# Patient Record
Sex: MALE | Race: WHITE | Employment: OTHER | URBAN - METROPOLITAN AREA
[De-identification: names, ages, dates, MRNs, and addresses within clinical notes are randomized per-mention and may not be internally consistent; named-entity substitution may affect disease eponyms.]

---

## 2018-04-25 PROBLEM — F42.8: Status: ACTIVE | Noted: 2018-04-25

## 2018-04-25 PROBLEM — F32.A CHRONIC DEPRESSION: Status: ACTIVE | Noted: 2018-04-25

## 2018-10-23 PROBLEM — I10 ESSENTIAL HYPERTENSION: Status: ACTIVE | Noted: 2018-10-23

## 2018-11-06 ENCOUNTER — HOSPITAL ENCOUNTER (OUTPATIENT)
Age: 83
Discharge: HOME | End: 2018-11-08
Payer: MEDICARE

## 2018-11-06 ENCOUNTER — NURSE ONLY (OUTPATIENT)
Dept: FAMILY MEDICINE CLINIC | Age: 83
End: 2018-11-06

## 2018-11-06 DIAGNOSIS — K52.9 COLITIS: ICD-10-CM

## 2018-11-06 DIAGNOSIS — I10 ESSENTIAL HYPERTENSION: ICD-10-CM

## 2018-11-06 DIAGNOSIS — I25.10 CORONARY ARTERY DISEASE INVOLVING NATIVE HEART WITHOUT ANGINA PECTORIS, UNSPECIFIED VESSEL OR LESION TYPE: ICD-10-CM

## 2018-11-06 LAB
ANION GAP SERPL CALCULATED.3IONS-SCNC: 15 MMOL/L (ref 7–16)
BUN BLDV-MCNC: 15 MG/DL (ref 8–23)
CALCIUM SERPL-MCNC: 9.1 MG/DL (ref 8.6–10.2)
CHLORIDE BLD-SCNC: 106 MMOL/L (ref 98–107)
CHOLESTEROL, TOTAL: 135 MG/DL (ref 0–199)
CO2: 22 MMOL/L (ref 22–29)
CREAT SERPL-MCNC: 1.1 MG/DL (ref 0.7–1.2)
GFR AFRICAN AMERICAN: >60
GFR NON-AFRICAN AMERICAN: >60 ML/MIN/1.73
GLUCOSE BLD-MCNC: 91 MG/DL (ref 74–99)
HCT VFR BLD CALC: 39.9 % (ref 37–54)
HDLC SERPL-MCNC: 43 MG/DL
HEMOGLOBIN: 12.5 G/DL (ref 12.5–16.5)
LDL CHOLESTEROL CALCULATED: 80 MG/DL (ref 0–99)
MCH RBC QN AUTO: 27.8 PG (ref 26–35)
MCHC RBC AUTO-ENTMCNC: 31.3 % (ref 32–34.5)
MCV RBC AUTO: 88.9 FL (ref 80–99.9)
PDW BLD-RTO: 16.5 FL (ref 11.5–15)
PLATELET # BLD: 233 E9/L (ref 130–450)
PMV BLD AUTO: 11.3 FL (ref 7–12)
POTASSIUM SERPL-SCNC: 4.3 MMOL/L (ref 3.5–5)
RBC # BLD: 4.49 E12/L (ref 3.8–5.8)
SODIUM BLD-SCNC: 143 MMOL/L (ref 132–146)
TRIGL SERPL-MCNC: 60 MG/DL (ref 0–149)
VLDLC SERPL CALC-MCNC: 12 MG/DL
WBC # BLD: 8.9 E9/L (ref 4.5–11.5)

## 2018-11-06 PROCEDURE — 80048 BASIC METABOLIC PNL TOTAL CA: CPT

## 2018-11-06 PROCEDURE — 80061 LIPID PANEL: CPT

## 2018-11-06 PROCEDURE — 85027 COMPLETE CBC AUTOMATED: CPT

## 2019-05-16 ENCOUNTER — APPOINTMENT (OUTPATIENT)
Dept: CT IMAGING | Age: 84
End: 2019-05-16
Payer: MEDICARE

## 2019-05-16 ENCOUNTER — HOSPITAL ENCOUNTER (EMERGENCY)
Age: 84
Discharge: HOME | End: 2019-05-16
Attending: EMERGENCY MEDICINE
Payer: MEDICARE

## 2019-05-16 ENCOUNTER — APPOINTMENT (OUTPATIENT)
Dept: GENERAL RADIOLOGY | Age: 84
End: 2019-05-16
Payer: MEDICARE

## 2019-05-16 VITALS
HEART RATE: 84 BPM | BODY MASS INDEX: 28.88 KG/M2 | SYSTOLIC BLOOD PRESSURE: 112 MMHG | DIASTOLIC BLOOD PRESSURE: 76 MMHG | RESPIRATION RATE: 16 BRPM | TEMPERATURE: 99 F | OXYGEN SATURATION: 96 % | HEIGHT: 67 IN | WEIGHT: 184 LBS

## 2019-05-16 DIAGNOSIS — R07.81 RIB PAIN ON LEFT SIDE: ICD-10-CM

## 2019-05-16 DIAGNOSIS — R07.89 CHEST WALL PAIN: ICD-10-CM

## 2019-05-16 DIAGNOSIS — R10.32 LEFT LOWER QUADRANT PAIN: Primary | ICD-10-CM

## 2019-05-16 LAB
ALBUMIN SERPL-MCNC: 4.1 G/DL (ref 3.5–5.2)
ALP BLD-CCNC: 94 U/L (ref 40–129)
ALT SERPL-CCNC: 19 U/L (ref 0–40)
ANION GAP SERPL CALCULATED.3IONS-SCNC: 12 MMOL/L (ref 7–16)
APTT: 26.4 SEC (ref 24.5–35.1)
AST SERPL-CCNC: 22 U/L (ref 0–39)
BASOPHILS ABSOLUTE: 0.06 E9/L (ref 0–0.2)
BASOPHILS RELATIVE PERCENT: 0.6 % (ref 0–2)
BILIRUB SERPL-MCNC: 0.9 MG/DL (ref 0–1.2)
BILIRUBIN URINE: NEGATIVE
BLOOD, URINE: NEGATIVE
BUN BLDV-MCNC: 17 MG/DL (ref 8–23)
BURR CELLS: ABNORMAL
CALCIUM SERPL-MCNC: 9.5 MG/DL (ref 8.6–10.2)
CHLORIDE BLD-SCNC: 101 MMOL/L (ref 98–107)
CLARITY: CLEAR
CO2: 23 MMOL/L (ref 22–29)
COLOR: YELLOW
CREAT SERPL-MCNC: 1.2 MG/DL (ref 0.7–1.2)
EKG ATRIAL RATE: 81 BPM
EKG P AXIS: 45 DEGREES
EKG P-R INTERVAL: 198 MS
EKG Q-T INTERVAL: 376 MS
EKG QRS DURATION: 96 MS
EKG QTC CALCULATION (BAZETT): 436 MS
EKG R AXIS: -30 DEGREES
EKG T AXIS: 39 DEGREES
EKG VENTRICULAR RATE: 81 BPM
EOSINOPHILS ABSOLUTE: 0.24 E9/L (ref 0.05–0.5)
EOSINOPHILS RELATIVE PERCENT: 2.3 % (ref 0–6)
GFR AFRICAN AMERICAN: >60
GFR AFRICAN AMERICAN: >60
GFR NON-AFRICAN AMERICAN: 58 ML/MIN/1.73
GFR NON-AFRICAN AMERICAN: 58 ML/MIN/1.73
GLUCOSE BLD-MCNC: 90 MG/DL (ref 74–99)
GLUCOSE BLD-MCNC: 92 MG/DL (ref 74–99)
GLUCOSE URINE: NEGATIVE MG/DL
HCT VFR BLD CALC: 42.9 % (ref 37–54)
HEMOGLOBIN: 14.3 G/DL (ref 12.5–16.5)
IMMATURE GRANULOCYTES #: 0.03 E9/L
IMMATURE GRANULOCYTES %: 0.3 % (ref 0–5)
INR BLD: 1.1
KETONES, URINE: NEGATIVE MG/DL
LEUKOCYTE ESTERASE, URINE: NEGATIVE
LYMPHOCYTES ABSOLUTE: 1.85 E9/L (ref 1.5–4)
LYMPHOCYTES RELATIVE PERCENT: 17.7 % (ref 20–42)
MCH RBC QN AUTO: 30.2 PG (ref 26–35)
MCHC RBC AUTO-ENTMCNC: 33.3 % (ref 32–34.5)
MCV RBC AUTO: 90.5 FL (ref 80–99.9)
MONOCYTES ABSOLUTE: 1.54 E9/L (ref 0.1–0.95)
MONOCYTES RELATIVE PERCENT: 14.8 % (ref 2–12)
NEUTROPHILS ABSOLUTE: 6.71 E9/L (ref 1.8–7.3)
NEUTROPHILS RELATIVE PERCENT: 64.3 % (ref 43–80)
NITRITE, URINE: NEGATIVE
OVALOCYTES: ABNORMAL
PDW BLD-RTO: 13.8 FL (ref 11.5–15)
PERFORMED ON: ABNORMAL
PH UA: 5.5 (ref 5–9)
PLATELET # BLD: 221 E9/L (ref 130–450)
PMV BLD AUTO: 10.6 FL (ref 7–12)
POC CHLORIDE: 111 MMOL/L (ref 100–108)
POC CREATININE: 1.2 MG/DL (ref 0.7–1.2)
POC POTASSIUM: 3.7 MMOL/L (ref 3.5–5)
POC SODIUM: 139 MMOL/L (ref 132–146)
POIKILOCYTES: ABNORMAL
POTASSIUM SERPL-SCNC: 4 MMOL/L (ref 3.5–5)
PROTEIN UA: NEGATIVE MG/DL
PROTHROMBIN TIME: 13.1 SEC (ref 9.3–12.4)
RBC # BLD: 4.74 E12/L (ref 3.8–5.8)
SODIUM BLD-SCNC: 136 MMOL/L (ref 132–146)
SPECIFIC GRAVITY UA: <=1.005 (ref 1–1.03)
TOTAL PROTEIN: 7.6 G/DL (ref 6.4–8.3)
TROPONIN: <0.01 NG/ML (ref 0–0.03)
UROBILINOGEN, URINE: 0.2 E.U./DL
WBC # BLD: 10.9 E9/L (ref 4.5–11.5)

## 2019-05-16 PROCEDURE — 85025 COMPLETE CBC W/AUTO DIFF WBC: CPT

## 2019-05-16 PROCEDURE — 82947 ASSAY GLUCOSE BLOOD QUANT: CPT

## 2019-05-16 PROCEDURE — 84295 ASSAY OF SERUM SODIUM: CPT

## 2019-05-16 PROCEDURE — 36415 COLL VENOUS BLD VENIPUNCTURE: CPT

## 2019-05-16 PROCEDURE — 84484 ASSAY OF TROPONIN QUANT: CPT

## 2019-05-16 PROCEDURE — 99284 EMERGENCY DEPT VISIT MOD MDM: CPT

## 2019-05-16 PROCEDURE — 2580000003 HC RX 258: Performed by: RADIOLOGY

## 2019-05-16 PROCEDURE — 93010 ELECTROCARDIOGRAM REPORT: CPT | Performed by: INTERNAL MEDICINE

## 2019-05-16 PROCEDURE — 80053 COMPREHEN METABOLIC PANEL: CPT

## 2019-05-16 PROCEDURE — 93005 ELECTROCARDIOGRAM TRACING: CPT | Performed by: NURSE PRACTITIONER

## 2019-05-16 PROCEDURE — 81003 URINALYSIS AUTO W/O SCOPE: CPT

## 2019-05-16 PROCEDURE — 74177 CT ABD & PELVIS W/CONTRAST: CPT

## 2019-05-16 PROCEDURE — 71046 X-RAY EXAM CHEST 2 VIEWS: CPT

## 2019-05-16 PROCEDURE — 82435 ASSAY OF BLOOD CHLORIDE: CPT

## 2019-05-16 PROCEDURE — 85730 THROMBOPLASTIN TIME PARTIAL: CPT

## 2019-05-16 PROCEDURE — 82565 ASSAY OF CREATININE: CPT

## 2019-05-16 PROCEDURE — 85610 PROTHROMBIN TIME: CPT

## 2019-05-16 PROCEDURE — 84132 ASSAY OF SERUM POTASSIUM: CPT

## 2019-05-16 PROCEDURE — 6360000004 HC RX CONTRAST MEDICATION: Performed by: RADIOLOGY

## 2019-05-16 RX ORDER — IBUPROFEN 600 MG/1
600 TABLET ORAL 4 TIMES DAILY PRN
Qty: 30 TABLET | Refills: 0 | Status: SHIPPED | OUTPATIENT
Start: 2019-05-16 | End: 2019-06-18 | Stop reason: ALTCHOICE

## 2019-05-16 RX ORDER — SODIUM CHLORIDE 0.9 % (FLUSH) 0.9 %
10 SYRINGE (ML) INJECTION PRN
Status: DISCONTINUED | OUTPATIENT
Start: 2019-05-16 | End: 2019-05-16 | Stop reason: HOSPADM

## 2019-05-16 RX ADMIN — IOPAMIDOL 110 ML: 755 INJECTION, SOLUTION INTRAVENOUS at 16:33

## 2019-05-16 RX ADMIN — Medication 10 ML: at 16:33

## 2019-05-16 ASSESSMENT — PAIN DESCRIPTION - LOCATION: LOCATION: BACK

## 2019-05-16 ASSESSMENT — ENCOUNTER SYMPTOMS: ABDOMINAL PAIN: 0

## 2019-05-16 ASSESSMENT — PAIN SCALES - GENERAL: PAINLEVEL_OUTOF10: 9

## 2019-05-16 NOTE — ED PROVIDER NOTES
Patient is a 80year old male presenting to the ED with chief complaint of left rib pain over the last few days. Pt states he went to see his chiropractor today for the pain. Per pt, chiropractor pushed on his left flank region causing significant pain. Advised to come to the ED for further evaluation and management. Pt has no abdominal pain at this time. Pt denies any fall, injury, headache, chest pain, sob, nausea, emesis, diarrhea, urinary sx, or any further complaints at this time. The history is provided by the patient. No  was used. Chest Pain   Chest pain location: Left lower rib cage. Pain severity:  Moderate  Context: breathing and movement    Context: not trauma    Associated symptoms: no abdominal pain        Review of Systems   Cardiovascular: Negative for chest pain. Gastrointestinal: Negative for abdominal pain. Musculoskeletal:        Left rib pain   All other systems reviewed and are negative. Physical Exam   Constitutional: He is oriented to person, place, and time. He appears well-developed and well-nourished. He appears distressed (mild). HENT:   Head: Normocephalic and atraumatic. Eyes: Pupils are equal, round, and reactive to light. Conjunctivae and EOM are normal.   Neck: Normal range of motion. Neck supple. Cardiovascular: Normal rate and regular rhythm. Pulmonary/Chest: Effort normal and breath sounds normal.   Abdominal: Soft. There is no tenderness. Musculoskeletal: Normal range of motion. Moderate left lower rib cage tenderness   Neurological: He is alert and oriented to person, place, and time. Skin: Skin is warm and dry. Psychiatric: He has a normal mood and affect. Nursing note and vitals reviewed.       Procedures    MDM  Number of Diagnoses or Management Options  Chest wall pain: new and requires workup  Left lower quadrant pain: new and requires workup  Rib pain on left side: new and requires workup  Diagnosis management comments: Differential diagnoses include chest wall pain, re-pain, rib fracture, rib strain, aneurysm, pneumothorax, hemothorax, appendicitis, pancreatitis, diverticulitis. Amount and/or Complexity of Data Reviewed  Clinical lab tests: reviewed and ordered  Tests in the radiology section of CPT®: ordered and reviewed  Tests in the medicine section of CPT®: ordered and reviewed  Independent visualization of images, tracings, or specimens: yes (EKG was done at 2:26 PM.  Normal sinus rhythm. Rate of 81. Positive LVH. No acute ST-T elevation. No old EKG available for comparison. Deviation. Incomplete right bundle branch block. No leakage available for comparison)    Risk of Complications, Morbidity, and/or Mortality  Presenting problems: high  Diagnostic procedures: high  Management options: high  General comments: Patient was stable throughout the course of treatment. Labs were unremarkable. EKG did not show any acute ST-T changes. CAT scan of abdomen and pelvis was negative for abdominal aortic aneurysm. It was positive for atherosclerosis of aorta. There is discussed with patient's. Will discharge patient home on pain medicine. He was advised to follow with her PCP for evaluation. He was also advised to return to ED if he is worsening in anyway. Patient understood and agreed with our management.           --------------------------------------------- PAST HISTORY ---------------------------------------------  Past Medical History:  has a past medical history of Acute pancreatitis, Anxiety, BPH (benign prostatic hyperplasia), CAD (coronary artery disease), GERD (gastroesophageal reflux disease), Hyperlipidemia, Obsessive compulsive disorder, and Psoriasis. Past Surgical History:  has a past surgical history that includes Colonoscopy (03/14/2017); Upper gastrointestinal endoscopy (03/14/2017); Throat surgery; and Cataract removal (Bilateral, 08/24/2017).     Social History:  reports that he quit smoking about 49 years ago. His smoking use included cigarettes. He has never used smokeless tobacco. He reports that he drinks alcohol. He reports that he does not use drugs. Family History: family history includes Asthma in his mother; Coronary Art Dis in his mother; Parkinsonism in his father. The patients home medications have been reviewed. Allergies: Patient has no known allergies.     -------------------------------------------------- RESULTS -------------------------------------------------  Labs:  Results for orders placed or performed during the hospital encounter of 05/16/19   Troponin   Result Value Ref Range    Troponin <0.01 0.00 - 0.03 ng/mL   CBC Auto Differential   Result Value Ref Range    WBC 10.9 4.5 - 11.5 E9/L    RBC 4.74 3.80 - 5.80 E12/L    Hemoglobin 14.3 12.5 - 16.5 g/dL    Hematocrit 42.9 37.0 - 54.0 %    MCV 90.5 80.0 - 99.9 fL    MCH 30.2 26.0 - 35.0 pg    MCHC 33.3 32.0 - 34.5 %    RDW 13.8 11.5 - 15.0 fL    Platelets 226 137 - 397 E9/L    MPV 10.6 7.0 - 12.0 fL    Neutrophils % 64.3 43.0 - 80.0 %    Immature Granulocytes % 0.3 0.0 - 5.0 %    Lymphocytes % 17.7 (L) 20.0 - 42.0 %    Monocytes % 14.8 (H) 2.0 - 12.0 %    Eosinophils % 2.3 0.0 - 6.0 %    Basophils % 0.6 0.0 - 2.0 %    Neutrophils # 6.71 1.80 - 7.30 E9/L    Immature Granulocytes # 0.03 E9/L    Lymphocytes # 1.85 1.50 - 4.00 E9/L    Monocytes # 1.54 (H) 0.10 - 0.95 E9/L    Eosinophils # 0.24 0.05 - 0.50 E9/L    Basophils # 0.06 0.00 - 0.20 E9/L    Poikilocytes 1+     [place] Cells 1+     Ovalocytes 1+    Comprehensive Metabolic Panel   Result Value Ref Range    Sodium 136 132 - 146 mmol/L    Potassium 4.0 3.5 - 5.0 mmol/L    Chloride 101 98 - 107 mmol/L    CO2 23 22 - 29 mmol/L    Anion Gap 12 7 - 16 mmol/L    Glucose 90 74 - 99 mg/dL    BUN 17 8 - 23 mg/dL    CREATININE 1.2 0.7 - 1.2 mg/dL    GFR Non-African American 58 >=60 mL/min/1.73    GFR African American >60     Calcium 9.5 8.6 - 10.2 mg/dL    Total Protein 7.6 6.4 - 8.3 g/dL    Alb 4.1 3.5 - 5.2 g/dL    Total Bilirubin 0.9 0.0 - 1.2 mg/dL    Alkaline Phosphatase 94 40 - 129 U/L    ALT 19 0 - 40 U/L    AST 22 0 - 39 U/L   Protime-INR   Result Value Ref Range    Protime 13.1 (H) 9.3 - 12.4 sec    INR 1.1    APTT   Result Value Ref Range    aPTT 26.4 24.5 - 35.1 sec   POCT Venous   Result Value Ref Range    POC Sodium 139 132 - 146 mmol/L    POC Potassium 3.7 3.5 - 5.0 mmol/L    POC Chloride 111 (H) 100 - 108 mmol/L    POC Glucose 92 74 - 99 mg/dl    POC Creatinine 1.2 0.7 - 1.2 mg/dL    GFR Non-African American 58 >=60 mL/min/1.73    GFR  >60     Performed on SEE BELOW    EKG 12 Lead   Result Value Ref Range    Ventricular Rate 81 BPM    Atrial Rate 81 BPM    P-R Interval 198 ms    QRS Duration 96 ms    Q-T Interval 376 ms    QTc Calculation (Bazett) 436 ms    P Axis 45 degrees    R Axis -30 degrees    T Axis 39 degrees       Radiology:  CT ABDOMEN PELVIS W IV CONTRAST Additional Contrast? None   Final Result      NO ACUTE PATHOLOGY SEEN IN ABDOMEN OR PELVIS       Moderate-sized hiatal hernia      Grade 2 anterolisthesis of L5-S1 with osteoarthritic changes and disc   disease. Severe atherosclerotic changes of the abdominal aorta iliac vessels   with calcified plaque. XR CHEST STANDARD (2 VW)   Final Result   NO ACUTE CARDIOPULMONARY PROCESS              ------------------------- NURSING NOTES AND VITALS REVIEWED ---------------------------  Date / Time Roomed:  5/16/2019  2:38 PM  ED Bed Assignment:  40/40    The nursing notes within the ED encounter and vital signs as below have been reviewed.    /81   Pulse 91   Temp 99 °F (37.2 °C)   Resp 16   Ht 5' 7\" (1.702 m)   Wt 184 lb (83.5 kg)   SpO2 96%   BMI 28.82 kg/m²   Oxygen Saturation Interpretation: Normal      ------------------------------------------ PROGRESS NOTES ------------------------------------------  I have spoken with the patient and discussed todays results, in addition to providing specific details for the plan of care and counseling regarding the diagnosis and prognosis. Their questions are answered at this time and they are agreeable with the plan. I discussed at length with them reasons for immediate return here for re evaluation. They will followup with primary care by calling their office tomorrow. --------------------------------- ADDITIONAL PROVIDER NOTES ---------------------------------  At this time the patient is without objective evidence of an acute process requiring hospitalization or inpatient management. They have remained hemodynamically stable throughout their entire ED visit and are stable for discharge with outpatient follow-up. The plan has been discussed in detail and they are aware of the specific conditions for emergent return, as well as the importance of follow-up. New Prescriptions    IBUPROFEN (ADVIL;MOTRIN) 600 MG TABLET    Take 1 tablet by mouth 4 times daily as needed for Pain       Diagnosis:  1. Left lower quadrant pain    2. Chest wall pain    3. Rib pain on left side        Disposition:  Patient's disposition: Discharge to home  Patient's condition is stable. Labs      Radiology      EKG Interpretation. EKG was done at 2:26 PM.  Normal sinus rhythm. Rate of 81. Positive LVH. No acute ST-T elevation. No old EKG available for comparison. Deviation. Incomplete right bundle branch block. No leakage available for comparison    5/16/19, 3:21 PM.    This note is prepared by Bobby Charles, acting as Scribe for Miko Hilton DO. Miko Hilton DO:  The scribe's documentation has been prepared under my direction and personally reviewed by me in its entirety. I confirm that the note above accurately reflects all work, treatment, procedures, and medical decision making performed by me.         Miko Hilton DO  05/16/19 1611

## 2021-02-02 PROBLEM — K58.0 IRRITABLE BOWEL SYNDROME WITH DIARRHEA: Status: ACTIVE | Noted: 2021-02-02

## 2021-02-02 PROBLEM — F43.21 GRIEF REACTION: Status: ACTIVE | Noted: 2021-02-02

## 2021-02-02 PROBLEM — K21.9 HIATAL HERNIA WITH GERD: Status: ACTIVE | Noted: 2021-02-02

## 2021-02-02 PROBLEM — F43.20 GRIEF REACTION: Status: ACTIVE | Noted: 2021-02-02

## 2021-02-02 PROBLEM — K44.9 HIATAL HERNIA WITH GERD: Status: ACTIVE | Noted: 2021-02-02

## 2021-03-12 ENCOUNTER — APPOINTMENT (OUTPATIENT)
Dept: CT IMAGING | Age: 86
End: 2021-03-12
Payer: MEDICARE

## 2021-03-12 ENCOUNTER — APPOINTMENT (OUTPATIENT)
Dept: ULTRASOUND IMAGING | Age: 86
End: 2021-03-12
Payer: MEDICARE

## 2021-03-12 ENCOUNTER — HOSPITAL ENCOUNTER (EMERGENCY)
Age: 86
Discharge: HOME | End: 2021-03-12
Attending: EMERGENCY MEDICINE
Payer: MEDICARE

## 2021-03-12 ENCOUNTER — APPOINTMENT (OUTPATIENT)
Dept: GENERAL RADIOLOGY | Age: 86
End: 2021-03-12
Payer: MEDICARE

## 2021-03-12 VITALS
HEART RATE: 83 BPM | HEIGHT: 68 IN | BODY MASS INDEX: 21.67 KG/M2 | DIASTOLIC BLOOD PRESSURE: 82 MMHG | SYSTOLIC BLOOD PRESSURE: 131 MMHG | RESPIRATION RATE: 17 BRPM | TEMPERATURE: 97.5 F | WEIGHT: 143 LBS | OXYGEN SATURATION: 97 %

## 2021-03-12 DIAGNOSIS — J44.1 COPD EXACERBATION (HCC): ICD-10-CM

## 2021-03-12 DIAGNOSIS — R06.00 DYSPNEA, UNSPECIFIED TYPE: Primary | ICD-10-CM

## 2021-03-12 DIAGNOSIS — K44.9 HIATAL HERNIA: ICD-10-CM

## 2021-03-12 LAB
ALBUMIN SERPL-MCNC: 3.7 G/DL (ref 3.5–5.2)
ALP BLD-CCNC: 77 U/L (ref 40–129)
ALT SERPL-CCNC: 15 U/L (ref 0–40)
ANION GAP SERPL CALCULATED.3IONS-SCNC: 11 MMOL/L (ref 7–16)
AST SERPL-CCNC: 23 U/L (ref 0–39)
BILIRUB SERPL-MCNC: 0.7 MG/DL (ref 0–1.2)
BUN BLDV-MCNC: 13 MG/DL (ref 8–23)
CALCIUM SERPL-MCNC: 9.3 MG/DL (ref 8.6–10.2)
CHLORIDE BLD-SCNC: 103 MMOL/L (ref 98–107)
CO2: 25 MMOL/L (ref 22–29)
CREAT SERPL-MCNC: 1 MG/DL (ref 0.7–1.2)
GFR AFRICAN AMERICAN: >60
GFR NON-AFRICAN AMERICAN: >60 ML/MIN/1.73
GLUCOSE BLD-MCNC: 91 MG/DL (ref 74–99)
HCT VFR BLD CALC: 40.8 % (ref 37–54)
HEMOGLOBIN: 13.2 G/DL (ref 12.5–16.5)
MCH RBC QN AUTO: 29.6 PG (ref 26–35)
MCHC RBC AUTO-ENTMCNC: 32.4 % (ref 32–34.5)
MCV RBC AUTO: 91.5 FL (ref 80–99.9)
PDW BLD-RTO: 14.6 FL (ref 11.5–15)
PLATELET # BLD: 211 E9/L (ref 130–450)
PMV BLD AUTO: 10.4 FL (ref 7–12)
POTASSIUM SERPL-SCNC: 4.3 MMOL/L (ref 3.5–5)
PRO-BNP: 279 PG/ML (ref 0–450)
RBC # BLD: 4.46 E12/L (ref 3.8–5.8)
SARS-COV-2, NAAT: NOT DETECTED
SODIUM BLD-SCNC: 139 MMOL/L (ref 132–146)
TOTAL PROTEIN: 7.1 G/DL (ref 6.4–8.3)
TROPONIN: <0.01 NG/ML (ref 0–0.03)
WBC # BLD: 9.5 E9/L (ref 4.5–11.5)

## 2021-03-12 PROCEDURE — 99284 EMERGENCY DEPT VISIT MOD MDM: CPT

## 2021-03-12 PROCEDURE — 80053 COMPREHEN METABOLIC PANEL: CPT

## 2021-03-12 PROCEDURE — 87635 SARS-COV-2 COVID-19 AMP PRB: CPT

## 2021-03-12 PROCEDURE — 85027 COMPLETE CBC AUTOMATED: CPT

## 2021-03-12 PROCEDURE — 71275 CT ANGIOGRAPHY CHEST: CPT

## 2021-03-12 PROCEDURE — 6360000004 HC RX CONTRAST MEDICATION: Performed by: RADIOLOGY

## 2021-03-12 PROCEDURE — 84484 ASSAY OF TROPONIN QUANT: CPT

## 2021-03-12 PROCEDURE — 93971 EXTREMITY STUDY: CPT | Performed by: RADIOLOGY

## 2021-03-12 PROCEDURE — 93005 ELECTROCARDIOGRAM TRACING: CPT | Performed by: NURSE PRACTITIONER

## 2021-03-12 PROCEDURE — 83880 ASSAY OF NATRIURETIC PEPTIDE: CPT

## 2021-03-12 PROCEDURE — 93971 EXTREMITY STUDY: CPT

## 2021-03-12 PROCEDURE — 71045 X-RAY EXAM CHEST 1 VIEW: CPT

## 2021-03-12 RX ADMIN — IOPAMIDOL 75 ML: 755 INJECTION, SOLUTION INTRAVENOUS at 21:13

## 2021-03-12 ASSESSMENT — ENCOUNTER SYMPTOMS
EYE PAIN: 0
WHEEZING: 0
RHINORRHEA: 0
ABDOMINAL PAIN: 0
NAUSEA: 0
COUGH: 0
DIARRHEA: 0
SHORTNESS OF BREATH: 1
VOMITING: 0

## 2021-03-12 ASSESSMENT — PAIN DESCRIPTION - PAIN TYPE: TYPE: ACUTE PAIN

## 2021-03-12 ASSESSMENT — PAIN DESCRIPTION - LOCATION: LOCATION: FOOT

## 2021-03-12 NOTE — ED PROVIDER NOTES
Pt Name: Elizabet Ying  MRN: [id number]  [place] 1935  Date of evaluation: 3/12/2021      CHIEF COMPLAINT       Chief Complaint   Patient presents with    Shortness of Breath     ongoing for ten days. Per pt feels SOB when standing up to do dishes and with exertion.  Foot Swelling        Elizabet Ying is a 80 y.o. male presenting to the ED with chief complaint of shortness of breath, foot swelling. Patient swelling in his right foot is been ongoing for the past week. He also has some short of breath that is worse when he exerts himself. He is also more short of breath when he stands up and does minor work around the house like doing dishes. He denies a history of blood clots. He has not any blood thinning medications. He denies any chest pain on examination. His complaints are mild in severity. HPI       Patient has a medical history as listed below:   Past Medical History:   Diagnosis Date    Acute pancreatitis     6/06 AND 11/09    Anxiety     BPH (benign prostatic hyperplasia)     CAD (coronary artery disease)     GERD (gastroesophageal reflux disease)     Hyperlipidemia     Obsessive compulsive disorder     Psoriasis      Patient Active Problem List    Diagnosis Date Noted    Irritable bowel syndrome with diarrhea 02/02/2021    Hiatal hernia with GERD 02/02/2021    Grief reaction 02/02/2021    Essential hypertension 10/23/2018    Chronic depression 04/25/2018    Obsessive neurosis 04/25/2018    Psoriasis     BPH (benign prostatic hyperplasia)     CAD (coronary artery disease)     Hyperlipidemia        Review of Systems   Constitutional: Negative for chills and fever. HENT: Negative for congestion and rhinorrhea. Eyes: Negative for pain and visual disturbance. Respiratory: Positive for shortness of breath. Negative for cough and wheezing. Cardiovascular: Positive for leg swelling. Negative for chest pain and palpitations.    Gastrointestinal: Negative for abdominal pain, diarrhea, nausea and vomiting. Genitourinary: Negative for dysuria, frequency and hematuria. Musculoskeletal: Negative for arthralgias and neck pain. Neurological: Negative for numbness and headaches. Physical Exam  Vitals signs and nursing note reviewed. Constitutional:       General: He is not in acute distress. Appearance: He is well-developed. HENT:      Head: Normocephalic and atraumatic. Nose: Nose normal.      Mouth/Throat:      Pharynx: Oropharynx is clear. Eyes:      Conjunctiva/sclera: Conjunctivae normal.      Pupils: Pupils are equal, round, and reactive to light. Neck:      Musculoskeletal: Normal range of motion. No neck rigidity. Cardiovascular:      Rate and Rhythm: Normal rate and regular rhythm. Heart sounds: Normal heart sounds. Pulmonary:      Effort: Pulmonary effort is normal. No respiratory distress. Breath sounds: Examination of the right-lower field reveals rales. Examination of the left-lower field reveals rales. Rales present. No wheezing or rhonchi. Chest:      Chest wall: No tenderness. Abdominal:      General: Abdomen is flat. There is no distension. Palpations: Abdomen is soft. There is no mass. Tenderness: There is no abdominal tenderness. Musculoskeletal:      Right lower leg: Edema (+1 pitting edema right lower extremity. No pain with palpation of calf or dorsiflexion of right foot) present. Left lower leg: No edema. Skin:     General: Skin is warm and dry. Findings: No rash. Neurological:      Mental Status: He is alert. Mental status is at baseline. Procedures     MDM     ED Course as of Mar 14 2203   Fri Mar 12, 2021   1910 Patient was reevaluated at bedside. He was updated of his results. [WL]   2051 Patient signed out to me. 49-year-old male here for shortness of breath and chest pain. Awaiting Covid test as well as CTA. Will reevaluate patient at bedside.     [JV]   2105 Covid negative. COVID-19, Rapid:    SARS-CoV-2, NAAT Not Detected [JV]      ED Course User Index  [JV] Jean Marie Palacios MD  [WL] Estrellita Garcia DO        Patient presents to the ED for evaluation. This patient was seen and evaluated with the attending. Work-up was performed with concerns for but not limited to ACS, pneumonia, PE and Viral illness  With swelling is unilateral limb and short of breath we are concerned for PE so CTA is planned to be performed in the lungs. His chest x-ray showed concern of effusion versus pneumonia. Patient continues to be non-toxic on re-evaluation. Findings were discussed with the patient and reasons to immediately return to the ED were articulated to them. They will follow-up with their PCP. EKG read by me. Sinus rhythm with first-degree AV block and occasional PVCs. Incomplete right bundle branch block. No STEMI.    --------------------------------------------- PAST HISTORY ---------------------------------------------  Past Medical History:  has a past medical history of Acute pancreatitis, Anxiety, BPH (benign prostatic hyperplasia), CAD (coronary artery disease), GERD (gastroesophageal reflux disease), Hyperlipidemia, Obsessive compulsive disorder, and Psoriasis. Past Surgical History:  has a past surgical history that includes Colonoscopy (03/14/2017); Upper gastrointestinal endoscopy (03/14/2017); Throat surgery; and Cataract removal (Bilateral, 08/24/2017). Social History:  reports that he quit smoking about 51 years ago. His smoking use included cigarettes. He has never used smokeless tobacco. He reports current alcohol use. He reports that he does not use drugs. Family History: family history includes Asthma in his mother; Coronary Art Dis in his mother; Parkinsonism in his father. The patients home medications have been reviewed. Allergies: Patient has no known allergies.     -------------------------------------------------- RESULTS -------------------------------------------------  Labs:  Results for orders placed or performed during the hospital encounter of 03/12/21   COVID-19, Rapid    Specimen: Nasopharyngeal Swab   Result Value Ref Range    SARS-CoV-2, NAAT Not Detected Not Detected   CBC   Result Value Ref Range    WBC 9.5 4.5 - 11.5 E9/L    RBC 4.46 3.80 - 5.80 E12/L    Hemoglobin 13.2 12.5 - 16.5 g/dL    Hematocrit 40.8 37.0 - 54.0 %    MCV 91.5 80.0 - 99.9 fL    MCH 29.6 26.0 - 35.0 pg    MCHC 32.4 32.0 - 34.5 %    RDW 14.6 11.5 - 15.0 fL    Platelets 065 374 - 774 E9/L    MPV 10.4 7.0 - 12.0 fL   Comprehensive Metabolic Panel   Result Value Ref Range    Sodium 139 132 - 146 mmol/L    Potassium 4.3 3.5 - 5.0 mmol/L    Chloride 103 98 - 107 mmol/L    CO2 25 22 - 29 mmol/L    Anion Gap 11 7 - 16 mmol/L    Glucose 91 74 - 99 mg/dL    BUN 13 8 - 23 mg/dL    CREATININE 1.0 0.7 - 1.2 mg/dL    GFR Non-African American >60 >=60 mL/min/1.73    GFR African American >60     Calcium 9.3 8.6 - 10.2 mg/dL    Total Protein 7.1 6.4 - 8.3 g/dL    Albumin 3.7 3.5 - 5.2 g/dL    Total Bilirubin 0.7 0.0 - 1.2 mg/dL    Alkaline Phosphatase 77 40 - 129 U/L    ALT 15 0 - 40 U/L    AST 23 0 - 39 U/L   Brain Natriuretic Peptide   Result Value Ref Range    Pro- 0 - 450 pg/mL   Troponin   Result Value Ref Range    Troponin <0.01 0.00 - 0.03 ng/mL   EKG 12 Lead   Result Value Ref Range    Ventricular Rate 70 BPM    Atrial Rate 70 BPM    P-R Interval 220 ms    QRS Duration 100 ms    Q-T Interval 394 ms    QTc Calculation (Bazett) 425 ms    P Axis 60 degrees    R Axis 8 degrees    T Axis 46 degrees       Radiology:  CTA PULMONARY W CONTRAST   Final Result   No evidence of pulmonary embolism. COPD and chronic parenchymal changes in both lungs. Large hiatal hernia.          US DUP LOWER EXTREMITY RIGHT CLAUDIA   Final Result   Within the visualized vessels there is no evidence for deep venous   thrombosis               XR CHEST PORTABLE   Final Result Increased interstitial opacities bilaterally suggestive of pneumonia or   interstitial pulmonary edema. ------------------------- NURSING NOTES AND VITALS REVIEWED ---------------------------  Date / Time Roomed:  3/12/2021  2:42 PM  ED Bed Assignment:  37/37    The nursing notes within the ED encounter and vital signs as below have been reviewed. /82   Pulse 83   Temp 97.5 °F (36.4 °C)   Resp 17   Ht 5' 8\" (1.727 m)   Wt 143 lb (64.9 kg)   SpO2 97%   BMI 21.74 kg/m²           --------------------------------- ADDITIONAL PROVIDER NOTES ---------------------------------  At this time the patient is without objective evidence of an acute process requiring hospitalization or inpatient management. They have remained hemodynamically stable throughout their entire ED visit and are stable for discharge with outpatient follow-up. The plan has been discussed in detail and they are aware of the specific conditions for emergent return, as well as the importance of follow-up. Discharge Medication List as of 3/12/2021 10:44 PM          Diagnosis:  1. Dyspnea, unspecified type    2. COPD exacerbation ([facility] [address]    3. Hiatal hernia        Disposition:  Patient's disposition: Discharge  Patient's condition is stable. NOTE:  This report was transcribed using voice recognition software. Efforts were made to ensure accuracy; however, inadvertent computerized transcription errors may be present.          Ismael Glaser DO  Resident  03/14/21 0238

## 2021-03-13 LAB
EKG ATRIAL RATE: 70 BPM
EKG P AXIS: 60 DEGREES
EKG P-R INTERVAL: 220 MS
EKG Q-T INTERVAL: 394 MS
EKG QRS DURATION: 100 MS
EKG QTC CALCULATION (BAZETT): 425 MS
EKG R AXIS: 8 DEGREES
EKG T AXIS: 46 DEGREES
EKG VENTRICULAR RATE: 70 BPM

## 2021-03-13 PROCEDURE — 93010 ELECTROCARDIOGRAM REPORT: CPT | Performed by: INTERNAL MEDICINE

## 2021-03-13 NOTE — ED NOTES
SpO2 96% with ambulation on room air. No issues with ambulation. Dr notified.      Maisha Slaughter RN  03/12/21 1614

## 2021-11-23 ENCOUNTER — OFFICE VISIT (OUTPATIENT)
Dept: FAMILY MEDICINE CLINIC | Age: 86
End: 2021-11-23
Payer: MEDICARE

## 2021-11-23 VITALS
OXYGEN SATURATION: 98 % | RESPIRATION RATE: 18 BRPM | SYSTOLIC BLOOD PRESSURE: 126 MMHG | TEMPERATURE: 98.7 F | HEART RATE: 85 BPM | HEIGHT: 68 IN | BODY MASS INDEX: 27.71 KG/M2 | WEIGHT: 182.8 LBS | DIASTOLIC BLOOD PRESSURE: 72 MMHG

## 2021-11-23 DIAGNOSIS — Z20.822 EXPOSURE TO COVID-19 VIRUS: Primary | ICD-10-CM

## 2021-11-23 LAB
Lab: NORMAL
PERFORMING INSTRUMENT: NORMAL
QC PASS/FAIL: NORMAL
SARS-COV-2, POC: NORMAL

## 2021-11-23 PROCEDURE — G8482 FLU IMMUNIZE ORDER/ADMIN: HCPCS | Performed by: INTERNAL MEDICINE

## 2021-11-23 PROCEDURE — 99213 OFFICE O/P EST LOW 20 MIN: CPT | Performed by: INTERNAL MEDICINE

## 2021-11-23 PROCEDURE — G8427 DOCREV CUR MEDS BY ELIG CLIN: HCPCS | Performed by: INTERNAL MEDICINE

## 2021-11-23 PROCEDURE — 1036F TOBACCO NON-USER: CPT | Performed by: INTERNAL MEDICINE

## 2021-11-23 PROCEDURE — G8417 CALC BMI ABV UP PARAM F/U: HCPCS | Performed by: INTERNAL MEDICINE

## 2021-11-23 PROCEDURE — 1123F ACP DISCUSS/DSCN MKR DOCD: CPT | Performed by: INTERNAL MEDICINE

## 2021-11-23 PROCEDURE — 87426 SARSCOV CORONAVIRUS AG IA: CPT | Performed by: INTERNAL MEDICINE

## 2021-11-23 PROCEDURE — 4040F PNEUMOC VAC/ADMIN/RCVD: CPT | Performed by: INTERNAL MEDICINE

## 2021-11-23 NOTE — PROGRESS NOTES
Chief Complaint:   Concern For COVID-19 (Covid exposure)      History of Present Illness   Source of history provided by:  patient. Froy Coely is a 80 y.o. old male with a past medical history of:   Past Medical History:   Diagnosis Date    Acute pancreatitis     6/06 AND 11/09    Anxiety     BPH (benign prostatic hyperplasia)     CAD (coronary artery disease)     GERD (gastroesophageal reflux disease)     Hyperlipidemia     Obsessive compulsive disorder     Psoriasis     Presents to the walk in clinic for evaluation after COVID exposure. Denies any fever, chills, wheezing, CP, SOB, or GI symptoms. Denies any hx of asthma, COPD, or tobacco use. Denies any history of international travel in the past 14 days. Reports exposure to 2 individuals with known COVID-19 infection last weekend. ROS    Unless otherwise stated in this report or unable to obtain because of the patient's clinical or mental status as evidenced by the medical record, this patients's positive and negative responses for Review of Systems, constitutional, psych, eyes, ENT, cardiovascular, respiratory, gastrointestinal, neurological, genitourinary, musculoskeletal, integument systems and systems related to the presenting problem are either stated in the preceding or were not pertinent or were negative for the symptoms and/or complaints related to the medical problem. Past Surgical History:  has a past surgical history that includes Colonoscopy (03/14/2017); Upper gastrointestinal endoscopy (03/14/2017); Throat surgery; and Cataract removal (Bilateral, 08/24/2017). Social History:  reports that he quit smoking about 52 years ago. His smoking use included cigarettes. He has never used smokeless tobacco. He reports current alcohol use. He reports that he does not use drugs. Family History: family history includes Asthma in his mother; Coronary Art Dis in his mother; Parkinsonism in his father.    Allergies: Patient has no known allergies. Physical Exam         VS:  /72   Pulse 85   Temp 98.7 °F (37.1 °C) (Infrared)   Resp 18   Ht 5' 8\" (1.727 m)   Wt 182 lb 12.8 oz (82.9 kg)   SpO2 98%   BMI 27.79 kg/m²    Oxygen Saturation Interpretation: Normal.    Constitutional:  Alert, development consistent with age. Ears:  External Ears: Bilateral pinna normal. TMs without erythema or perforation bilaterally. Canals normal bilaterally without swelling or exudate  Nose:  no congestion of the nasal mucosa. There is no injection to middle turbinates bilaterally. Throat: no posterior pharyngeal erythema with mild post nasal drip present. No exudate or tonsillar hypertrophy noted. Neck:  Supple. There is no anterior cervical adenopathy. Lungs: CTAB without wheezes, rales, or rhonchi  Heart:  Regular rate and rhythm, normal heart sounds, without pathological murmurs, ectopy, gallops, or rubs. Skin:  Normal turgor. Warm, dry, without visible rash. Neurological:  Alert and oriented. Motor functions intact. Responds to verbal commands. Lab / Imaging Results   (All laboratory and radiology results have been personally reviewed by myself)  Labs:  No results found for this visit on 11/23/21. Assessment / Plan     Impression(s):  Maggie Gross was seen today for concern for covid-19. Diagnoses and all orders for this visit:    Exposure to COVID-19 virus  -     POCT COVID-19, Antigen      Disposition:  Disposition: Discharge to home. COVID test was negative. Patient is asymptomatic. PCP or urgent care in 5-7 days if symptoms develop. ED sooner if symptoms worsen or change. Red flag symptoms discussed. Pt is in agreement with this care plan. All questions answered. Di Rothman DO    This visit was provided as a focused evaluation during the COVID -19 pandemic/national emergency. A comprehensive review of all previous patient history and testing was not conducted.   Pertinent findings were elicited during the visit.

## 2022-03-25 PROBLEM — F32.A CHRONIC DEPRESSION: Status: RESOLVED | Noted: 2018-04-25 | Resolved: 2022-03-25

## 2022-03-25 PROBLEM — F43.21 GRIEF REACTION: Status: RESOLVED | Noted: 2021-02-02 | Resolved: 2022-03-25

## 2022-03-25 PROBLEM — F43.20 GRIEF REACTION: Status: RESOLVED | Noted: 2021-02-02 | Resolved: 2022-03-25

## 2022-04-02 ENCOUNTER — APPOINTMENT (OUTPATIENT)
Dept: CT IMAGING | Age: 87
DRG: 300 | End: 2022-04-02
Payer: MEDICARE

## 2022-04-02 ENCOUNTER — APPOINTMENT (OUTPATIENT)
Dept: GENERAL RADIOLOGY | Age: 87
DRG: 300 | End: 2022-04-02
Payer: MEDICARE

## 2022-04-02 ENCOUNTER — HOSPITAL ENCOUNTER (INPATIENT)
Age: 87
LOS: 6 days | Discharge: HOME HEALTH | DRG: 300 | End: 2022-04-08
Attending: STUDENT IN AN ORGANIZED HEALTH CARE EDUCATION/TRAINING PROGRAM | Admitting: INTERNAL MEDICINE
Payer: MEDICARE

## 2022-04-02 DIAGNOSIS — I71.012 DESCENDING THORACIC AORTIC DISSECTION: ICD-10-CM

## 2022-04-02 DIAGNOSIS — M25.511 RIGHT SHOULDER PAIN, UNSPECIFIED CHRONICITY: ICD-10-CM

## 2022-04-02 DIAGNOSIS — I71.03 DISSECTION OF THORACOABDOMINAL AORTA (HCC): Primary | ICD-10-CM

## 2022-04-02 DIAGNOSIS — K44.9 HIATAL HERNIA: ICD-10-CM

## 2022-04-02 PROBLEM — I71.00 AORTIC DISSECTION (HCC): Status: ACTIVE | Noted: 2022-04-02

## 2022-04-02 LAB
ABO/RH: NORMAL
ALBUMIN SERPL-MCNC: 3.9 G/DL (ref 3.5–5.2)
ALP BLD-CCNC: 86 U/L (ref 40–129)
ALT SERPL-CCNC: 15 U/L (ref 0–40)
ANION GAP SERPL CALCULATED.3IONS-SCNC: 13 MMOL/L (ref 7–16)
ANISOCYTOSIS: ABNORMAL
ANTIBODY SCREEN: NORMAL
APTT: 27.1 SEC (ref 24.5–35.1)
AST SERPL-CCNC: 26 U/L (ref 0–39)
ATYPICAL LYMPHOCYTE RELATIVE PERCENT: 13.2 % (ref 0–4)
BASOPHILS ABSOLUTE: 0.23 E9/L (ref 0–0.2)
BASOPHILS RELATIVE PERCENT: 1.7 % (ref 0–2)
BILIRUB SERPL-MCNC: 0.9 MG/DL (ref 0–1.2)
BUN BLDV-MCNC: 10 MG/DL (ref 6–23)
BURR CELLS: ABNORMAL
CALCIUM SERPL-MCNC: 9 MG/DL (ref 8.6–10.2)
CHLORIDE BLD-SCNC: 103 MMOL/L (ref 98–107)
CO2: 22 MMOL/L (ref 22–29)
CREAT SERPL-MCNC: 0.9 MG/DL (ref 0.7–1.2)
EOSINOPHILS ABSOLUTE: 0.35 E9/L (ref 0.05–0.5)
EOSINOPHILS RELATIVE PERCENT: 2.6 % (ref 0–6)
GFR AFRICAN AMERICAN: >60
GFR NON-AFRICAN AMERICAN: >60 ML/MIN/1.73
GLUCOSE BLD-MCNC: 98 MG/DL (ref 74–99)
HCT VFR BLD CALC: 41.1 % (ref 37–54)
HEMOGLOBIN: 13.5 G/DL (ref 12.5–16.5)
INR BLD: 1.1
LACTIC ACID: 2 MMOL/L (ref 0.5–2.2)
LYMPHOCYTES ABSOLUTE: 4.19 E9/L (ref 1.5–4)
LYMPHOCYTES RELATIVE PERCENT: 17.5 % (ref 20–42)
MCH RBC QN AUTO: 30.3 PG (ref 26–35)
MCHC RBC AUTO-ENTMCNC: 32.8 % (ref 32–34.5)
MCV RBC AUTO: 92.4 FL (ref 80–99.9)
METAMYELOCYTES RELATIVE PERCENT: 7 % (ref 0–1)
MONOCYTES ABSOLUTE: 0.68 E9/L (ref 0.1–0.95)
MONOCYTES RELATIVE PERCENT: 5.3 % (ref 2–12)
MYELOCYTE PERCENT: 1.8 % (ref 0–0)
NEUTROPHILS ABSOLUTE: 8.1 E9/L (ref 1.8–7.3)
NEUTROPHILS RELATIVE PERCENT: 50.9 % (ref 43–80)
OVALOCYTES: ABNORMAL
PDW BLD-RTO: 14.8 FL (ref 11.5–15)
PLATELET # BLD: 161 E9/L (ref 130–450)
PMV BLD AUTO: 10.7 FL (ref 7–12)
POIKILOCYTES: ABNORMAL
POLYCHROMASIA: ABNORMAL
POTASSIUM SERPL-SCNC: 3.2 MMOL/L (ref 3.5–5)
PROTHROMBIN TIME: 12.3 SEC (ref 9.3–12.4)
RBC # BLD: 4.45 E12/L (ref 3.8–5.8)
SARS-COV-2, NAAT: NOT DETECTED
SODIUM BLD-SCNC: 138 MMOL/L (ref 132–146)
TOTAL PROTEIN: 7.7 G/DL (ref 6.4–8.3)
TROPONIN, HIGH SENSITIVITY: 21 NG/L (ref 0–11)
TROPONIN, HIGH SENSITIVITY: 24 NG/L (ref 0–11)
TROPONIN, HIGH SENSITIVITY: 29 NG/L (ref 0–11)
WBC # BLD: 13.5 E9/L (ref 4.5–11.5)

## 2022-04-02 PROCEDURE — 6360000002 HC RX W HCPCS: Performed by: STUDENT IN AN ORGANIZED HEALTH CARE EDUCATION/TRAINING PROGRAM

## 2022-04-02 PROCEDURE — 86901 BLOOD TYPING SEROLOGIC RH(D): CPT

## 2022-04-02 PROCEDURE — 85610 PROTHROMBIN TIME: CPT

## 2022-04-02 PROCEDURE — 80053 COMPREHEN METABOLIC PANEL: CPT

## 2022-04-02 PROCEDURE — 85730 THROMBOPLASTIN TIME PARTIAL: CPT

## 2022-04-02 PROCEDURE — 2500000003 HC RX 250 WO HCPCS: Performed by: STUDENT IN AN ORGANIZED HEALTH CARE EDUCATION/TRAINING PROGRAM

## 2022-04-02 PROCEDURE — 93005 ELECTROCARDIOGRAM TRACING: CPT | Performed by: NURSE PRACTITIONER

## 2022-04-02 PROCEDURE — 6370000000 HC RX 637 (ALT 250 FOR IP): Performed by: INTERNAL MEDICINE

## 2022-04-02 PROCEDURE — 6360000002 HC RX W HCPCS

## 2022-04-02 PROCEDURE — 2000000000 HC ICU R&B

## 2022-04-02 PROCEDURE — 86900 BLOOD TYPING SEROLOGIC ABO: CPT

## 2022-04-02 PROCEDURE — 71275 CT ANGIOGRAPHY CHEST: CPT

## 2022-04-02 PROCEDURE — 74174 CTA ABD&PLVS W/CONTRAST: CPT

## 2022-04-02 PROCEDURE — 85025 COMPLETE CBC W/AUTO DIFF WBC: CPT

## 2022-04-02 PROCEDURE — 96374 THER/PROPH/DIAG INJ IV PUSH: CPT

## 2022-04-02 PROCEDURE — 71045 X-RAY EXAM CHEST 1 VIEW: CPT

## 2022-04-02 PROCEDURE — 2580000003 HC RX 258: Performed by: STUDENT IN AN ORGANIZED HEALTH CARE EDUCATION/TRAINING PROGRAM

## 2022-04-02 PROCEDURE — 86850 RBC ANTIBODY SCREEN: CPT

## 2022-04-02 PROCEDURE — 83605 ASSAY OF LACTIC ACID: CPT

## 2022-04-02 PROCEDURE — 99284 EMERGENCY DEPT VISIT MOD MDM: CPT

## 2022-04-02 PROCEDURE — 96372 THER/PROPH/DIAG INJ SC/IM: CPT

## 2022-04-02 PROCEDURE — 96375 TX/PRO/DX INJ NEW DRUG ADDON: CPT

## 2022-04-02 PROCEDURE — 87635 SARS-COV-2 COVID-19 AMP PRB: CPT

## 2022-04-02 PROCEDURE — 2580000003 HC RX 258: Performed by: INTERNAL MEDICINE

## 2022-04-02 PROCEDURE — 84484 ASSAY OF TROPONIN QUANT: CPT

## 2022-04-02 PROCEDURE — 99223 1ST HOSP IP/OBS HIGH 75: CPT | Performed by: SURGERY

## 2022-04-02 PROCEDURE — 6360000004 HC RX CONTRAST MEDICATION: Performed by: RADIOLOGY

## 2022-04-02 PROCEDURE — 96366 THER/PROPH/DIAG IV INF ADDON: CPT

## 2022-04-02 PROCEDURE — 6370000000 HC RX 637 (ALT 250 FOR IP): Performed by: STUDENT IN AN ORGANIZED HEALTH CARE EDUCATION/TRAINING PROGRAM

## 2022-04-02 PROCEDURE — 36415 COLL VENOUS BLD VENIPUNCTURE: CPT

## 2022-04-02 PROCEDURE — 96368 THER/DIAG CONCURRENT INF: CPT

## 2022-04-02 PROCEDURE — 96365 THER/PROPH/DIAG IV INF INIT: CPT

## 2022-04-02 RX ORDER — MORPHINE SULFATE 2 MG/ML
4 INJECTION, SOLUTION INTRAMUSCULAR; INTRAVENOUS ONCE
Status: COMPLETED | OUTPATIENT
Start: 2022-04-02 | End: 2022-04-02

## 2022-04-02 RX ORDER — SODIUM CHLORIDE 0.9 % (FLUSH) 0.9 %
5-40 SYRINGE (ML) INJECTION PRN
Status: DISCONTINUED | OUTPATIENT
Start: 2022-04-02 | End: 2022-04-09 | Stop reason: HOSPADM

## 2022-04-02 RX ORDER — PANTOPRAZOLE SODIUM 40 MG/1
40 TABLET, DELAYED RELEASE ORAL DAILY
Status: DISCONTINUED | OUTPATIENT
Start: 2022-04-02 | End: 2022-04-09 | Stop reason: HOSPADM

## 2022-04-02 RX ORDER — ESMOLOL HYDROCHLORIDE 10 MG/ML
25-300 INJECTION, SOLUTION INTRAVENOUS CONTINUOUS
Status: DISCONTINUED | OUTPATIENT
Start: 2022-04-02 | End: 2022-04-06

## 2022-04-02 RX ORDER — SODIUM CHLORIDE 0.9 % (FLUSH) 0.9 %
5-40 SYRINGE (ML) INJECTION EVERY 12 HOURS SCHEDULED
Status: DISCONTINUED | OUTPATIENT
Start: 2022-04-02 | End: 2022-04-09 | Stop reason: HOSPADM

## 2022-04-02 RX ORDER — ACETAMINOPHEN 325 MG/1
650 TABLET ORAL EVERY 4 HOURS PRN
Status: DISCONTINUED | OUTPATIENT
Start: 2022-04-02 | End: 2022-04-07

## 2022-04-02 RX ORDER — ROSUVASTATIN CALCIUM 10 MG/1
10 TABLET, COATED ORAL NIGHTLY
Status: DISCONTINUED | OUTPATIENT
Start: 2022-04-02 | End: 2022-04-09 | Stop reason: HOSPADM

## 2022-04-02 RX ORDER — ONDANSETRON 2 MG/ML
4 INJECTION INTRAMUSCULAR; INTRAVENOUS EVERY 6 HOURS PRN
Status: DISCONTINUED | OUTPATIENT
Start: 2022-04-02 | End: 2022-04-09 | Stop reason: HOSPADM

## 2022-04-02 RX ORDER — HEPARIN SODIUM 10000 [USP'U]/ML
5000 INJECTION, SOLUTION INTRAVENOUS; SUBCUTANEOUS EVERY 8 HOURS SCHEDULED
Status: DISCONTINUED | OUTPATIENT
Start: 2022-04-02 | End: 2022-04-09 | Stop reason: HOSPADM

## 2022-04-02 RX ORDER — FERROUS SULFATE 325(65) MG
325 TABLET ORAL
Status: DISCONTINUED | OUTPATIENT
Start: 2022-04-03 | End: 2022-04-09 | Stop reason: HOSPADM

## 2022-04-02 RX ORDER — PAROXETINE HYDROCHLORIDE 20 MG/1
20 TABLET, FILM COATED ORAL DAILY
Status: DISCONTINUED | OUTPATIENT
Start: 2022-04-03 | End: 2022-04-09 | Stop reason: HOSPADM

## 2022-04-02 RX ORDER — ACETAMINOPHEN 325 MG/1
650 TABLET ORAL EVERY 6 HOURS PRN
Status: DISCONTINUED | OUTPATIENT
Start: 2022-04-02 | End: 2022-04-09 | Stop reason: HOSPADM

## 2022-04-02 RX ORDER — FENTANYL CITRATE 50 UG/ML
50 INJECTION, SOLUTION INTRAMUSCULAR; INTRAVENOUS ONCE
Status: COMPLETED | OUTPATIENT
Start: 2022-04-02 | End: 2022-04-02

## 2022-04-02 RX ORDER — ONDANSETRON 2 MG/ML
4 INJECTION INTRAMUSCULAR; INTRAVENOUS EVERY 6 HOURS PRN
Status: DISCONTINUED | OUTPATIENT
Start: 2022-04-02 | End: 2022-04-07

## 2022-04-02 RX ORDER — SODIUM CHLORIDE 9 MG/ML
INJECTION, SOLUTION INTRAVENOUS PRN
Status: DISCONTINUED | OUTPATIENT
Start: 2022-04-02 | End: 2022-04-09 | Stop reason: HOSPADM

## 2022-04-02 RX ORDER — MIRTAZAPINE 15 MG/1
7.5 TABLET, FILM COATED ORAL NIGHTLY
Status: DISCONTINUED | OUTPATIENT
Start: 2022-04-02 | End: 2022-04-09 | Stop reason: HOSPADM

## 2022-04-02 RX ORDER — POLYETHYLENE GLYCOL 3350 17 G/17G
17 POWDER, FOR SOLUTION ORAL DAILY PRN
Status: DISCONTINUED | OUTPATIENT
Start: 2022-04-02 | End: 2022-04-09 | Stop reason: HOSPADM

## 2022-04-02 RX ORDER — ACETAMINOPHEN 650 MG/1
650 SUPPOSITORY RECTAL EVERY 6 HOURS PRN
Status: DISCONTINUED | OUTPATIENT
Start: 2022-04-02 | End: 2022-04-09 | Stop reason: HOSPADM

## 2022-04-02 RX ORDER — FENTANYL CITRATE 50 UG/ML
INJECTION, SOLUTION INTRAMUSCULAR; INTRAVENOUS
Status: COMPLETED
Start: 2022-04-02 | End: 2022-04-02

## 2022-04-02 RX ORDER — HYDRALAZINE HYDROCHLORIDE 20 MG/ML
10 INJECTION INTRAMUSCULAR; INTRAVENOUS EVERY 10 MIN PRN
Status: DISCONTINUED | OUTPATIENT
Start: 2022-04-02 | End: 2022-04-06

## 2022-04-02 RX ORDER — POTASSIUM CHLORIDE 20 MEQ/1
40 TABLET, EXTENDED RELEASE ORAL ONCE
Status: COMPLETED | OUTPATIENT
Start: 2022-04-02 | End: 2022-04-02

## 2022-04-02 RX ORDER — MORPHINE SULFATE 2 MG/ML
1 INJECTION, SOLUTION INTRAMUSCULAR; INTRAVENOUS EVERY 4 HOURS PRN
Status: DISCONTINUED | OUTPATIENT
Start: 2022-04-02 | End: 2022-04-07

## 2022-04-02 RX ORDER — NITROGLYCERIN 20 MG/100ML
5-200 INJECTION INTRAVENOUS CONTINUOUS
Status: DISCONTINUED | OUTPATIENT
Start: 2022-04-02 | End: 2022-04-02

## 2022-04-02 RX ORDER — TRAMADOL HYDROCHLORIDE 50 MG/1
25 TABLET ORAL EVERY 6 HOURS PRN
Status: DISCONTINUED | OUTPATIENT
Start: 2022-04-02 | End: 2022-04-07

## 2022-04-02 RX ORDER — ONDANSETRON 4 MG/1
4 TABLET, ORALLY DISINTEGRATING ORAL EVERY 8 HOURS PRN
Status: DISCONTINUED | OUTPATIENT
Start: 2022-04-02 | End: 2022-04-09 | Stop reason: HOSPADM

## 2022-04-02 RX ORDER — ESMOLOL HYDROCHLORIDE 10 MG/ML
500 INJECTION INTRAVENOUS ONCE
Status: COMPLETED | OUTPATIENT
Start: 2022-04-02 | End: 2022-04-02

## 2022-04-02 RX ORDER — LABETALOL HYDROCHLORIDE 5 MG/ML
10 INJECTION, SOLUTION INTRAVENOUS EVERY 10 MIN PRN
Status: DISCONTINUED | OUTPATIENT
Start: 2022-04-02 | End: 2022-04-06

## 2022-04-02 RX ADMIN — ESMOLOL HYDROCHLORIDE IN SODIUM CHLORIDE 150 MCG/KG/MIN: 10 INJECTION INTRAVENOUS at 22:02

## 2022-04-02 RX ADMIN — SODIUM CHLORIDE, PRESERVATIVE FREE 10 ML: 5 INJECTION INTRAVENOUS at 20:46

## 2022-04-02 RX ADMIN — MIRTAZAPINE 7.5 MG: 15 TABLET, FILM COATED ORAL at 20:47

## 2022-04-02 RX ADMIN — SODIUM NITROPRUSSIDE 0.12 MCG/KG/MIN: 25 INJECTION, SOLUTION, CONCENTRATE INTRAVENOUS at 18:10

## 2022-04-02 RX ADMIN — ROSUVASTATIN CALCIUM 10 MG: 10 TABLET, FILM COATED ORAL at 20:51

## 2022-04-02 RX ADMIN — ESMOLOL HYDROCHLORIDE 37 MG: 10 INJECTION, SOLUTION INTRAVENOUS at 16:12

## 2022-04-02 RX ADMIN — FENTANYL CITRATE 50 MCG: 50 INJECTION, SOLUTION INTRAMUSCULAR; INTRAVENOUS at 16:05

## 2022-04-02 RX ADMIN — MORPHINE SULFATE 4 MG: 2 INJECTION, SOLUTION INTRAMUSCULAR; INTRAVENOUS at 17:03

## 2022-04-02 RX ADMIN — ESMOLOL HYDROCHLORIDE IN SODIUM CHLORIDE 300 MCG/KG/MIN: 10 INJECTION INTRAVENOUS at 18:37

## 2022-04-02 RX ADMIN — TRAMADOL HYDROCHLORIDE 25 MG: 50 TABLET, COATED ORAL at 20:51

## 2022-04-02 RX ADMIN — FENTANYL CITRATE 50 MCG: 0.05 INJECTION, SOLUTION INTRAMUSCULAR; INTRAVENOUS at 16:05

## 2022-04-02 RX ADMIN — ESMOLOL HYDROCHLORIDE IN SODIUM CHLORIDE 50 MCG/KG/MIN: 10 INJECTION INTRAVENOUS at 16:15

## 2022-04-02 RX ADMIN — PANTOPRAZOLE SODIUM 40 MG: 40 TABLET, DELAYED RELEASE ORAL at 18:48

## 2022-04-02 RX ADMIN — POTASSIUM CHLORIDE 40 MEQ: 20 TABLET, EXTENDED RELEASE ORAL at 17:05

## 2022-04-02 RX ADMIN — IOPAMIDOL 90 ML: 755 INJECTION, SOLUTION INTRAVENOUS at 15:56

## 2022-04-02 RX ADMIN — HEPARIN SODIUM 5000 UNITS: 10000 INJECTION, SOLUTION INTRAVENOUS; SUBCUTANEOUS at 20:51

## 2022-04-02 RX ADMIN — SODIUM NITROPRUSSIDE 0.25 MCG/KG/MIN: 25 INJECTION, SOLUTION, CONCENTRATE INTRAVENOUS at 17:11

## 2022-04-02 ASSESSMENT — ENCOUNTER SYMPTOMS
CONSTIPATION: 0
DIARRHEA: 0
RHINORRHEA: 0
ABDOMINAL DISTENTION: 0
EYE DISCHARGE: 0
NAUSEA: 0
BACK PAIN: 1
ANAL BLEEDING: 0
SHORTNESS OF BREATH: 0
COUGH: 0
SINUS PRESSURE: 0
CHEST TIGHTNESS: 0
VOMITING: 0
SINUS PAIN: 0
ABDOMINAL PAIN: 0

## 2022-04-02 ASSESSMENT — PAIN SCALES - GENERAL
PAINLEVEL_OUTOF10: 10
PAINLEVEL_OUTOF10: 10
PAINLEVEL_OUTOF10: 0
PAINLEVEL_OUTOF10: 10
PAINLEVEL_OUTOF10: 0
PAINLEVEL_OUTOF10: 10

## 2022-04-02 ASSESSMENT — PAIN DESCRIPTION - PROGRESSION
CLINICAL_PROGRESSION: GRADUALLY WORSENING
CLINICAL_PROGRESSION: GRADUALLY IMPROVING

## 2022-04-02 ASSESSMENT — PAIN DESCRIPTION - LOCATION
LOCATION: BACK

## 2022-04-02 ASSESSMENT — PAIN DESCRIPTION - FREQUENCY
FREQUENCY: CONTINUOUS

## 2022-04-02 ASSESSMENT — PAIN DESCRIPTION - PAIN TYPE
TYPE: ACUTE PAIN

## 2022-04-02 ASSESSMENT — PAIN DESCRIPTION - DESCRIPTORS
DESCRIPTORS: ACHING;THROBBING;SHARP
DESCRIPTORS: DISCOMFORT;DULL
DESCRIPTORS: ACHING;THROBBING

## 2022-04-02 ASSESSMENT — PAIN DESCRIPTION - ORIENTATION
ORIENTATION: MID

## 2022-04-02 NOTE — PROGRESS NOTES
Patient admitted to room 3820 from ED. Nurse to nurse received from ED RN. Esmolol and Nipride infusing. Assessment and vitals in progress.  Christina Varghese, RN

## 2022-04-02 NOTE — ED PROVIDER NOTES
ATTENDING PROVIDER ATTESTATION:     Michael Terrazas presented to the emergency department for evaluation of Back Pain (Patient states he has at dinner when he experienced sudden onset backpain and radiation to the chest that feels like pressure. States it is the worst pain of his life)   and was initially evaluated by the Medical Resident. See Original ED Note for H&P and ED course above. I have reviewed and discussed the case, including pertinent history (medical, surgical, family and social) and exam findings with the Medical Resident assigned to Michael Terrazas. I have personally performed and/or participated in the history, exam, medical decision making, and procedures and agree with all pertinent clinical information and any additional changes or corrections are noted below in my assessment and plan. I have discussed this patient in detail with the resident, and provided the instruction and education,     I have reviewed my findings and recommendations with the assigned Medical Resident, Michael Terrazas and members of family present at the time of disposition. I have performed a history and physical examination of this patient and directly supervised the resident caring for this patient    HPI  This is an 66-year-old male with past medical history of coronary artery disease and recent EGD who presents to the emergency department today with a chief complaint of sudden onset back pain. The patient states that 30 minutes prior to arrival he was sitting and had sudden onset left back pain which was a tearing sensation. Now radiated to his chest and it is a pressure. He continues to have pain in his back as well. Denies any recent trauma. Nothing seems to worsen or improve the pain. He has not tried any medication. Is not had pain like this in the past.  Denies any associated shortness of breath, fevers or cough. No nausea or vomiting. Having bowel movements. No dysuria or hematuria.   No numbness, tingling or weakness. Patient does have history of hypertension but did not take his medication today. He is not on any blood thinners. Of note, he did have an EGD yesterday with GI, Dr. Diogenes Rivero. ROS  A complete review of systems was performed and pertinent positives and negatives are stated within HPI, all other systems reviewed and are negative.    --------------------------------------------- PAST HISTORY ---------------------------------------------  Past Medical History:  has a past medical history of Acute pancreatitis, Anxiety, BPH (benign prostatic hyperplasia), CAD (coronary artery disease), GERD (gastroesophageal reflux disease), Hyperlipidemia, Obsessive compulsive disorder, and Psoriasis. Past Surgical History:  has a past surgical history that includes Colonoscopy (03/14/2017); Upper gastrointestinal endoscopy (03/14/2017); Throat surgery; and Cataract removal (Bilateral, 08/24/2017). Social History:  reports that he quit smoking about 52 years ago. His smoking use included cigarettes. He has never used smokeless tobacco. He reports current alcohol use. He reports that he does not use drugs. Family History: family history includes Asthma in his mother; Coronary Art Dis in his mother; Parkinsonism in his father. Unless otherwise noted, family history is non contributory    The patients home medications have been reviewed. Allergies: Patient has no known allergies. Physical Exam  General: The patient is conversational and lying comfortably in bed. Head: Normocephalic and atraumatic. Eyes: Sclera non-icteric and no conjunctival injection  ENT: Nasal and oral membranes moist  Neck: Trachea midline. No JVD  Respiratory: Lungs clear to auscultation bilaterally. Patient speaking in full sentences. Cardiovascular: Regular rate. No pedal edema. No palpable crepitus of the chest wall. 2+ radial pulses, 1+ symmetric DP pulses  Gastrointestinal:  Abdomen is soft and nondistended. Bowel sounds present. There is no tenderness. There is no guarding or rebound. Musculoskeletal: No deformity. No tenderness of the midline spine. No step-offs or deformities. Mild paraspinal muscle tenderness in the left thoracic region  Skin: Skin warm and dry. No rashes. Neurologic: No gross motor deficits. No aphasia. Symmetric movement of the extremities 5 out of 5 strength upper and lower. Sensation intact. Symmetric facies. Pupils equal and reactive to light. Alert and conversational  Psychiatric: Normal affect. MDM  This is a 80 y.o. male presenting to the ED for back pain and chest pain. On initial presentation, the patient's vital signs are interpreted as significantly hypertensive, afebrile and minimally hypoxic. Based on history and physical exam, we have a broad differential.  With the patient sudden onset pain we are concerned for aortic dissection. We will considered ACS, arrhythmia, pneumonia, PE, or perforation as the patient recent had EGD performed. Abdominal exam is nonsurgical at this time. No history of trauma. Distally neurovascularly intact. Chest x-ray, EKG, laboratory studies and CTA obtained. Patient given fentanyl for pain control. A 12-lead EKG was performed and interpreted by myself. The rate is 93 with normal sinus rhythm and normal axis. Patient does have PVCs noted. First-degree AV block. The MN interval is 256, QRS interval is 90, and QTC interval is 460. Patient has Q waves with T wave inversion in V1. This is very similar to prior. There is an incomplete right bundle branch block. No acute ST elevation or depression otherwise. Good R wave progression. This is sinus rhythm with first-degree AV block, PAC, and incomplete right bundle branch block. Laboratory studies show hypokalemia of 3.2. Troponin is 29. LFTs normal.  Patient does have a leukocytosis but no anemia. Chest x-ray shows no failure or pneumonia. Fibrotic changes.   Large hiatal hernia. CTA shows [place] B aortic syndrome due to focal area of intramural rupture in the mid distal segment of the descending thoracic aorta with large intramural dissecting hematoma without intimal flap. Extending just distal to the subclavian. Intramural hematoma forms and aneurysm. This is interpreted by radiology. Patient was immediately started on esmolol drip. As his blood pressures continue to be elevated he will be started on nitroprusside. Vascular surgery consulted. They will continue to follow. Potassium will be supplemented. Patient continues to have pain is given morphine. Repeat troponin 21. Covid negative. Resident physician spoke with the admitting team.  Patient will require CVICU. Patient is updated. Upon my evaluation, this patient had a high probability of imminent or life-threatening deterioration due to aortic hematoma with hypertension, which required my direct attention, intervention, and personal management. I have personally provided 38 Minutes of critical care time excluding time spent on separately billable procedures. Time includes review of laboratory data, radiology results, discussion with consultants, and monitoring for potential decompensation. Interventions were performed as documented above. I directly supervised any procedures performed by the resident and was present for the procedure including all critical portions of the procedure    The cardiac monitor revealed sinus rhythm with a heart rate in the 80s as interpreted by me. The cardiac monitor was ordered secondary to the patient's chest pain and to monitor the patient for dysrhythmia. CPT [id number]    I, Dr. Rebecca Mojica, am the primary provider of record    Impression  1. 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[facility] [address]    2.  Hiatal hernia              Rebecca Mojica MD  04/03/22 1027

## 2022-04-02 NOTE — CONSULTS
Vascular Surgery Consultation Note    Reason for Consult:  Type B dissection     HPI :    This is a 80 y.o. male who presented to the ED with acute onset back pain that started last night while he was at dinner. It was a constant, tearing pain. Patient also had EGD yesterday with Dr. Josr Ohara for known hiatal hernia. On arrival to ED, patient was hypertensive. 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 Vascular surgery is consulted for management of aortic dissection. ROS : Negative if blank [], Positive if [x]  General Vascular   [] Fevers [] Claudication (Blocks)   [] Chills [] Rest Pain   [] Weight Loss [] Tissue Loss   [x] Chest Pain [] Clotting Disorder    [] SOB at rest [] Leg Swelling   [] SOB with exertion [] DVT/PE      [] Nausea    [] Vomiting [] Stroke/TIA   [] Abdominal Pain [] Focal weakness   [] Melena [] Slurred Speech   [] Hematochezia [] Vision Changes   [] Hematuria    [] Dysuria [] Hx of Central Catheters   [] Wears Glasses/Contacts  [] Dialysis and If so date initiated   [] Blindness     [x] Right Hand Dominant   [] Difficulty swallowing        Past Medical History:   Diagnosis Date    Acute pancreatitis     6/06 AND 11/09    Anxiety     BPH (benign prostatic hyperplasia)     CAD (coronary artery disease)     GERD (gastroesophageal reflux disease)     Hyperlipidemia     Obsessive compulsive disorder     Psoriasis         Past Surgical History:   Procedure Laterality Date    CATARACT REMOVAL Bilateral 08/24/2017    COLONOSCOPY  03/14/2017    THROAT SURGERY      UPPER GASTROINTESTINAL ENDOSCOPY  03/14/2017     Current Medications:    esmolol 300 mcg/kg/min (04/02/22 1647)    nitroprusside (NIPRIDE) 50 mg in D5W infusion 0.25 mcg/kg/min (04/02/22 1711)      acetaminophen, traMADol, morphine, ondansetron        Allergies:  Patient has no known allergies.     Social History     Socioeconomic History    Marital status:      Spouse name: Not on file    Number of children: Not on file    Years of education: Not on file    Highest education level: Not on file   Occupational History    Not on file   Tobacco Use    Smoking status: Former Smoker     Types: Cigarettes     Quit date: 10/24/1969     Years since quittin.4    Smokeless tobacco: Never Used   Substance and Sexual Activity    Alcohol use: Yes     Comment: OCCASIONAL BEER    Drug use: No    Sexual activity: Not on file   Other Topics Concern    Not on file   Social History Narrative    Not on file     Social Determinants of Health     Financial Resource Strain: Low Risk     Difficulty of Paying Living Expenses: Not hard at all   Food Insecurity: No Food Insecurity    Worried About [address] in the Last Year: Never true    [address] in the Last Year: Never true   Transportation Needs:     Lack of Transportation (Medical): Not on file    Lack of Transportation (Non-Medical):  Not on file   Physical Activity:     Days of Exercise per Week: Not on file    Minutes of Exercise per Session: Not on file   Stress:     Feeling of Stress : Not on file   Social Connections:     Frequency of Communication with Friends and Family: Not on file    Frequency of Social Gatherings with Friends and Family: Not on file    Attends [employer] Services: Not on file    Active Member of [address] [place] [address] or Organizations: Not on file    Attends Club or Organization Meetings: Not on file    Marital Status: Not on file   Intimate Partner Violence:     Fear of Current or Ex-Partner: Not on file    Emotionally Abused: Not on file    Physically Abused: Not on file    Sexually Abused: Not on file   Housing Stability:     Unable to Pay for Housing in the Last Year: Not on file    Number of [place] in the Last Year: Not on file    Unstable Housing in the Last Year: Not on file        Family History   Problem Relation Age of Onset    Asthma Mother     Coronary Art Dis Mother     Parkinsonism Father        PHYSICAL EXAM:    BP (!) 162/105   Pulse 83   Resp 27   Wt 163 lb (73.9 kg)   SpO2 97%   BMI 24.78 kg/m²   CONSTITUTIONAL:  awake, alert, cooperative, no apparent distress, and appears stated age  Normal  EYES:  lids and lashes normal, sclera clear and conjunctiva normal  ENT:  normocepalic, without obvious abnormality, external ears without lesions  NECK:  supple, symmetrical, trachea midline,no jugular venous distension, no carotid bruits  HEMATOLOGIC/LYMPHATICS:  no cervical lymphadenopathy  LUNGS:  no increased work of breathing, good air exchange  CARDIOVASCULAR:  regular rate and rhythm   ABDOMEN:  soft, non-distended, non-tender, Aorta is not palpable  SKIN:  no bruising or bleeding  EXTREMITIES:   R UE Swelling absent Incisions absent       5/5 Strength  L UE Swelling absent Incisions absent       5/5 Strength  R LE Edema absent  Incisions absent    Varicose veins absent    Wounds absent, normalcaprefill   5/5 Strength, neuropathy is absent  L LE Edema absent  Incisions absent    Varicose veins absent    Wounds absent, normalcaprefill   5/5 Strength, neuropathy is absent  R brachial 2+ L brachial 2+   R radial 2+ L radial 2+   R femoral 2+ L femoral 2+   R popliteal 2+ L popliteal 2+   R posterior tibial 2+ L posterior tibial 2+   R dorsalis pedis 2+ L dorsalis pedis 2+     LABS:    Lab Results   Component Value Date    WBC 13.5 (H) 04/02/2022    HGB 13.5 04/02/2022    HCT 41.1 04/02/2022     04/02/2022    PROTIME 12.3 04/02/2022    INR 1.1 04/02/2022    K 3.2 (L) 04/02/2022    BUN 10 04/02/2022    CREATININE 0.9 04/02/2022       RADIOLOGY:  Imaging reviewed. Assesment/Plan  - admit to ICU  - maintain SBP < 120, continue esmolol and nitroprusside gtt  - prn pain control  - diet as tolerated     Discussed with Dr. Alistair Ba.      Jelena Cedeno MD    Pt seen and examined    Feeling better  bp better controlled sbp 120s    CTA reviewed  Dissection likely starts distal to left subclavian and extends down to level of diaphragm  Maximal diameter of descending thoracic aorta is 3.5 cm - proximally     BP (!) 113/59   Pulse 64   Temp 97.8 °F (36.6 °C) (Oral)   Resp 17   Ht 5' 8\" (1.727 m)   Wt 160 lb (72.6 kg)   SpO2 94%   BMI 24.33 kg/m²   CONSTITUTIONAL:   Awake, alert, cooperative  PSYCHIATRIC :  Oriented to time, place and person     Appropriate insight to disease process  EYES: Lids and lashes normal  ENT:  External ears and nose without lesions   Hearing deficits present  NECK: Supple, symmetrical, trachea midline   Thyroid goiter not appreciated  LUNGS:  No increased work of breathing   Good resp excursion  CARDIOVASCULAR:  regular rate and rhythm   ABDOMEN:  soft, non-distended, non-tender   Aorta is not palpable  Lymphatics : Cervical lymphadenopathy notnoted     Femoral lymphadenopathy notnoted  SKIN:   Normal skin color   Texture and turgor normal, no induration  EXTREMITIES:   R UE 5/5 strength   No cyanosis noted in nail beds  L UE 5/5 strength   No cyanosis noted in nail beds  R LE Edema absent  L LE Edema absent  R femoral 2+ L femoral 2+   R dorsalis pedis 2+ L dorsalis pedis 2+   R posterior tibial 2+ L posterior tibial 2+     A/P Acute descending thoracic aortic dissection  3.5 cm aneurysmal dilation  · Discussed with patient and family at the bedside, radha pictures to explain descending thoracic aortic dissection  · We did discus that likely etiology is HTN and not his recent scope  · We also discussed planned imaging fu in ~ 4-6 weeks, assuming stable, than 6 month and if stable at that point yearly to monitor for aneursymal degeneration  · sbp < 120  · Currently on drips, will wean as able and po metoprolol has been started  · Overall feeling better than yesterday  · Continue icu care  · Appreciate critical care input  · No plan for vascular surgical intervention at this time    Johnnie Lazo MD

## 2022-04-02 NOTE — CONSULTS
Surgical Intensive Care Unit  Consult Note    Date of admission:  4/2/2022     Reason for ICU transfer:  Type B aortic dissection    Subjective:  Pt is a 80year old male with PMHx CAD who had sudden back pain today on the left side which he describes as a tearing sensation. He says the pain radiates to his chest. He denies any recent trauma. He denies any abdominal pain, nausea, vomiting, GERD, SOB, fevers, or chills. He denies any weakness or paresthesias. He underwent a CT scan which showed an aortic dissection from the left subclavian artery to the distal aspect of the thoracic aorta. Hospital Course:  4/2: Admitted to CVICU with type B aortic dissection. Pain control PRN. Maintain SBP<120 and HR<75. Physical Exam:  /78   Pulse 71   Temp 97.6 °F (36.4 °C) (Oral)   Resp 17   Ht 5' 8\" (1.727 m)   Wt 163 lb (73.9 kg)   SpO2 99%   BMI 24.78 kg/m²     CONSTITUTIONAL:  Awake, alert  LUNGS:  On 2L NC  CARDIOVASCULAR:  RR and normotensive  ABDOMEN:  Soft, nontender, nondistended  MUSCULOSKELETAL:  There is no redness, warmth, or swelling of the joints. Full range of motion noted. Motor strength is 5 out of 5 all extremities bilaterally. Tone is normal.    ASSESSMENT / PLAN:  · Neuro: Acute pain secondary to type B aortic dissection, multimodal pain control PRN  · CV: Type B aortic dissection, maintain SBP<120 and HR<75, continue esmolol and nitroprusside gtt.  Hx HTN, CAD, and HLD, hold home meds  · Pulm: Acute hypoxic respiratory failure on 2L NC, wean as able  · GI: No acute issues, okay for diet as tolerated  · Renal: No acute issues  · ID: No acute issues  · Endo: No acute issues  · MSK: No acute issues, PT/OT as able    Bowel regime: None  Pain control/Sedation: Tylenol, Tramadol, Morphine   DVT prophylaxis: SCDs, Heparin   GI: Protonix  Glucose protocol:  None  Mouth/Eye care: As needed   [place]: None  CVC sites: None  Ancillary consults: IM, Vascular  Family Update: As available     Code status:   DNR-CCA    Electronically signed by Marc Brunner MD on 4/2/22 at 7:32 PM EDT

## 2022-04-02 NOTE — PLAN OF CARE
Problem: Pain:  Goal: Pain level will decrease  Description: Pain level will decrease  Outcome: Met This Shift  Goal: Control of acute pain  Description: Control of acute pain  Outcome: Met This Shift     Problem: Falls - Risk of:  Goal: Will remain free from falls  Description: Will remain free from falls  Outcome: Met This Shift     Problem: Cardiac:  Goal: Ability to maintain an adequate cardiac output will improve  Description: Ability to maintain an adequate cardiac output will improve  Outcome: Met This Shift

## 2022-04-02 NOTE — LETTER
[address] Medicaid  CERTIFICATION OF NECESSITY  FOR TRANSPORTATION   BY WHEELCHAIR VAN     Individual Information   1. Name: Virgie Dee 2. [place] Medicaid Billing Number:   3. Address: [address] [place] [address]      Transportation Provider Information   4. Provider Name: Flaquito Munguia   5. [place] Medicaid Provider Number:  National Provider Identifier (NPI):     Certification  7. Criteria:  By signing this document, the practitioner certifies that two statements are true:  A. This individual must be accompanied by a mobility-related assistive device from the point of pick-up to the point of drop-off. B. Transport of this individual by standard passenger vehicle or common carrier is precluded or contraindicated. 8. Period Beginning Date: 4/8/22   9. Length  [x] Not more than 10 day(s)  [] One Year     Additional Information Relevant to Certification   10. Comments or Explanations, If Necessary or Appropriate     Weakness, high risk for falls     Certifying Practitioner Information   11. Name of Practitioner: Dr Shital Ramachandran   12. [place] Medicaid Provider Number, If Applicable:  [address] Provider Identifier (NPI):      Signature Information   14. Date of Signature: mic prado 15. Name of Person Signing: Lori Montana   79. Signature and Professional Designation: Electronically signed by Lori Montana RN on 4/8/2022 at 3:50 PM     [facility] [id number]  Rev. 7/2015      [facility] Encounter Date/Time: 4/2/2022 [address] Account: [de-identified]    MRN: [id number]    Patient: Shmuel Gage    Contact Serial #: [id number]      ENCOUNTER          Patient Class: I Private Enc?   No Unit  [facility] Tian Saini Service: MED   Encounter DX: Hiatal hernia [K44.9]   ADM Provider: Papa Solorio MD   Procedure:     ATT Provider: Manuel Pablo MD   REF Provider:        Admission DX: Hiatal hernia, Aortic dissection ([facility] [address] 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[facility] and DX codes: K44.9, I71.00, I71.03      PATIENT                 Name: Emily Spangler : 1935 (80 yrs)   Address: [facility] [address] Sex: Male   [place] [address]         Marital Status:    Employer: RETIRED         Scientology: Pentecostal   Primary Care Provider: Zander Blount DO         Primary Phone: [phone]   EMERGENCY CONTACT   Contact Name Legal Guardian? Relationship to Patient Home Phone Work Phone   1. octavio hector  2. Flores Smith No    Niece/Nephew  Niece/Nephew [phone]                 GUARANTOR            Guarantor: Shoshana Dee     : 1935   Address: [address] Sex: Male     [facility] [address]     Relation to Patient: Self       Home Phone: [phone]   Guarantor ID: [id number]       Work Phone:     Guarantor Employer: [employer]         Status: RETIRED      COVERAGE        PRIMARY INSURANCE   Payor: [facility] MEDICARE Plan: COURTNEY CASTILLO*   Payor Address: [facility] [id number] [place] [address]       Group Number: [id number] Type: INDEMNITY   Subscriber Name: Amaya Malathi Subscriber : 1935   Subscriber ID: [id number] [place]. Rel. to Sub: Self   SECONDARY INSURANCE   Payor:   Plan:     Payor Address:  ,           Group Number:   Insurance Type:     Subscriber Name:   Subscriber :     Subscriber ID:   Pat.  Rel. to Sub:             CSN: [id number]

## 2022-04-02 NOTE — ED PROVIDER NOTES
Radiology Procedure Waiver   Name: Messi Askew  : 1935  MRN: [id number]    Date:  22    Time: 3:30 PM EDT    Benefits of immediately proceeding with Radiology exam(s) without pre-testing outweigh the risks or are not indicated as specified below and therefore the following is/are being waived:    [] Pregnancy test   [] Patients LMP on-time and regular.   [] Patient had Tubal Ligation or has other Contraception Device. [] Patient  is Menopausal or Premenarcheal.    [] Patient had Full or Partial Hysterectomy. [] Protocol for Iodine allergy    [] MRI Questionnaire     [x] BUN/Creatinine   [] Patient age w/no hx of renal dysfunction. [] Patient on Dialysis. [] Recent Normal Labs. Electronically signed by Aura Luna MD on 22 at 3:30 PM EDT               Aura Luna MD  Resident  22 1530        HPI     Patient is a 80 y.o. male presents with a chief complaint of chest pain  This has been occurring for a few hours. Patient states that it gets better with nothing. Patient states that it gets worse with nothing. Patient states that it is moderate in severity. Patient states it was gradual in onset. Patient presents with chest pain radiating to his back. Patient stated that he had endoscopy yesterday. [facility] radiating into his back. Patient denies any abdominal pain. Patient states he has never had pain like this before. Patient denies any changes in urinary or bowel habits. Patient states the pain is extreme and he is worried he is going to die. Denies any fevers, chills, nausea, vomiting abdominal pain. Patient does not take any blood thinners  Review of Systems   Constitutional: Negative for activity change, appetite change, fatigue and fever. HENT: Negative for congestion, rhinorrhea, sinus pressure and sinus pain. Eyes: Negative for discharge.    Respiratory: Negative for cough, chest tightness and shortness of breath. Cardiovascular: Positive for chest pain. Negative for palpitations. Gastrointestinal: Negative for abdominal distention, abdominal pain, anal bleeding, constipation, diarrhea, nausea and vomiting. Endocrine: Negative for polydipsia and polyuria. Genitourinary: Negative for decreased urine volume, difficulty urinating, enuresis, flank pain, frequency and urgency. Musculoskeletal: Positive for back pain. Negative for arthralgias and neck stiffness. Skin: Negative for rash and wound. Neurological: Negative for dizziness, weakness, light-headedness and headaches. Psychiatric/Behavioral: Negative for agitation, behavioral problems and confusion. Physical Exam  Vitals and nursing note reviewed. Constitutional:       General: He is in acute distress. Appearance: He is well-developed. He is ill-appearing. He is not toxic-appearing. HENT:      Head: Normocephalic and atraumatic. Eyes:      Conjunctiva/sclera: Conjunctivae normal.   Cardiovascular:      Rate and Rhythm: Normal rate and regular rhythm. Heart sounds: Normal heart sounds. No murmur heard. Pulmonary:      Effort: Pulmonary effort is normal. No respiratory distress. Breath sounds: Normal breath sounds. No wheezing or rales. Abdominal:      General: Bowel sounds are normal.      Palpations: Abdomen is soft. Tenderness: There is no abdominal tenderness. There is no guarding or rebound. Comments: Patient has no abdominal pain to palpation   Musculoskeletal:         General: No tenderness or deformity. Cervical back: Normal range of motion and neck supple. Skin:     General: Skin is warm and dry. Neurological:      Mental Status: He is alert and oriented to person, place, and time. Cranial Nerves: No cranial nerve deficit.       Coordination: Coordination normal.          Procedures   Patient immediately had FAST exam done that showed no obvious AAA or signs of free fluid  [facility]     ED Course as of 04/02/22 1550   Sat Apr 02, 2022   1545 EKG read interpreted by me. Rate 93 bpm.  Normal axis. Normal sinus rhythm. First-degree AV block. Incomplete right bundle branch block. Nonspecific T wave abnormality. Significant baseline artifact. Compared to prior EKG with no significant changes [JM]      ED Course User Index  [JM] Angeline Florian MD      Patient is a 80 y.o. male presenting with severe chest pain radiating to the back. Initial concern was for possible dissection. Patient had a chest x-ray that indicated large hiatal hernia. Patient had a CTA of the chest and abdomen with labs waived. Patient went to CAT scan and had all labs drawn. Patient's CAT scan was concerning for a aortic dissection. Vascular surgery was immediately consulted. Patient had a type B dissection and was started on esmolol drip. Patient had blood pressure below 469 systolic with a goal heart rate of below 60. Patient required nitroprusside for adequate blood pressure and heart rate control. Patient other labs were benign. Patient was given fentanyl and morphine for pain control. Patient was then admitted to the CVICU. Of note patient did have a significant hiatal hernia noted. Patient was seen and evaluated by myself and my attending Gregg Seo MD. Assessment and Plan discussed with attending provider, please see attestation for final plan of care. This note was done using dictation software and there may be some grammatical errors associated with this. Angeline Florian MD       ED Course as of 04/02/22 2351   Sat Apr 02, 2022   1545 EKG read interpreted by me. Rate 93 bpm.  Normal axis. Normal sinus rhythm. First-degree AV block. Incomplete right bundle branch block. Nonspecific T wave abnormality. Significant baseline artifact.   Compared to prior EKG with no significant changes [JM]      ED Course User Index  [JM] Angeline Florian MD --------------------------------------------- PAST HISTORY ---------------------------------------------  Past Medical History:  has a past medical history of Acute pancreatitis, Anxiety, BPH (benign prostatic hyperplasia), CAD (coronary artery disease), GERD (gastroesophageal reflux disease), Hyperlipidemia, Obsessive compulsive disorder, and Psoriasis. Past Surgical History:  has a past surgical history that includes Colonoscopy (03/14/2017); Upper gastrointestinal endoscopy (03/14/2017); Throat surgery; and Cataract removal (Bilateral, 08/24/2017). Social History:  reports that he quit smoking about 52 years ago. His smoking use included cigarettes. He has never used smokeless tobacco. He reports current alcohol use. He reports that he does not use drugs. Family History: family history includes Asthma in his mother; Coronary Art Dis in his mother; Parkinsonism in his father. The patients home medications have been reviewed. Allergies: Patient has no known allergies.     -------------------------------------------------- RESULTS -------------------------------------------------    Lab  Results for orders placed or performed during the hospital encounter of 04/02/22   COVID-19, Rapid    Specimen: Nasopharyngeal Swab   Result Value Ref Range    SARS-CoV-2, NAAT Not Detected Not Detected   Troponin   Result Value Ref Range    Troponin, High Sensitivity 29 (H) 0 - 11 ng/L   CBC with Auto Differential   Result Value Ref Range    WBC 13.5 (H) 4.5 - 11.5 E9/L    RBC 4.45 3.80 - 5.80 E12/L    Hemoglobin 13.5 12.5 - 16.5 g/dL    Hematocrit 41.1 37.0 - 54.0 %    MCV 92.4 80.0 - 99.9 fL    MCH 30.3 26.0 - 35.0 pg    MCHC 32.8 32.0 - 34.5 %    RDW 14.8 11.5 - 15.0 fL    Platelets 567 931 - 246 E9/L    MPV 10.7 7.0 - 12.0 fL    Neutrophils % 50.9 43.0 - 80.0 %    Lymphocytes % 17.5 (L) 20.0 - 42.0 %    Monocytes % 5.3 2.0 - 12.0 %    Eosinophils % 2.6 0.0 - 6.0 %    Basophils % 1.7 0.0 - 2.0 % Neutrophils Absolute 8.10 (H) 1.80 - 7.30 E9/L    Lymphocytes Absolute 4.19 (H) 1.50 - 4.00 E9/L    Monocytes Absolute 0.68 0.10 - 0.95 E9/L    Eosinophils Absolute 0.35 0.05 - 0.50 E9/L    Basophils Absolute 0.23 (H) 0.00 - 0.20 E9/L    Atypical Lymphocytes Relative 13.2 (H) 0.0 - 4.0 %    Metamyelocytes Relative 7.0 (H) 0.0 - 1.0 %    Myelocyte Percent 1.8 0 - 0 %    Anisocytosis 1+     Polychromasia 1+     Poikilocytes 1+     [place] Cells 1+     Ovalocytes 1+    Comprehensive Metabolic Panel   Result Value Ref Range    Sodium 138 132 - 146 mmol/L    Potassium 3.2 (L) 3.5 - 5.0 mmol/L    Chloride 103 98 - 107 mmol/L    CO2 22 22 - 29 mmol/L    Anion Gap 13 7 - 16 mmol/L    Glucose 98 74 - 99 mg/dL    BUN 10 6 - 23 mg/dL    CREATININE 0.9 0.7 - 1.2 mg/dL    GFR Non-African American >60 >=60 mL/min/1.73    GFR African American >60     Calcium 9.0 8.6 - 10.2 mg/dL    Total Protein 7.7 6.4 - 8.3 g/dL    Albumin 3.9 3.5 - 5.2 g/dL    Total Bilirubin 0.9 0.0 - 1.2 mg/dL    Alkaline Phosphatase 86 40 - 129 U/L    ALT 15 0 - 40 U/L    AST 26 0 - 39 U/L   Protime-INR   Result Value Ref Range    Protime 12.3 9.3 - 12.4 sec    INR 1.1    APTT   Result Value Ref Range    aPTT 27.1 24.5 - 35.1 sec   Lactic Acid   Result Value Ref Range    Lactic Acid 2.0 0.5 - 2.2 mmol/L   Troponin   Result Value Ref Range    Troponin, High Sensitivity 21 (H) 0 - 11 ng/L   Troponin   Result Value Ref Range    Troponin, High Sensitivity 24 (H) 0 - 11 ng/L   TYPE AND SCREEN   Result Value Ref Range    ABO/Rh A NEG     Antibody Screen NEG        Radiology  CTA CHEST W CONTRAST   Final Result   1.   Presence of a [place] B acute aortic syndrome due to a focal area of   intimal rupture in the mid distal segment of the descending thoracic aorta   forming a large intramural dissecting hematoma without an intimal flap   formation, extending from just distal to the left subclavian artery to the   distal thoracic aorta just proximal to the most upper abdominal aorta, as   above described. 2.  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 3.  Preliminary report was given to the ER physician in [facility], in   charge of the patient, at the beginning of the interpretation. .         CTA ABDOMEN PELVIS W CONTRAST   Final Result   1. No continuation of the intramural hematoma/intramural dissection of the   descending thoracic aorta into the abdominal aorta. See report of the CTA   chest performed the same location. 2.  Atherosclerotic changes throughout the abdominal aorta and the mesenteric   vessels and renal arteries and iliac arteries as above commented the. The      3. 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 Ectasia tortuous of the common iliac   arteries. No aortic occlusive process. 4.  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 5.  Moderate-to-severe anatomic stenosis in the origin of the left renal   artery by calcified and noncalcified plaques. 6.  Large size prostate gland impressing the base of the bladder. 7.  Left inguinal hernia with herniation of the proximal sigmoid colon   without signs for obstruction or incarceration. 8.  No indication for acute intraperitoneal retroperitoneal process in the   abdomen or in the pelvis. XR CHEST PORTABLE   Final Result   1. There are no findings of failure or pneumonia   2. Mild interstitial fibrotic changes seen within the lung bases   3. Large hiatal hernia               ------------------------- NURSING NOTES AND VITALS REVIEWED ---------------------------  Date / Time Roomed:  4/2/2022  3:13 PM  ED Bed Assignment:  [id number]/[id number]-A    The nursing notes within the ED encounter and vital signs as below have been reviewed.    Patient Vitals for the past 24 hrs:   BP Temp Temp src Pulse Resp SpO2 Height Weight 04/02/22 2200 109/63 -- -- 77 16 96 % -- --   04/02/22 2100 129/69 -- -- 72 16 -- -- --   04/02/22 2000 112/66 97.7 °F (36.5 °C) Oral 75 14 99 % -- --   04/02/22 1908 -- -- -- -- -- -- 5' 8\" (1.727 m) 163 lb (73.9 kg)   04/02/22 1900 129/78 -- -- 71 17 99 % -- --   04/02/22 1845 130/86 -- -- 74 19 100 % -- --   04/02/22 1830 (!) 134/90 97.6 °F (36.4 °C) Oral 147 19 91 % -- --   04/02/22 1806 132/87 -- -- 74 17 98 % -- --   04/02/22 1801 120/82 -- -- 76 17 98 % -- --   04/02/22 1758 -- -- -- -- -- 97 % -- --   04/02/22 1757 120/80 -- -- 76 16 (!) 87 % -- --   04/02/22 1742 111/73 -- -- 78 16 -- -- --   04/02/22 1736 114/78 -- -- 81 18 98 % -- --   04/02/22 1733 113/74 -- -- 77 16 97 % -- --   04/02/22 1727 91/64 -- -- 80 20 95 % -- --   04/02/22 1722 125/80 -- -- 79 17 100 % -- --   04/02/22 1707 (!) 162/105 -- -- 83 27 97 % -- --   04/02/22 1702 (!) 182/109 -- -- 80 19 98 % -- --   04/02/22 1657 (!) 178/109 -- -- 79 18 99 % -- --   04/02/22 1647 (!) 190/112 -- -- 81 19 97 % -- --   04/02/22 1642 (!) 172/115 -- -- 81 19 98 % -- --   04/02/22 1637 (!) 169/107 -- -- 82 18 98 % -- --   04/02/22 1632 (!) 188/107 -- -- 80 17 99 % -- --   04/02/22 1627 (!) 174/107 -- -- 83 18 98 % -- --   04/02/22 1621 (!) 183/107 -- -- 82 17 98 % -- --   04/02/22 1616 (!) 178/102 -- -- 86 18 95 % -- --   04/02/22 1614 (!) 187/116 -- -- 91 18 97 % -- --   04/02/22 1611 (!) 185/105 -- -- 92 20 96 % -- --   04/02/22 1609 (!) 175/119 -- -- 92 21 97 % -- --   04/02/22 1604 (!) 182/98 -- -- 90 18 98 % -- 163 lb (73.9 kg)   04/02/22 1557 (!) 207/110 -- -- 112 24 96 % -- --   04/02/22 1512 -- -- -- 66 -- 94 % -- --       Oxygen Saturation Interpretation: Normal      ------------------------------------------ PROGRESS NOTES ------------------------------------------  Re-evaluation(s):    Patients symptoms are improving  Repeat physical examination is improved      I have spoken with the patient and discussed todays results, in addition to providing specific details for the plan of care and counseling regarding the diagnosis and prognosis. Their questions are answered at this time and they are agreeable with the plan.      --------------------------------- ADDITIONAL PROVIDER NOTES ---------------------------------  Consultations:  Spoke with Dr. Silva Thomas,  They will provide consultation. This patient's ED course included: a personal history and physicial examination, re-evaluation prior to disposition, multiple bedside re-evaluations, IV medications, cardiac monitoring, continuous pulse oximetry and complex medical decision making and emergency management    This patient has remained hemodynamically stable and improved during their ED course. Please note that the withdrawal or failure to initiate urgent interventions for this patient would likely result in a life threatening deterioration or permanent disability. Accordingly this patient received 36 minutes of critical care time, excluding separately billable procedures. Clinical Impression  1. Hiatal hernia    2.  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HCC)          Disposition  Patient's disposition: Admit to CCU/ICU  Patient's condition is critical.         Aubrey Ospina MD  Resident  04/02/22 5828

## 2022-04-02 NOTE — H&P
Hospitalist History & Physical      PCP: Kylie Lindsey DO    Date of Service: Pt seen/examined on 4/2/2022 and is Admitted to Inpatient with expected LOS greater than two midnights due to medical therapy. Chief Complaint:  had concerns including Back Pain (Patient states he has at dinner when he experienced sudden onset backpain and radiation to the chest that feels like pressure. States it is the worst pain of his life). History Of Present Illness:    Mr. Lowell Pinzon, a 80y.o. year old male  who  has a past medical history of Acute pancreatitis, Anxiety, BPH (benign prostatic hyperplasia), CAD (coronary artery disease), GERD (gastroesophageal reflux disease), Hyperlipidemia, Obsessive compulsive disorder, and Psoriasis. who presents to the hospital with complaints of back pain that radiated to his left ribs. The patient states that he was in a restaurant and when he leaned over to cut his sand which then the pain started; he described the pain as squeezing. The patient recently had an EGD yesterday with Dr. Bucky Marshall. The patient was able to walk to the car and was driven to the ED. 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 The patient was noted to have elevated BP: 207/110 was started on esmolol and nitroprusside in the ED. Pain mgmt, the patient was started on morphine and given potassium supplementation. ROS: No fever or chills. No abd pain. No constipation or diarrhea. No urinary difficulties.        Past Medical History:   Diagnosis Date    Acute pancreatitis     6/06 AND 11/09    Anxiety     BPH (benign prostatic hyperplasia)     CAD (coronary artery disease)     GERD (gastroesophageal reflux disease)     Hyperlipidemia     Obsessive compulsive disorder     Psoriasis        Past Surgical History:   Procedure Laterality Date    CATARACT REMOVAL Bilateral 08/24/2017    COLONOSCOPY  03/14/2017    THROAT SURGERY      UPPER GASTROINTESTINAL ENDOSCOPY  03/14/2017       Prior to Admission medications    Medication Sig Start Date End Date Taking? Authorizing Provider   mirtazapine (REMERON) 7.5 MG tablet Take 1 tablet by mouth nightly 3/29/22   Jesus Kenney DO   ACIDOPHILUS LACTOBACILLUS PO Take by mouth in the morning and at bedtime    Historical Provider, MD   Bismuth Subsalicylate (PEPTO-BISMOL PO) Take by mouth as needed    Historical Provider, MD   lisinopril (PRINIVIL;ZESTRIL) 30 MG tablet Take 1 tablet by mouth daily 3/25/22   Amadeo Chiu DO   colesevelam (WELCHOL) 625 MG tablet Take 1 tablet by mouth 2 times daily (with meals) 3/25/22   Jesus Kenney DO   clobetasol (TEMOVATE) 0.05 % ointment Apply topically as needed Apply topically 2 times daily. Historical Provider, MD   pantoprazole (PROTONIX) 40 MG tablet TAKE 1 TABLET BY MOUTH  DAILY 1/3/22   Jesus Morrow,    rosuvastatin (CRESTOR) 10 MG tablet TAKE 1 TABLET BY MOUTH AT  NIGHT 1/3/22   Jesus Kenney DO   PARoxetine (PAXIL) 20 MG tablet TAKE 1 TABLET BY MOUTH ONCE DAILY 8/23/21   Amadeo Chiu DO   etanercept (ENBREL) 50 MG/ML injection Inject 25 mg into the skin once a week   Patient not taking: Reported on 3/25/2022    Historical Provider, MD   miconazole (REMEDY PHYTOPLEX ANTIFUNGAL) 2 % powder Apply topically as needed Apply topically 2 times daily.      Historical Provider, MD   Multiple Vitamins-Minerals (VITEYES AREDS FORMULA/LUTEIN PO) Take by mouth    Historical Provider, MD   triamcinolone (KENALOG) 0.1 % cream Apply topically as needed  7/28/19 Historical Provider, MD   fluocinonide (LIDEX) 0.05 % external solution Apply topically as needed Apply topically 2 times daily. Historical Provider, MD   FERROUS SULFATE PO Take 1 tablet by mouth daily     Historical Provider, MD   sodium chloride (OCEAN, BABY AYR) 0.65 % nasal spray 1 spray by Nasal route as needed for Congestion    Historical Provider, MD   hydrocortisone 2.5 % cream Apply 2.5 Units topically as needed  10/20/17   Mario Peterson MD   ketoconazole (NIZORAL) 2 % cream Apply 2 Tubes topically as needed  10/20/17   Mario Provider, MD   ketoconazole (NIZORAL) 2 % shampoo Apply 2 Tubes topically Twice a Week  10/20/17   Historical Provider, MD         Allergies:  Patient has no known allergies. Social History:    TOBACCO:   reports that he quit smoking about 52 years ago. His smoking use included cigarettes. He has never used smokeless tobacco.  ETOH:   reports current alcohol use. Family History:    Reviewed in detail and negative for DM, CAD, Cancer, CVA. Positive as follows\"      Problem Relation Age of Onset    Asthma Mother     Coronary Art Dis Mother     Parkinsonism Father        REVIEW OF SYSTEMS:   Pertinent positives as noted in the HPI. All other systems reviewed and negative. PHYSICAL EXAM:  BP (!) 169/107   Pulse 82   Resp 18   Wt 163 lb (73.9 kg)   SpO2 98%   BMI 24.78 kg/m²   General appearance: No apparent distress, appears stated age and cooperative. HEENT: Normal cephalic, atraumatic without obvious deformity. Pupils equal, round, and reactive to light. Extra ocular muscles intact. Conjunctivae/corneas clear. Neck: Supple, with full range of motion. No jugular venous distention. Trachea midline. Respiratory:  CTA (b/l)   Cardiovascular:  S1, S2 are present   Abdomen:  Soft +BS NT/ND +hernia   Musculoskeletal: No clubbing, cyanosis, edema of bilateral lower extremities. Brisk capillary refill. Skin: Normal skin color.   No rashes or lesions. Neurologic:  Neurovascularly intact without any focal sensory/motor deficits. Cranial nerves: II-XII intact, grossly non-focal.  Psychiatric: Alert and oriented, thought content appropriate, normal insight    Reviewed EKG and CXR personally      CBC:   Recent Labs     04/02/22  1527   WBC 13.5*   RBC 4.45   HGB 13.5   HCT 41.1   MCV 92.4   RDW 14.8        BMP:   Recent Labs     04/02/22  1527      K 3.2*      CO2 22   BUN 10   CREATININE 0.9     LFT:  Recent Labs     04/02/22  1527   PROT 7.7   ALKPHOS 86   ALT 15   AST 26   BILITOT 0.9     CE:  No results for input(s): Nya Loomis in the last 72 hours. PT/INR:   Recent Labs     04/02/22  1527   INR 1.1   APTT 27.1     BNP: No results for input(s): BNP in the last 72 hours. ESR:   Lab Results   Component Value Date    SEDRATE 11 06/26/2019     CRP:   Lab Results   Component Value Date    CRP 0.6 06/26/2019     D Dimer: No results found for: DDIMER   Folate and B12: No results found for: [id number], No results found for: FOLATE  Lactic Acid:   Lab Results   Component Value Date    LACTA 2.0 04/02/2022     Thyroid Studies: No results found for: TSH, Taisha Solian    Oupatient labs:  Lab Results   Component Value Date    CHOL 143 01/18/2021    TRIG 89 01/18/2021    HDL 51 01/18/2021    LDLCALC 74 01/18/2021    PSA 1.88 10/21/2015    INR 1.1 04/02/2022       Urinalysis:    Lab Results   Component Value Date    NITRU Negative 05/16/2019    BLOODU Negative 05/16/2019    SPECGRAV <=1.005 05/16/2019    GLUCOSEU Negative 05/16/2019       Imaging:  XR CHEST PORTABLE    Result Date: 4/2/2022  EXAMINATION: ONE XRAY VIEW OF THE CHEST 4/2/2022 3:37 pm COMPARISON: 03/12/2021 HISTORY: ORDERING SYSTEM PROVIDED HISTORY: cp TECHNOLOGIST PROVIDED HISTORY: Reason for exam:->cp What reading provider will be dictating this exam?->CRC FINDINGS: The cardiac silhouette is within normals. There is a large hiatal hernia.  There is no right or left lung infiltrate. Mild interstitial fibrotic changes are noted which are confirmed on the prior CT scan of 03/12/2021. There is no pleural effusion. 1. There are no findings of failure or pneumonia 2. Mild interstitial fibrotic changes seen within the lung bases 3. Large hiatal hernia       ASSESSMENT:    Principal Problem: Aortic dissection (HCC)  Resolved Problems:    * No resolved hospital problems. *      PLAN:  1.) Aortic dissection with hematoma: Patient transferred to the SICU. Vascular/ICU on consult. C/w anti-hypertensive mgmt. Analgesics PRN. Telemetry. 2.) Back/CP secondary to #1; C/w trending troponins. 6/3/21: EF: 50%   3.) HTN: C/w esmolol and nitroprusside started in the ED. Patient's home dose of lisinopril was not ordered. 4.) GERD: Protonix. 5.) Hypokalemia: Supplemented. 6.)  Psoriasis: Patient follows with dermatology OP.   7.) Anemia: Monitor patient's Hb/HCT. Will check iron studies. Patient has been taking daily iron OP.     8.) HLD: C/w crestor. 9.) Depression: C/w remeron and paxil. 10.) DVT proph:Scds ordered, As per vascular/ICU. Diet: No diet orders on file  Code Status: Prior    The patient decided to be DNR CCA; nephew was present. Surrogate decision maker confirmed with patient:   Extended Emergency Contact Information  Primary Emergency Contact: octavio hector Phone: [phone]  Relation: Niece/Nephew   needed? No    DVT Prophylaxis: []Lovenox []Heparin []PCD [] [address] []Encouraged ambulation  Disposition: []Med/Surg [] Intermediate [] ICU/CCU  Admit status: [] Observation [] Inpatient     +++++++++++++++++++++++++++++++++++++++++++++++++  Brissa Martin MD  +++++++++++++++++++++++++++++++++++++++++++++++++  NOTE: This report was transcribed using voice recognition software. Every effort was made to ensure accuracy; however, inadvertent computerized transcription errors may be present.

## 2022-04-03 PROBLEM — I71.012 DESCENDING THORACIC AORTIC DISSECTION (HCC): Status: ACTIVE | Noted: 2022-04-02

## 2022-04-03 LAB
ALBUMIN SERPL-MCNC: 2.8 G/DL (ref 3.5–5.2)
ALP BLD-CCNC: 55 U/L (ref 40–129)
ALT SERPL-CCNC: 9 U/L (ref 0–40)
ANION GAP SERPL CALCULATED.3IONS-SCNC: 8 MMOL/L (ref 7–16)
AST SERPL-CCNC: 21 U/L (ref 0–39)
BASOPHILS ABSOLUTE: 0.03 E9/L (ref 0–0.2)
BASOPHILS RELATIVE PERCENT: 0.4 % (ref 0–2)
BILIRUB SERPL-MCNC: 0.8 MG/DL (ref 0–1.2)
BUN BLDV-MCNC: 10 MG/DL (ref 6–23)
CALCIUM SERPL-MCNC: 7.5 MG/DL (ref 8.6–10.2)
CHLORIDE BLD-SCNC: 111 MMOL/L (ref 98–107)
CHOLESTEROL, TOTAL: 116 MG/DL (ref 0–199)
CO2: 22 MMOL/L (ref 22–29)
CREAT SERPL-MCNC: 0.9 MG/DL (ref 0.7–1.2)
EOSINOPHILS ABSOLUTE: 0.05 E9/L (ref 0.05–0.5)
EOSINOPHILS RELATIVE PERCENT: 0.6 % (ref 0–6)
FERRITIN: 169 NG/ML
GFR AFRICAN AMERICAN: >60
GFR NON-AFRICAN AMERICAN: >60 ML/MIN/1.73
GLUCOSE BLD-MCNC: 98 MG/DL (ref 74–99)
HCT VFR BLD CALC: 32.6 % (ref 37–54)
HDLC SERPL-MCNC: 40 MG/DL
HEMOGLOBIN: 10.6 G/DL (ref 12.5–16.5)
IMMATURE GRANULOCYTES #: 0.02 E9/L
IMMATURE GRANULOCYTES %: 0.2 % (ref 0–5)
INR BLD: 1.3
IRON SATURATION: 18 % (ref 20–55)
IRON: 39 MCG/DL (ref 59–158)
LDL CHOLESTEROL CALCULATED: 62 MG/DL (ref 0–99)
LYMPHOCYTES ABSOLUTE: 2.24 E9/L (ref 1.5–4)
LYMPHOCYTES RELATIVE PERCENT: 27.3 % (ref 20–42)
MAGNESIUM: 1.9 MG/DL (ref 1.6–2.6)
MCH RBC QN AUTO: 30.9 PG (ref 26–35)
MCHC RBC AUTO-ENTMCNC: 32.5 % (ref 32–34.5)
MCV RBC AUTO: 95 FL (ref 80–99.9)
MONOCYTES ABSOLUTE: 1.33 E9/L (ref 0.1–0.95)
MONOCYTES RELATIVE PERCENT: 16.2 % (ref 2–12)
NEUTROPHILS ABSOLUTE: 4.54 E9/L (ref 1.8–7.3)
NEUTROPHILS RELATIVE PERCENT: 55.3 % (ref 43–80)
PDW BLD-RTO: 15.1 FL (ref 11.5–15)
PHOSPHORUS: 3.6 MG/DL (ref 2.5–4.5)
PLATELET # BLD: 122 E9/L (ref 130–450)
PMV BLD AUTO: 11.3 FL (ref 7–12)
POTASSIUM SERPL-SCNC: 4.2 MMOL/L (ref 3.5–5)
PROTHROMBIN TIME: 14.4 SEC (ref 9.3–12.4)
RBC # BLD: 3.43 E12/L (ref 3.8–5.8)
SODIUM BLD-SCNC: 141 MMOL/L (ref 132–146)
TOTAL IRON BINDING CAPACITY: 215 MCG/DL (ref 250–450)
TOTAL PROTEIN: 5.3 G/DL (ref 6.4–8.3)
TRIGL SERPL-MCNC: 69 MG/DL (ref 0–149)
TROPONIN, HIGH SENSITIVITY: 26 NG/L (ref 0–11)
TROPONIN, HIGH SENSITIVITY: 29 NG/L (ref 0–11)
TROPONIN, HIGH SENSITIVITY: 30 NG/L (ref 0–11)
TROPONIN, HIGH SENSITIVITY: 31 NG/L (ref 0–11)
VITAMIN B-12: 548 PG/ML (ref 211–946)
VLDLC SERPL CALC-MCNC: 14 MG/DL
WBC # BLD: 8.2 E9/L (ref 4.5–11.5)

## 2022-04-03 PROCEDURE — 6370000000 HC RX 637 (ALT 250 FOR IP): Performed by: INTERNAL MEDICINE

## 2022-04-03 PROCEDURE — 36415 COLL VENOUS BLD VENIPUNCTURE: CPT

## 2022-04-03 PROCEDURE — 85610 PROTHROMBIN TIME: CPT

## 2022-04-03 PROCEDURE — 96366 THER/PROPH/DIAG IV INF ADDON: CPT

## 2022-04-03 PROCEDURE — 85025 COMPLETE CBC W/AUTO DIFF WBC: CPT

## 2022-04-03 PROCEDURE — 84484 ASSAY OF TROPONIN QUANT: CPT

## 2022-04-03 PROCEDURE — 83550 IRON BINDING TEST: CPT

## 2022-04-03 PROCEDURE — 6360000002 HC RX W HCPCS: Performed by: STUDENT IN AN ORGANIZED HEALTH CARE EDUCATION/TRAINING PROGRAM

## 2022-04-03 PROCEDURE — 2580000003 HC RX 258: Performed by: INTERNAL MEDICINE

## 2022-04-03 PROCEDURE — 2000000000 HC ICU R&B

## 2022-04-03 PROCEDURE — 82607 VITAMIN B-12: CPT

## 2022-04-03 PROCEDURE — 6370000000 HC RX 637 (ALT 250 FOR IP): Performed by: STUDENT IN AN ORGANIZED HEALTH CARE EDUCATION/TRAINING PROGRAM

## 2022-04-03 PROCEDURE — 96368 THER/DIAG CONCURRENT INF: CPT

## 2022-04-03 PROCEDURE — 2500000003 HC RX 250 WO HCPCS: Performed by: STUDENT IN AN ORGANIZED HEALTH CARE EDUCATION/TRAINING PROGRAM

## 2022-04-03 PROCEDURE — 82728 ASSAY OF FERRITIN: CPT

## 2022-04-03 PROCEDURE — 96372 THER/PROPH/DIAG INJ SC/IM: CPT

## 2022-04-03 PROCEDURE — 83540 ASSAY OF IRON: CPT

## 2022-04-03 PROCEDURE — 36620 INSERTION CATHETER ARTERY: CPT

## 2022-04-03 PROCEDURE — 84100 ASSAY OF PHOSPHORUS: CPT

## 2022-04-03 PROCEDURE — 83735 ASSAY OF MAGNESIUM: CPT

## 2022-04-03 PROCEDURE — 2580000003 HC RX 258: Performed by: STUDENT IN AN ORGANIZED HEALTH CARE EDUCATION/TRAINING PROGRAM

## 2022-04-03 PROCEDURE — 99291 CRITICAL CARE FIRST HOUR: CPT | Performed by: SURGERY

## 2022-04-03 PROCEDURE — 80061 LIPID PANEL: CPT

## 2022-04-03 PROCEDURE — 80053 COMPREHEN METABOLIC PANEL: CPT

## 2022-04-03 PROCEDURE — 2700000000 HC OXYGEN THERAPY PER DAY

## 2022-04-03 RX ADMIN — METOPROLOL TARTRATE 25 MG: 25 TABLET, FILM COATED ORAL at 11:17

## 2022-04-03 RX ADMIN — ESMOLOL HYDROCHLORIDE IN SODIUM CHLORIDE 150 MCG/KG/MIN: 10 INJECTION INTRAVENOUS at 01:28

## 2022-04-03 RX ADMIN — ESMOLOL HYDROCHLORIDE IN SODIUM CHLORIDE 150 MCG/KG/MIN: 10 INJECTION INTRAVENOUS at 09:06

## 2022-04-03 RX ADMIN — PANTOPRAZOLE SODIUM 40 MG: 40 TABLET, DELAYED RELEASE ORAL at 06:32

## 2022-04-03 RX ADMIN — ESMOLOL HYDROCHLORIDE IN SODIUM CHLORIDE 150 MCG/KG/MIN: 10 INJECTION INTRAVENOUS at 22:27

## 2022-04-03 RX ADMIN — ROSUVASTATIN CALCIUM 10 MG: 10 TABLET, FILM COATED ORAL at 21:24

## 2022-04-03 RX ADMIN — SODIUM CHLORIDE, PRESERVATIVE FREE 10 ML: 5 INJECTION INTRAVENOUS at 21:25

## 2022-04-03 RX ADMIN — MIRTAZAPINE 7.5 MG: 15 TABLET, FILM COATED ORAL at 21:24

## 2022-04-03 RX ADMIN — HEPARIN SODIUM 5000 UNITS: 10000 INJECTION, SOLUTION INTRAVENOUS; SUBCUTANEOUS at 06:32

## 2022-04-03 RX ADMIN — HEPARIN SODIUM 5000 UNITS: 10000 INJECTION, SOLUTION INTRAVENOUS; SUBCUTANEOUS at 21:30

## 2022-04-03 RX ADMIN — ESMOLOL HYDROCHLORIDE IN SODIUM CHLORIDE 50 MCG/KG/MIN: 10 INJECTION INTRAVENOUS at 17:55

## 2022-04-03 RX ADMIN — SODIUM CHLORIDE, PRESERVATIVE FREE 10 ML: 5 INJECTION INTRAVENOUS at 08:37

## 2022-04-03 RX ADMIN — SODIUM NITROPRUSSIDE 0.5 MCG/KG/MIN: 25 INJECTION, SOLUTION, CONCENTRATE INTRAVENOUS at 16:35

## 2022-04-03 RX ADMIN — HEPARIN SODIUM 5000 UNITS: 10000 INJECTION, SOLUTION INTRAVENOUS; SUBCUTANEOUS at 13:47

## 2022-04-03 RX ADMIN — FERROUS SULFATE TAB 325 MG (65 MG ELEMENTAL FE) 325 MG: 325 (65 FE) TAB at 08:38

## 2022-04-03 RX ADMIN — PAROXETINE 20 MG: 20 TABLET, FILM COATED ORAL at 08:38

## 2022-04-03 RX ADMIN — METOPROLOL TARTRATE 25 MG: 25 TABLET, FILM COATED ORAL at 19:11

## 2022-04-03 RX ADMIN — ESMOLOL HYDROCHLORIDE IN SODIUM CHLORIDE 150 MCG/KG/MIN: 10 INJECTION INTRAVENOUS at 05:18

## 2022-04-03 ASSESSMENT — PAIN SCALES - GENERAL
PAINLEVEL_OUTOF10: 0

## 2022-04-03 NOTE — PLAN OF CARE
Problem: Pain:  Goal: Pain level will decrease  Description: Pain level will decrease  4/2/2022 2103 by [facility]  Outcome: Met This Shift     Problem: Pain:  Goal: Control of acute pain  Description: Control of acute pain  4/2/2022 2103 by [facility]  Outcome: Met This Shift     Problem: Falls - Risk of:  Goal: Will remain free from falls  Description: Will remain free from falls  4/2/2022 2103 by [facility]  Outcome: Met This Shift     Problem: Falls - Risk of:  Goal: Absence of physical injury  Description: Absence of physical injury  Outcome: Met This Shift

## 2022-04-03 NOTE — PLAN OF CARE
Problem: Pain:  Goal: Pain level will decrease  Description: Pain level will decrease  4/3/2022 0823 by Rona Tracy RN  Outcome: Met This Shift  4/2/2022 2103 by Jerson Mir  Outcome: Met This Shift  Goal: Control of acute pain  Description: Control of acute pain  4/3/2022 0823 by Rona Tracy RN  Outcome: Met This Shift  4/2/2022 2103 by Jerson Mir  Outcome: Met This Shift  Goal: Control of chronic pain  Description: Control of chronic pain  Outcome: Met This Shift

## 2022-04-03 NOTE — PROGRESS NOTES
Vascular Surgery Progress Note    Pt is being seen in f/u today regarding Type B Aortic Dissection    Subjective  Pt s/e. He states that he has no pain this morning. Denies any nausea, vomiting, headaches, or changes in vision.     Current Medications:    esmolol 150 mcg/kg/min (04/03/22 0518)    nitroprusside (NIPRIDE) 50 mg in D5W infusion 0.5 mcg/kg/min (04/02/22 1903)    sodium chloride        acetaminophen, traMADol, morphine, ondansetron, sodium chloride flush, sodium chloride, ondansetron **OR** ondansetron, polyethylene glycol, acetaminophen **OR** acetaminophen, sodium chloride, labetalol, hydrALAZINE    sodium chloride flush  5-40 mL IntraVENous 2 times per day    ferrous sulfate  325 mg Oral Daily with breakfast    mirtazapine  7.5 mg Oral Nightly    PARoxetine  20 mg Oral Daily    pantoprazole  40 mg Oral Daily    rosuvastatin  10 mg Oral Nightly    heparin (porcine)  5,000 Units SubCUTAneous 3 times per day        PHYSICAL EXAM:    BP 98/60   Pulse 63   Temp 98 °F (36.7 °C) (Oral)   Resp 18   Ht 5' 8\" (1.727 m)   Wt 160 lb (72.6 kg)   SpO2 98%   BMI 24.33 kg/m²     Intake/Output Summary (Last 24 hours) at 4/3/2022 0601  Last data filed at 4/3/2022 0500  Gross per 24 hour   Intake 120 ml   Output 750 ml   Net -630 ml          Gen: awake, alert and oriented x3, no apparent distress  CVS: RR and normotensive  Resp: On 2L NC  Abd: Soft, non-tender, non-distended  RUE: +2 pulses throughout  LUE: +2 pulses throughout    LABS:    Lab Results   Component Value Date    WBC 13.5 (H) 04/02/2022    HGB 13.5 04/02/2022    HCT 41.1 04/02/2022     04/02/2022    PROTIME 12.3 04/02/2022    INR 1.1 04/02/2022    APTT 27.1 04/02/2022    K 3.2 (L) 04/02/2022    BUN 10 04/02/2022    CREATININE 0.9 04/02/2022       A/P  80 y.o. male with Type B Aortic Dissection    - Okay for diet from vascular POV  - Pain control PRN  - Maintain SBP<120 and HR<75  - Wean O2 as able  - Continue esmolol and nitroprusside gtt, wean as able  - Critical care management appreciated    Eleanor Saleh MD

## 2022-04-03 NOTE — PROGRESS NOTES
CC: Type B aortic dissection    ASSESSMENT:  Type B thoracic aortic dissection  Massive hiatal hernia  Atherosclerotic cardiovascular disease  Acute blood loss anemia  Incidental left inguinal hernia  History of recent Shigella infection    PLAN:  Keep systolic blood pressure less than 120 and heart rate less than 75  Start metoprolol  Wean nitroprusside and esmolol  Start diet  Monitor H&H  SCDs, hold chemoprophylaxis  DNR-CCA  Continue ICU level care      SIGNIFICANT 24 HOUR EVENTS/NEW COMPLAINTS:  4/3-admitted from ED yesterday with back pain. CT imaging demonstrated contained type B thoracic aortic dissection. Admitted to CVICU and started on intravenous antihypertensives      PHYSICAL EXAM:  General -pleasant calm 80-year-old white man  Neuro -awake. Alert. Attentive. Head/Face/Neck -no visible mass  CV -normal sinus rhythm with occasional PVCs. 2+ bilateral radial and femoral pulses  Pulm -nonlabored. 1 L nasal cannula    The patient is at significant risk for life-threatening hemodynamic deterioration and death and requires ongoing critical care management.       Critical care time exclusive of teaching and procedures = 36 minutes

## 2022-04-03 NOTE — PROGRESS NOTES
Hospitalist Progress Note      SYNOPSIS: Patient admitted on 2022 for       SUBJECTIVE:    Patient seen and examined. He states that he is not happy that he has to stay in a bed. His back feels sore. NO Chest pain, SOB or abd pain. He does feel nauseous. NO vomiting. Spoke with nursing, attempting to transition patient to PO meds. Temp (24hrs), Av.9 °F (36.6 °C), Min:97.6 °F (36.4 °C), Max:98.2 °F (36.8 °C)    DIET: ADULT DIET; Regular; Low Sodium (2 gm)  CODE: DNR-CCA    Intake/Output Summary (Last 24 hours) at 4/3/2022 1805  Last data filed at 4/3/2022 1400  Gross per 24 hour   Intake 2123.89 ml   Output 1150 ml   Net 973.89 ml       OBJECTIVE:    /69   Pulse 66   Temp 98.2 °F (36.8 °C) (Oral)   Resp 19   Ht 5' 8\" (1.727 m)   Wt 160 lb (72.6 kg)   SpO2 95%   BMI 24.33 kg/m²     General appearance: No apparent distress, appears stated age and cooperative. HEENT:  Conjunctivae/corneas clear. Neck: Supple. No jugular venous distention. Respiratory: Clear to auscultation bilaterally, normal respiratory effort  Cardiovascular: Regular rate rhythm, normal S1-S2  Abdomen: Soft, nontender, nondistended  Musculoskeletal: No clubbing, cyanosis, no bilateral lower extremity edema. Brisk capillary refill. Skin:  No rashes  on visible skin  Neurologic: awake, alert and following commands     ASSESSMENT:  80y.o. year old male  who  has a past medical history of Acute pancreatitis, Anxiety, BPH (benign prostatic hyperplasia), CAD (coronary artery disease), GERD (gastroesophageal reflux disease), Hyperlipidemia, Obsessive compulsive disorder, and Psoriasis. who presents to the hospital with complaints of back pain that radiated to his left ribs. CTA of the chest which showed: presence of a Yosvany B acute aortic syndrome with hematoma; elevated HTN of 207/110. PLAN:    1.) Aortic dissection with hematoma: Patient transferred to the SICU.  Vascular/ICU on consult- appreciate recommendations. C/w anti-hypertensive mgmt- attempting transfer to PO metoprolol. Analgesics PRN. Telemetry. 2.) Back/CP secondary to #1; C/w trending troponins. 6/3/21: EF: 50%   3.) HTN: C/w esmolol and nitroprusside started in the ED with transition to metoprolol. Patient's home dose of lisinopril was not ordered. 4.) GERD: Protonix. 5.) Hypokalemia: resolved. 6.)  Psoriasis: Patient follows with dermatology OP.   7.) Anemia: Monitor patient's Hb/HCT. Patient's HB had dropped- all anticoagulants have been D/c. 18% Iron Sat. Patient has been taking daily iron OP which has been ordered from admission. 8.) HLD: C/w crestor. 9.) Depression: C/w remeron and paxil. 10.) DVT proph:Scds ordered.      DISPOSITION: TBD    Medications:  REVIEWED DAILY      Infusion Medications    esmolol 50 mcg/kg/min (04/03/22 1755)    nitroprusside (NIPRIDE) 50 mg in D5W infusion 0.5 mcg/kg/min (04/03/22 1635)    sodium chloride       Scheduled Medications    metoprolol tartrate  25 mg Oral BID    sodium chloride flush  5-40 mL IntraVENous 2 times per day    ferrous sulfate  325 mg Oral Daily with breakfast    mirtazapine  7.5 mg Oral Nightly    PARoxetine  20 mg Oral Daily    pantoprazole  40 mg Oral Daily    rosuvastatin  10 mg Oral Nightly    heparin (porcine)  5,000 Units SubCUTAneous 3 times per day     PRN Meds: acetaminophen, traMADol, morphine, ondansetron, sodium chloride flush, sodium chloride, ondansetron **OR** ondansetron, polyethylene glycol, acetaminophen **OR** acetaminophen, sodium chloride, labetalol, hydrALAZINE        Labs:     Recent Labs     04/02/22  1527 04/03/22  0400   WBC 13.5* 8.2   HGB 13.5 10.6*   HCT 41.1 32.6*    122*       Recent Labs     04/02/22  1527 04/03/22  0400    141   K 3.2* 4.2    111*   CO2 22 22   BUN 10 10   CREATININE 0.9 0.9   CALCIUM 9.0 7.5*   PHOS  --  3.6       Recent Labs     04/02/22  1527 04/03/22  0400   PROT 7.7 5.3*   ALKPHOS 86 55   ALT 15 9   AST 26 21   BILITOT 0.9 0.8       Recent Labs     04/02/22  1527 04/03/22  0400   INR 1.1 1.3       No results for input(s): CKTOTAL, TROPONINI in the last 72 hours. Chronic labs:    Lab Results   Component Value Date    CHOL 116 04/03/2022    TRIG 69 04/03/2022    HDL 40 04/03/2022    LDLCALC 62 04/03/2022    PSA 1.88 10/21/2015    INR 1.3 04/03/2022       Radiology: REVIEWED DAILY    +++++++++++++++++++++++++++++++++++++++++++++++++  Larry Lazo MD  [facility] - [address] [place] [address]  +++++++++++++++++++++++++++++++++++++++++++++++++  NOTE: This report was transcribed using voice recognition software. Every effort was made to ensure accuracy; however, inadvertent computerized transcription errors may be present.

## 2022-04-04 ENCOUNTER — APPOINTMENT (OUTPATIENT)
Dept: GENERAL RADIOLOGY | Age: 87
DRG: 300 | End: 2022-04-04
Payer: MEDICARE

## 2022-04-04 LAB
ALBUMIN SERPL-MCNC: 2.9 G/DL (ref 3.5–5.2)
ALP BLD-CCNC: 60 U/L (ref 40–129)
ALT SERPL-CCNC: 8 U/L (ref 0–40)
ANION GAP SERPL CALCULATED.3IONS-SCNC: 5 MMOL/L (ref 7–16)
AST SERPL-CCNC: 14 U/L (ref 0–39)
BASOPHILS ABSOLUTE: 0.04 E9/L (ref 0–0.2)
BASOPHILS RELATIVE PERCENT: 0.4 % (ref 0–2)
BILIRUB SERPL-MCNC: 1.2 MG/DL (ref 0–1.2)
BUN BLDV-MCNC: 10 MG/DL (ref 6–23)
CALCIUM IONIZED: 1.22 MMOL/L (ref 1.15–1.33)
CALCIUM SERPL-MCNC: 7.9 MG/DL (ref 8.6–10.2)
CHLORIDE BLD-SCNC: 105 MMOL/L (ref 98–107)
CO2: 25 MMOL/L (ref 22–29)
CREAT SERPL-MCNC: 1 MG/DL (ref 0.7–1.2)
EKG ATRIAL RATE: 93 BPM
EKG P AXIS: 63 DEGREES
EKG P-R INTERVAL: 256 MS
EKG Q-T INTERVAL: 370 MS
EKG QRS DURATION: 96 MS
EKG QTC CALCULATION (BAZETT): 460 MS
EKG R AXIS: -14 DEGREES
EKG T AXIS: 39 DEGREES
EKG VENTRICULAR RATE: 93 BPM
EOSINOPHILS ABSOLUTE: 0.31 E9/L (ref 0.05–0.5)
EOSINOPHILS RELATIVE PERCENT: 3.4 % (ref 0–6)
GFR AFRICAN AMERICAN: >60
GFR NON-AFRICAN AMERICAN: >60 ML/MIN/1.73
GLUCOSE BLD-MCNC: 114 MG/DL (ref 74–99)
HCT VFR BLD CALC: 31.1 % (ref 37–54)
HEMOGLOBIN: 10.2 G/DL (ref 12.5–16.5)
IMMATURE GRANULOCYTES #: 0.02 E9/L
IMMATURE GRANULOCYTES %: 0.2 % (ref 0–5)
LYMPHOCYTES ABSOLUTE: 2.05 E9/L (ref 1.5–4)
LYMPHOCYTES RELATIVE PERCENT: 22.5 % (ref 20–42)
MAGNESIUM: 1.9 MG/DL (ref 1.6–2.6)
MCH RBC QN AUTO: 31 PG (ref 26–35)
MCHC RBC AUTO-ENTMCNC: 32.8 % (ref 32–34.5)
MCV RBC AUTO: 94.5 FL (ref 80–99.9)
MONOCYTES ABSOLUTE: 1.44 E9/L (ref 0.1–0.95)
MONOCYTES RELATIVE PERCENT: 15.8 % (ref 2–12)
NEUTROPHILS ABSOLUTE: 5.27 E9/L (ref 1.8–7.3)
NEUTROPHILS RELATIVE PERCENT: 57.7 % (ref 43–80)
PDW BLD-RTO: 15 FL (ref 11.5–15)
PHOSPHORUS: 2.2 MG/DL (ref 2.5–4.5)
PLATELET # BLD: 118 E9/L (ref 130–450)
PMV BLD AUTO: 11.3 FL (ref 7–12)
POTASSIUM SERPL-SCNC: 3.7 MMOL/L (ref 3.5–5)
RBC # BLD: 3.29 E12/L (ref 3.8–5.8)
SODIUM BLD-SCNC: 135 MMOL/L (ref 132–146)
TOTAL PROTEIN: 5.5 G/DL (ref 6.4–8.3)
TROPONIN, HIGH SENSITIVITY: 23 NG/L (ref 0–11)
TROPONIN, HIGH SENSITIVITY: 24 NG/L (ref 0–11)
TROPONIN, HIGH SENSITIVITY: 29 NG/L (ref 0–11)
WBC # BLD: 9.1 E9/L (ref 4.5–11.5)

## 2022-04-04 PROCEDURE — 96372 THER/PROPH/DIAG INJ SC/IM: CPT

## 2022-04-04 PROCEDURE — 82330 ASSAY OF CALCIUM: CPT

## 2022-04-04 PROCEDURE — 6370000000 HC RX 637 (ALT 250 FOR IP): Performed by: INTERNAL MEDICINE

## 2022-04-04 PROCEDURE — 6360000002 HC RX W HCPCS: Performed by: INTERNAL MEDICINE

## 2022-04-04 PROCEDURE — 2000000000 HC ICU R&B

## 2022-04-04 PROCEDURE — 85025 COMPLETE CBC W/AUTO DIFF WBC: CPT

## 2022-04-04 PROCEDURE — 37799 UNLISTED PX VASCULAR SURGERY: CPT

## 2022-04-04 PROCEDURE — 2700000000 HC OXYGEN THERAPY PER DAY

## 2022-04-04 PROCEDURE — 2500000003 HC RX 250 WO HCPCS: Performed by: STUDENT IN AN ORGANIZED HEALTH CARE EDUCATION/TRAINING PROGRAM

## 2022-04-04 PROCEDURE — 84100 ASSAY OF PHOSPHORUS: CPT

## 2022-04-04 PROCEDURE — 6360000002 HC RX W HCPCS: Performed by: STUDENT IN AN ORGANIZED HEALTH CARE EDUCATION/TRAINING PROGRAM

## 2022-04-04 PROCEDURE — 71045 X-RAY EXAM CHEST 1 VIEW: CPT

## 2022-04-04 PROCEDURE — 96366 THER/PROPH/DIAG IV INF ADDON: CPT

## 2022-04-04 PROCEDURE — 36415 COLL VENOUS BLD VENIPUNCTURE: CPT

## 2022-04-04 PROCEDURE — 2580000003 HC RX 258: Performed by: INTERNAL MEDICINE

## 2022-04-04 PROCEDURE — 96375 TX/PRO/DX INJ NEW DRUG ADDON: CPT

## 2022-04-04 PROCEDURE — 2580000003 HC RX 258: Performed by: STUDENT IN AN ORGANIZED HEALTH CARE EDUCATION/TRAINING PROGRAM

## 2022-04-04 PROCEDURE — 83735 ASSAY OF MAGNESIUM: CPT

## 2022-04-04 PROCEDURE — 6370000000 HC RX 637 (ALT 250 FOR IP): Performed by: STUDENT IN AN ORGANIZED HEALTH CARE EDUCATION/TRAINING PROGRAM

## 2022-04-04 PROCEDURE — 99291 CRITICAL CARE FIRST HOUR: CPT | Performed by: SURGERY

## 2022-04-04 PROCEDURE — 96368 THER/DIAG CONCURRENT INF: CPT

## 2022-04-04 PROCEDURE — 99232 SBSQ HOSP IP/OBS MODERATE 35: CPT

## 2022-04-04 PROCEDURE — 80053 COMPREHEN METABOLIC PANEL: CPT

## 2022-04-04 PROCEDURE — 84484 ASSAY OF TROPONIN QUANT: CPT

## 2022-04-04 RX ORDER — DOCUSATE SODIUM 100 MG/1
100 CAPSULE, LIQUID FILLED ORAL DAILY
Status: DISCONTINUED | OUTPATIENT
Start: 2022-04-04 | End: 2022-04-04

## 2022-04-04 RX ORDER — METOPROLOL TARTRATE 50 MG/1
100 TABLET, FILM COATED ORAL 2 TIMES DAILY
Status: DISCONTINUED | OUTPATIENT
Start: 2022-04-04 | End: 2022-04-05

## 2022-04-04 RX ORDER — DOCUSATE SODIUM 100 MG/1
100 CAPSULE, LIQUID FILLED ORAL 2 TIMES DAILY
Status: DISCONTINUED | OUTPATIENT
Start: 2022-04-04 | End: 2022-04-09 | Stop reason: HOSPADM

## 2022-04-04 RX ADMIN — POTASSIUM PHOSPHATE, MONOBASIC AND POTASSIUM PHOSPHATE, DIBASIC 15 MMOL: 224; 236 INJECTION, SOLUTION, CONCENTRATE INTRAVENOUS at 07:55

## 2022-04-04 RX ADMIN — PAROXETINE 20 MG: 20 TABLET, FILM COATED ORAL at 08:00

## 2022-04-04 RX ADMIN — HEPARIN SODIUM 5000 UNITS: 10000 INJECTION, SOLUTION INTRAVENOUS; SUBCUTANEOUS at 13:16

## 2022-04-04 RX ADMIN — SODIUM NITROPRUSSIDE 1 MCG/KG/MIN: 25 INJECTION, SOLUTION, CONCENTRATE INTRAVENOUS at 23:05

## 2022-04-04 RX ADMIN — DOCUSATE SODIUM 100 MG: 100 CAPSULE, LIQUID FILLED ORAL at 22:03

## 2022-04-04 RX ADMIN — MIRTAZAPINE 7.5 MG: 15 TABLET, FILM COATED ORAL at 22:00

## 2022-04-04 RX ADMIN — ESMOLOL HYDROCHLORIDE IN SODIUM CHLORIDE 150 MCG/KG/MIN: 10 INJECTION INTRAVENOUS at 06:18

## 2022-04-04 RX ADMIN — ROSUVASTATIN CALCIUM 10 MG: 10 TABLET, FILM COATED ORAL at 22:00

## 2022-04-04 RX ADMIN — ESMOLOL HYDROCHLORIDE IN SODIUM CHLORIDE 150 MCG/KG/MIN: 10 INJECTION INTRAVENOUS at 14:13

## 2022-04-04 RX ADMIN — METOPROLOL TARTRATE 100 MG: 50 TABLET, FILM COATED ORAL at 22:00

## 2022-04-04 RX ADMIN — ESMOLOL HYDROCHLORIDE IN SODIUM CHLORIDE 150 MCG/KG/MIN: 10 INJECTION INTRAVENOUS at 18:09

## 2022-04-04 RX ADMIN — ONDANSETRON 4 MG: 2 INJECTION INTRAMUSCULAR; INTRAVENOUS at 06:29

## 2022-04-04 RX ADMIN — HEPARIN SODIUM 5000 UNITS: 10000 INJECTION, SOLUTION INTRAVENOUS; SUBCUTANEOUS at 22:03

## 2022-04-04 RX ADMIN — SODIUM CHLORIDE, PRESERVATIVE FREE 10 ML: 5 INJECTION INTRAVENOUS at 22:01

## 2022-04-04 RX ADMIN — METOPROLOL TARTRATE 75 MG: 50 TABLET, FILM COATED ORAL at 13:26

## 2022-04-04 RX ADMIN — HEPARIN SODIUM 5000 UNITS: 10000 INJECTION, SOLUTION INTRAVENOUS; SUBCUTANEOUS at 06:04

## 2022-04-04 RX ADMIN — PANTOPRAZOLE SODIUM 40 MG: 40 TABLET, DELAYED RELEASE ORAL at 06:04

## 2022-04-04 RX ADMIN — SODIUM NITROPRUSSIDE 2 MCG/KG/MIN: 25 INJECTION, SOLUTION, CONCENTRATE INTRAVENOUS at 04:01

## 2022-04-04 RX ADMIN — TRAMADOL HYDROCHLORIDE 25 MG: 50 TABLET, COATED ORAL at 08:26

## 2022-04-04 RX ADMIN — ESMOLOL HYDROCHLORIDE IN SODIUM CHLORIDE 150 MCG/KG/MIN: 10 INJECTION INTRAVENOUS at 02:21

## 2022-04-04 RX ADMIN — ESMOLOL HYDROCHLORIDE IN SODIUM CHLORIDE 150 MCG/KG/MIN: 10 INJECTION INTRAVENOUS at 22:00

## 2022-04-04 RX ADMIN — ESMOLOL HYDROCHLORIDE IN SODIUM CHLORIDE 150 MCG/KG/MIN: 10 INJECTION INTRAVENOUS at 10:14

## 2022-04-04 RX ADMIN — SODIUM NITROPRUSSIDE 1.25 MCG/KG/MIN: 25 INJECTION, SOLUTION, CONCENTRATE INTRAVENOUS at 11:20

## 2022-04-04 RX ADMIN — FERROUS SULFATE TAB 325 MG (65 MG ELEMENTAL FE) 325 MG: 325 (65 FE) TAB at 07:59

## 2022-04-04 RX ADMIN — SODIUM CHLORIDE, PRESERVATIVE FREE 10 ML: 5 INJECTION INTRAVENOUS at 08:00

## 2022-04-04 RX ADMIN — METOPROLOL TARTRATE 25 MG: 25 TABLET, FILM COATED ORAL at 08:00

## 2022-04-04 ASSESSMENT — PAIN DESCRIPTION - DESCRIPTORS: DESCRIPTORS: ACHING;DISCOMFORT

## 2022-04-04 ASSESSMENT — PAIN DESCRIPTION - FREQUENCY: FREQUENCY: INTERMITTENT

## 2022-04-04 ASSESSMENT — PAIN SCALES - GENERAL
PAINLEVEL_OUTOF10: 0
PAINLEVEL_OUTOF10: 8
PAINLEVEL_OUTOF10: 0
PAINLEVEL_OUTOF10: 0

## 2022-04-04 ASSESSMENT — PAIN DESCRIPTION - PAIN TYPE: TYPE: ACUTE PAIN

## 2022-04-04 ASSESSMENT — PAIN DESCRIPTION - ORIENTATION: ORIENTATION: MID;LEFT

## 2022-04-04 ASSESSMENT — PAIN DESCRIPTION - LOCATION: LOCATION: BACK

## 2022-04-04 NOTE — PROGRESS NOTES
Vascular Surgery Progress Note    Pt is being seen in f/u today regarding Type B aortic dissection    Subjective  Pt s/e. Denies pain  Denies CP, SOB  Esmolol and nipride gtts continue  Feels good.   Wants to get up and move around    Current Medications:    esmolol 150 mcg/kg/min (04/04/22 1014)    nitroprusside (NIPRIDE) 50 mg in D5W infusion 1 mcg/kg/min (04/04/22 1143)    sodium chloride        acetaminophen, traMADol, morphine, ondansetron, sodium chloride flush, sodium chloride, ondansetron **OR** ondansetron, polyethylene glycol, acetaminophen **OR** acetaminophen, sodium chloride, labetalol, hydrALAZINE    potassium phosphate IVPB  15 mmol IntraVENous Once    metoprolol tartrate  25 mg Oral BID    sodium chloride flush  5-40 mL IntraVENous 2 times per day    ferrous sulfate  325 mg Oral Daily with breakfast    mirtazapine  7.5 mg Oral Nightly    PARoxetine  20 mg Oral Daily    pantoprazole  40 mg Oral Daily    rosuvastatin  10 mg Oral Nightly    heparin (porcine)  5,000 Units SubCUTAneous 3 times per day      PHYSICAL EXAM:    /80   Pulse 73   Temp 98.4 °F (36.9 °C) (Oral)   Resp 19   Ht 5' 8\" (1.727 m)   Wt 169 lb 8 oz (76.9 kg)   SpO2 95%   BMI 25.77 kg/m²     Intake/Output Summary (Last 24 hours) at 4/4/2022 1152  Last data filed at 4/4/2022 1143  Gross per 24 hour   Intake 2219.51 ml   Output 1250 ml   Net 969.51 ml        Gen: awake, alert, oriented x3, in no apparent distress  CVS: S1, S2  Resp: No increased work of breathing  Abd: soft, non tender, distended  R LE: Femoral, DP/PT +2   L LE: Femoral, DP/PT +2    LABS:    Lab Results   Component Value Date    WBC 9.1 04/04/2022    HGB 10.2 (L) 04/04/2022    HCT 31.1 (L) 04/04/2022     (L) 04/04/2022    PROTIME 14.4 (H) 04/03/2022    INR 1.3 04/03/2022    APTT 27.1 04/02/2022    K 3.7 04/04/2022    BUN 10 04/04/2022    CREATININE 1.0 04/04/2022     A/P Type B descending thoracic aortic dissection  · Continue drips to maintain SBP<120  · Wean as able and transition to PO  · Pain control - denies pain  · DNR-CCA  · Neurovascular exams  · Appreciate critical care management  · No vascular surgery interventions planned at this time    ORVILLE Barroso - CNP

## 2022-04-04 NOTE — PROGRESS NOTES
Vascular Surgery Progress Note    Pt is being seen in f/u today regarding Type B Aortic Dissection    Subjective  No acute events overnight. Remains on antihypertensive infusions reaching goal. Some PVCs overnight per nursing. No N/V. No changes in neurovascular examination overnight.     Current Medications:    esmolol 150 mcg/kg/min (04/04/22 0618)    nitroprusside (NIPRIDE) 50 mg in D5W infusion 2 mcg/kg/min (04/04/22 0401)    sodium chloride        acetaminophen, traMADol, morphine, ondansetron, sodium chloride flush, sodium chloride, ondansetron **OR** ondansetron, polyethylene glycol, acetaminophen **OR** acetaminophen, sodium chloride, labetalol, hydrALAZINE    metoprolol tartrate  25 mg Oral BID    sodium chloride flush  5-40 mL IntraVENous 2 times per day    ferrous sulfate  325 mg Oral Daily with breakfast    mirtazapine  7.5 mg Oral Nightly    PARoxetine  20 mg Oral Daily    pantoprazole  40 mg Oral Daily    rosuvastatin  10 mg Oral Nightly    heparin (porcine)  5,000 Units SubCUTAneous 3 times per day        PHYSICAL EXAM:    BP (!) 106/56   Pulse 70   Temp 98.4 °F (36.9 °C) (Oral)   Resp 20   Ht 5' 8\" (1.727 m)   Wt 169 lb 8 oz (76.9 kg)   SpO2 96%   BMI 25.77 kg/m²     Intake/Output Summary (Last 24 hours) at 4/4/2022 0640  Last data filed at 4/4/2022 0300  Gross per 24 hour   Intake 1046.58 ml   Output 1000 ml   Net 46.58 ml          Gen: no apparent distress, responds to questions  CVS: rate in the 49-25C, bp with systolic <418 on arterial line  Resp: symmetric chest rise, no audible wheezing  Abd: Soft, non-tender, non-distended  RUE: +2 femoral, DP, PT pulses, motor 5/5 throughout and sensation present to light touch  LUE+2 femoral, DP, PT pulses;  motor 5/5 throughout and sensation present to light touch    LABS:    Lab Results   Component Value Date    WBC 9.1 04/04/2022    HGB 10.2 (L) 04/04/2022    HCT 31.1 (L) 04/04/2022     (L) 04/04/2022    PROTIME 14.4 (H) 04/03/2022 INR 1.3 04/03/2022    APTT 27.1 04/02/2022    K 3.7 04/04/2022    BUN 10 04/04/2022    CREATININE 1.0 04/04/2022       A/P  80 y.o. male with Type B Aortic Dissection.  Doing well with medical management.     - Okay for diet from vascular POV  - Pain control PRN  - Maintain SBP<120 and HR<75  - transition from IV antihypertensives to PO    Clara Porras MD

## 2022-04-04 NOTE — PROGRESS NOTES
CC: Type B aortic dissection    SIGNIFICANT 24 HOUR EVENTS/NEW COMPLAINTS:  4/3-admitted from ED yesterday with back pain. CT imaging demonstrated contained type B thoracic aortic dissection. Admitted to CVICU and started on intravenous antihypertensives  4/4 remains on esmolol drip at 150 mics per kilo per minute and nitroprusside at 1.5 mics per kilo per minute    PHYSICAL EXAM:  General -pleasant calm 80-year-old white man  Neuro -awake and alert x3  Head/Face/Neck -no visible mass  CV -normal sinus rhythm with occasional PVCs. 2+ bilateral radial and femoral pulses  Pulm -nonlabored. Abdomen soft nontender nondistended        ASSESSMENT:  Type B thoracic aortic dissection  Massive hiatal hernia  Atherosclerotic cardiovascular disease  Acute blood loss anemia  Incidental left inguinal hernia  History of recent Shigella infection    PLAN:  Keep systolic blood pressure less than 120 and heart rate less than 60  Patient needs maximal impulse reduction therapy  Currently on esmolol drip at 150 mics per kilo per minute and nitroprusside 1.5 mics per kilo per minute  We will wean esmolol drip. We will start Lopressor 100 mg twice daily, 75 mg first dose now  General diet  Renal-creatinine stable at 1.0. Continue to monitor urine output and renal function    Monitor H&H  SCDs, heparin 3 times daily  DNR-CCA  Continue ICU level care        The patient is at significant risk for life-threatening hemodynamic deterioration and death from his aortic dissection and requires ongoing critical care management. Because of his aortic dissection, he needs frequent hemodynamic monitoring    Critical care time exclusive of teaching and procedures = 35minutes              Sisi Sharif MD, FACS  4/4/2022  1:27 PM    NOTE: This report was transcribed using voice recognition software. Every effort was made to ensure accuracy; however, inadvertent computerized transcription errors may be present.

## 2022-04-04 NOTE — PROGRESS NOTES
Surgical Intensive Care Unit   Daily Progress Note     Date of admission: 4/2/2022    Reason for ICU: Type B aortic dissection    Pertinent Hospital Course Events:   4/3: Presented to ER for chest pain through to back, found to have type B dissection. Started on nipride and esmolol. Vascular surgery consulted. 4/4: Remains on esmolol and nitroprusside. Overnight Events: No issues overnight    Subjective: No complaints on exam. Asking for blueberry muffin, coffee and apple juice. Physical Exam:   BP (!) 106/56   Pulse 70   Temp 98.4 °F (36.9 °C) (Oral)   Resp 20   Ht 5' 8\" (1.727 m)   Wt 169 lb 8 oz (76.9 kg)   SpO2 96%   BMI 25.77 kg/m²     I/O last 3 completed shifts: In: 3523.9 [P.O.:600; I.V.:2923.9]  Out: 1750 [Urine:1750]  No intake/output data recorded. General: No apparent distress, comfortable  HEENT: Trachea midline, no masses, Pupils equal round  Chest: Respiratory effort was normal with no retractions or use of accessory muscles. Cardiovascular: Heart sounds were normal with a regular rate  Abdomen:  Soft and non distended. No tenderness, guarding, rebound, or rigidity  Extremities: Moves all 4 extremeties, No pedal edema. 2+ peripheral pulses bilaterally. Assessment/Plan:     Neuro: GCS 15, pain control  CV: bradycardic rate, no acute issues- maintain BP <120, HR <60. On nipride and esmolol, transition to PO meds- 100 metoprolol BID  Pulm: tolerating room air, no acute issues, encourage IS  GI: regular diet, bowel regimen  Renal: Creatinine within normal limits, no edema indicating fluid overload, UOP adequate  ID: afebrile  Endo: glucose near normal range, no acute issues  MSK: no acute issues  Heme: no acute issues, heparin      Code status:  DNR-CCA    Disposition:  Continue ICU monitoring, transition to PO meds.      Electronically signed by Anitha Potter MD on 4/4/2022 at 7:17 AM

## 2022-04-04 NOTE — PLAN OF CARE
Problem: Pain:  Goal: Pain level will decrease  Description: Pain level will decrease  4/3/2022 2211 by Venu Rios RN  Outcome: Met This Shift     Problem: Pain:  Goal: Control of acute pain  Description: Control of acute pain  4/3/2022 2211 by Venu Rios RN  Outcome: Met This Shift     Problem: Pain:  Goal: Control of chronic pain  Description: Control of chronic pain  4/3/2022 2211 by Venu Rios RN  Outcome: Met This Shift     Problem: Falls - Risk of:  Goal: Will remain free from falls  Description: Will remain free from falls  4/3/2022 2211 by Venu Rios RN  Outcome: Met This Shift     Problem: Falls - Risk of:  Goal: Absence of physical injury  Description: Absence of physical injury  4/3/2022 2211 by Venu Rios RN  Outcome: Met This Shift     Problem: Cardiac:  Goal: Ability to maintain an adequate cardiac output will improve  Description: Ability to maintain an adequate cardiac output will improve  4/3/2022 2211 by Venu Rios RN  Outcome: Met This Shift     Problem: Fluid Volume:  Goal: Ability to achieve and maintain adequate urine output will improve  Description: Ability to achieve and maintain adequate urine output will improve  4/3/2022 2211 by Venu Rios RN  Outcome: Met This Shift     Problem: Respiratory:  Goal: Respiratory status will improve  Description: Respiratory status will improve  4/3/2022 2211 by Venu Rios RN  Outcome: Met This Shift     Problem: Skin Integrity:  Goal: Will show no infection signs and symptoms  Description: Will show no infection signs and symptoms  4/3/2022 2211 by Venu Rios RN  Outcome: Met This Shift     Problem: Cardiac:  Goal: Hemodynamic stability will improve  Description: Hemodynamic stability will improve  4/3/2022 2211 by Venu Rios RN  Outcome: Ongoing     Problem: Skin Integrity:  Goal: Absence of new skin breakdown  Description: Absence of new skin breakdown  4/3/2022 2211 by Venu Rios RN  Outcome: Not Met This Shift

## 2022-04-04 NOTE — PROCEDURES
Shirlene Dunlap is a 80 y.o. male patient. 1. 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[address]    2. Hiatal hernia      Past Medical History:   Diagnosis Date    Acute pancreatitis     6/06 AND 11/09    Anxiety     BPH (benign prostatic hyperplasia)     CAD (coronary artery disease)     GERD (gastroesophageal reflux disease)     Hyperlipidemia     Obsessive compulsive disorder     Psoriasis      Blood pressure 133/75, pulse 66, temperature 98.1 °F (36.7 °C), temperature source Oral, resp. rate 21, height 5' 8\" (1.727 m), weight 160 lb (72.6 kg), SpO2 96 %. Insert Arterial Line    Date/Time: 4/3/2022 8:39 PM  Performed by: Joshua Graham MD  Authorized by: Joshua Graham MD   Consent: Written consent obtained. Risks and benefits: risks, benefits and alternatives were discussed  Consent given by: patient  Required items: required blood products, implants, devices, and special equipment available  Patient identity confirmed: provided demographic data  Time out: Immediately prior to procedure a \"time out\" was called to verify the correct patient, procedure, equipment, support staff and site/side marked as required. Preparation: Patient was prepped and draped in the usual sterile fashion.   Indications: hemodynamic monitoring  Location: left radial  Anesthesia: local infiltration    Anesthesia:  Local Anesthetic: lidocaine 1% with epinephrine  Anesthetic total: 3 mL    Sedation:  Patient sedated: no    Needle gauge: 18  Seldinger technique: Seldinger technique used  Number of attempts: 1  Post-procedure: line sutured and dressing applied  Patient tolerance: patient tolerated the procedure well with no immediate complications          Jacqueline Sanchez MD  4/3/2022

## 2022-04-04 NOTE — CARE COORDINATION
Admitted with type b aortic dissection. Met with pt at bedside. He is currently on Iv nipride ans esmolol drips for bp control. He lives alone in a 2 story home with b&b on 2nd floor, 1/2 bath on 1st floor. He uses a st cane to ambulate, still drives. Contacts are his niece Shyam Moe and nephew Milburn Phalen. Pt states that he was recently at [facility] for rehab after being hospitalized at [facility]. He discharged home with [facility]. Spoke with [facility] liaison. Pt was accepted ,but services hadn't started yet. It snf needed at [facility], he would like to return to [facility]. Referral made to Mingo Stoddard at [facility]. If able to go home, will need [facility] orders for PT/OT and skilled nursing. Pt reports that his insurance changed to [employer] as of 4/1/22. Email sent to pt access to verify. 15:30:   Spoke to Pt's niece Mingo Stoddard.  She is hoping he will be able to return home with c, but if xavier  Needed additional choices are [address] [place] [address], [facility], [place] [facility]

## 2022-04-05 LAB
ALBUMIN SERPL-MCNC: 3 G/DL (ref 3.5–5.2)
ALP BLD-CCNC: 60 U/L (ref 40–129)
ALT SERPL-CCNC: 8 U/L (ref 0–40)
ANION GAP SERPL CALCULATED.3IONS-SCNC: 8 MMOL/L (ref 7–16)
ANISOCYTOSIS: ABNORMAL
AST SERPL-CCNC: 14 U/L (ref 0–39)
B.E.: -0.6 MMOL/L (ref -3–3)
BASOPHILS ABSOLUTE: 0 E9/L (ref 0–0.2)
BASOPHILS RELATIVE PERCENT: 0 % (ref 0–2)
BILIRUB SERPL-MCNC: 1.2 MG/DL (ref 0–1.2)
BUN BLDV-MCNC: 9 MG/DL (ref 6–23)
CALCIUM IONIZED: 1.25 MMOL/L (ref 1.15–1.33)
CALCIUM SERPL-MCNC: 8.2 MG/DL (ref 8.6–10.2)
CHLORIDE BLD-SCNC: 104 MMOL/L (ref 98–107)
CO2: 24 MMOL/L (ref 22–29)
COHB: 0.7 % (ref 0–1.5)
CREAT SERPL-MCNC: 0.8 MG/DL (ref 0.7–1.2)
CRITICAL: NORMAL
DATE ANALYZED: NORMAL
DATE OF COLLECTION: NORMAL
EOSINOPHILS ABSOLUTE: 0 E9/L (ref 0.05–0.5)
EOSINOPHILS RELATIVE PERCENT: 0 % (ref 0–6)
GFR AFRICAN AMERICAN: >60
GFR NON-AFRICAN AMERICAN: >60 ML/MIN/1.73
GLUCOSE BLD-MCNC: 97 MG/DL (ref 74–99)
HCO3: 23.6 MMOL/L (ref 22–26)
HCT VFR BLD CALC: 33.8 % (ref 37–54)
HEMOGLOBIN: 11 G/DL (ref 12.5–16.5)
HHB: 4 % (ref 0–5)
LAB: NORMAL
LACTIC ACID: 0.6 MMOL/L (ref 0.5–2.2)
LYMPHOCYTES ABSOLUTE: 1.19 E9/L (ref 1.5–4)
LYMPHOCYTES RELATIVE PERCENT: 10 % (ref 20–42)
Lab: NORMAL
MAGNESIUM: 2 MG/DL (ref 1.6–2.6)
MCH RBC QN AUTO: 31.2 PG (ref 26–35)
MCHC RBC AUTO-ENTMCNC: 32.5 % (ref 32–34.5)
MCV RBC AUTO: 95.8 FL (ref 80–99.9)
METHB: 0.4 % (ref 0–1.5)
MODE: NORMAL
MONOCYTES ABSOLUTE: 2.5 E9/L (ref 0.1–0.95)
MONOCYTES RELATIVE PERCENT: 21 % (ref 2–12)
NEUTROPHILS ABSOLUTE: 8.21 E9/L (ref 1.8–7.3)
NEUTROPHILS RELATIVE PERCENT: 69 % (ref 43–80)
O2 SATURATION: 96 % (ref 92–98.5)
O2HB: 94.9 % (ref 94–97)
OPERATOR ID: 914
OVALOCYTES: ABNORMAL
PATIENT TEMP: 37 C
PCO2: 37.4 MMHG (ref 35–45)
PDW BLD-RTO: 15.1 FL (ref 11.5–15)
PH BLOOD GAS: 7.42 (ref 7.35–7.45)
PHOSPHORUS: 2.1 MG/DL (ref 2.5–4.5)
PLATELET # BLD: 129 E9/L (ref 130–450)
PMV BLD AUTO: 11.1 FL (ref 7–12)
PO2: 83.6 MMHG (ref 75–100)
POIKILOCYTES: ABNORMAL
POLYCHROMASIA: ABNORMAL
POTASSIUM SERPL-SCNC: 3.6 MMOL/L (ref 3.5–5)
RBC # BLD: 3.53 E12/L (ref 3.8–5.8)
SODIUM BLD-SCNC: 136 MMOL/L (ref 132–146)
SOURCE, BLOOD GAS: NORMAL
THB: 11.7 G/DL (ref 11.5–16.5)
TIME ANALYZED: 551
TOTAL PROTEIN: 6.1 G/DL (ref 6.4–8.3)
WBC # BLD: 11.9 E9/L (ref 4.5–11.5)

## 2022-04-05 PROCEDURE — 82805 BLOOD GASES W/O2 SATURATION: CPT

## 2022-04-05 PROCEDURE — 96372 THER/PROPH/DIAG INJ SC/IM: CPT

## 2022-04-05 PROCEDURE — 2580000003 HC RX 258: Performed by: STUDENT IN AN ORGANIZED HEALTH CARE EDUCATION/TRAINING PROGRAM

## 2022-04-05 PROCEDURE — 99232 SBSQ HOSP IP/OBS MODERATE 35: CPT

## 2022-04-05 PROCEDURE — 6370000000 HC RX 637 (ALT 250 FOR IP): Performed by: STUDENT IN AN ORGANIZED HEALTH CARE EDUCATION/TRAINING PROGRAM

## 2022-04-05 PROCEDURE — 2580000003 HC RX 258: Performed by: INTERNAL MEDICINE

## 2022-04-05 PROCEDURE — 36415 COLL VENOUS BLD VENIPUNCTURE: CPT

## 2022-04-05 PROCEDURE — 2500000003 HC RX 250 WO HCPCS: Performed by: STUDENT IN AN ORGANIZED HEALTH CARE EDUCATION/TRAINING PROGRAM

## 2022-04-05 PROCEDURE — 80053 COMPREHEN METABOLIC PANEL: CPT

## 2022-04-05 PROCEDURE — 85025 COMPLETE CBC W/AUTO DIFF WBC: CPT

## 2022-04-05 PROCEDURE — 2700000000 HC OXYGEN THERAPY PER DAY

## 2022-04-05 PROCEDURE — 96368 THER/DIAG CONCURRENT INF: CPT

## 2022-04-05 PROCEDURE — 83605 ASSAY OF LACTIC ACID: CPT

## 2022-04-05 PROCEDURE — 6360000002 HC RX W HCPCS: Performed by: STUDENT IN AN ORGANIZED HEALTH CARE EDUCATION/TRAINING PROGRAM

## 2022-04-05 PROCEDURE — 83735 ASSAY OF MAGNESIUM: CPT

## 2022-04-05 PROCEDURE — 6370000000 HC RX 637 (ALT 250 FOR IP): Performed by: INTERNAL MEDICINE

## 2022-04-05 PROCEDURE — 82330 ASSAY OF CALCIUM: CPT

## 2022-04-05 PROCEDURE — 37799 UNLISTED PX VASCULAR SURGERY: CPT

## 2022-04-05 PROCEDURE — 99291 CRITICAL CARE FIRST HOUR: CPT | Performed by: SURGERY

## 2022-04-05 PROCEDURE — 84100 ASSAY OF PHOSPHORUS: CPT

## 2022-04-05 PROCEDURE — 96366 THER/PROPH/DIAG IV INF ADDON: CPT

## 2022-04-05 PROCEDURE — 2000000000 HC ICU R&B

## 2022-04-05 RX ORDER — METOPROLOL TARTRATE 50 MG/1
100 TABLET, FILM COATED ORAL 3 TIMES DAILY
Status: DISCONTINUED | OUTPATIENT
Start: 2022-04-05 | End: 2022-04-09 | Stop reason: HOSPADM

## 2022-04-05 RX ORDER — AMLODIPINE BESYLATE 10 MG/1
10 TABLET ORAL DAILY
Status: DISCONTINUED | OUTPATIENT
Start: 2022-04-05 | End: 2022-04-09 | Stop reason: HOSPADM

## 2022-04-05 RX ADMIN — TRAMADOL HYDROCHLORIDE 25 MG: 50 TABLET, COATED ORAL at 02:43

## 2022-04-05 RX ADMIN — DOCUSATE SODIUM 100 MG: 100 CAPSULE, LIQUID FILLED ORAL at 08:25

## 2022-04-05 RX ADMIN — MIRTAZAPINE 7.5 MG: 15 TABLET, FILM COATED ORAL at 19:50

## 2022-04-05 RX ADMIN — PAROXETINE 20 MG: 20 TABLET, FILM COATED ORAL at 08:26

## 2022-04-05 RX ADMIN — AMLODIPINE BESYLATE 10 MG: 10 TABLET ORAL at 08:26

## 2022-04-05 RX ADMIN — SODIUM CHLORIDE, PRESERVATIVE FREE 10 ML: 5 INJECTION INTRAVENOUS at 08:26

## 2022-04-05 RX ADMIN — ESMOLOL HYDROCHLORIDE IN SODIUM CHLORIDE 150 MCG/KG/MIN: 10 INJECTION INTRAVENOUS at 10:05

## 2022-04-05 RX ADMIN — POTASSIUM PHOSPHATE, MONOBASIC AND POTASSIUM PHOSPHATE, DIBASIC 20 MMOL: 224; 236 INJECTION, SOLUTION, CONCENTRATE INTRAVENOUS at 08:31

## 2022-04-05 RX ADMIN — PANTOPRAZOLE SODIUM 40 MG: 40 TABLET, DELAYED RELEASE ORAL at 06:12

## 2022-04-05 RX ADMIN — METOPROLOL TARTRATE 100 MG: 50 TABLET, FILM COATED ORAL at 08:26

## 2022-04-05 RX ADMIN — ESMOLOL HYDROCHLORIDE IN SODIUM CHLORIDE 150 MCG/KG/MIN: 10 INJECTION INTRAVENOUS at 02:07

## 2022-04-05 RX ADMIN — METOPROLOL TARTRATE 100 MG: 50 TABLET, FILM COATED ORAL at 19:50

## 2022-04-05 RX ADMIN — METOPROLOL TARTRATE 100 MG: 50 TABLET, FILM COATED ORAL at 14:04

## 2022-04-05 RX ADMIN — ESMOLOL HYDROCHLORIDE IN SODIUM CHLORIDE 150 MCG/KG/MIN: 10 INJECTION INTRAVENOUS at 06:12

## 2022-04-05 RX ADMIN — HEPARIN SODIUM 5000 UNITS: 10000 INJECTION, SOLUTION INTRAVENOUS; SUBCUTANEOUS at 06:12

## 2022-04-05 RX ADMIN — SODIUM NITROPRUSSIDE 0.75 MCG/KG/MIN: 25 INJECTION, SOLUTION, CONCENTRATE INTRAVENOUS at 20:42

## 2022-04-05 RX ADMIN — SODIUM NITROPRUSSIDE 1.5 MCG/KG/MIN: 25 INJECTION, SOLUTION, CONCENTRATE INTRAVENOUS at 06:54

## 2022-04-05 RX ADMIN — ROSUVASTATIN CALCIUM 10 MG: 10 TABLET, FILM COATED ORAL at 19:51

## 2022-04-05 RX ADMIN — FERROUS SULFATE TAB 325 MG (65 MG ELEMENTAL FE) 325 MG: 325 (65 FE) TAB at 08:25

## 2022-04-05 RX ADMIN — HEPARIN SODIUM 5000 UNITS: 10000 INJECTION, SOLUTION INTRAVENOUS; SUBCUTANEOUS at 14:04

## 2022-04-05 RX ADMIN — SODIUM CHLORIDE, PRESERVATIVE FREE 10 ML: 5 INJECTION INTRAVENOUS at 19:51

## 2022-04-05 RX ADMIN — HEPARIN SODIUM 5000 UNITS: 10000 INJECTION, SOLUTION INTRAVENOUS; SUBCUTANEOUS at 21:38

## 2022-04-05 ASSESSMENT — PAIN DESCRIPTION - PROGRESSION: CLINICAL_PROGRESSION: GRADUALLY WORSENING

## 2022-04-05 ASSESSMENT — PAIN SCALES - GENERAL
PAINLEVEL_OUTOF10: 0
PAINLEVEL_OUTOF10: 4
PAINLEVEL_OUTOF10: 0

## 2022-04-05 ASSESSMENT — PAIN DESCRIPTION - LOCATION: LOCATION: BACK

## 2022-04-05 ASSESSMENT — PAIN DESCRIPTION - DESCRIPTORS: DESCRIPTORS: ACHING;DISCOMFORT

## 2022-04-05 ASSESSMENT — PAIN DESCRIPTION - ORIENTATION: ORIENTATION: LOWER;MID

## 2022-04-05 ASSESSMENT — PAIN DESCRIPTION - PAIN TYPE: TYPE: ACUTE PAIN

## 2022-04-05 ASSESSMENT — PAIN DESCRIPTION - FREQUENCY: FREQUENCY: INTERMITTENT

## 2022-04-05 NOTE — CARE COORDINATION
Remains on esmolol and nipride drips. Po metroprolol increased to 100mg tid today and added norvasc. Will attempt to wean drips to keep sbp<120. Intermittent confusion this am. Pt is normally alert and oriented. Once able to advance activity, will need PT/OT evals. If xavier needed, choices are [facility] or [facility]. Farzad at [facility] following. Other choices are [facility] or [facility]. Will ask Tania to also follow. If able to return home, will plan [facility]. Will need [facility] orders for pt/ot. skilled nursing.

## 2022-04-05 NOTE — PROGRESS NOTES
Vascular Surgery Progress Note    Pt is being seen in f/u today regarding Type B aortic dissection    Subjective  Pt s/e.   Denies pain  Denies CP, SOB  Esmolol and nipride gtts continue  Confused when he wakes up, but resolves  Discussed with bedside nurse    Current Medications:    esmolol 150 mcg/kg/min (04/05/22 0730)    nitroprusside (NIPRIDE) 50 mg in D5W infusion 1.25 mcg/kg/min (04/05/22 0730)    sodium chloride        acetaminophen, traMADol, morphine, ondansetron, sodium chloride flush, sodium chloride, ondansetron **OR** ondansetron, polyethylene glycol, acetaminophen **OR** acetaminophen, sodium chloride, labetalol, hydrALAZINE    metoprolol tartrate  100 mg Oral TID    amLODIPine  10 mg Oral Daily    potassium phosphate IVPB  20 mmol IntraVENous Once    docusate sodium  100 mg Oral BID    sodium chloride flush  5-40 mL IntraVENous 2 times per day    ferrous sulfate  325 mg Oral Daily with breakfast    mirtazapine  7.5 mg Oral Nightly    PARoxetine  20 mg Oral Daily    pantoprazole  40 mg Oral Daily    rosuvastatin  10 mg Oral Nightly    heparin (porcine)  5,000 Units SubCUTAneous 3 times per day      PHYSICAL EXAM:    BP (!) 109/56   Pulse 63   Temp 98 °F (36.7 °C) (Oral)   Resp 20   Ht 5' 8\" (1.727 m)   Wt 169 lb 8 oz (76.9 kg)   SpO2 93%   BMI 25.77 kg/m²     Intake/Output Summary (Last 24 hours) at 4/5/2022 9207  Last data filed at 4/5/2022 0730  Gross per 24 hour   Intake 3054.7 ml   Output 1850 ml   Net 1204.7 ml        Gen: awake, alert, oriented x3, in no apparent distress  CVS: S1, S2  Resp: No increased work of breathing  Abd: soft, non tender, distended  R LE: Femoral, DP/PT +2   L LE: Femoral, DP/PT +2    LABS:    Lab Results   Component Value Date    WBC 11.9 (H) 04/05/2022    HGB 11.0 (L) 04/05/2022    HCT 33.8 (L) 04/05/2022     (L) 04/05/2022    PROTIME 14.4 (H) 04/03/2022    INR 1.3 04/03/2022    APTT 27.1 04/02/2022    K 3.6 04/05/2022    BUN 9 04/05/2022 CREATININE 0.8 04/05/2022     A/P Type B descending thoracic aortic dissection  · Continue drips to maintain SBP<120  · Wean as able and transition to PO, metoprolol increased to 100mg TID and amlodipine added  · Pain controlled - continue current regimen  · DNR-CCA  · Neurovascular exams  · Appreciate critical care management  · No vascular surgery interventions planned at this time    Kodak Schroeder, APRN - CNP

## 2022-04-05 NOTE — PROGRESS NOTES
Hospitalist Progress Note      SYNOPSIS: Patient admitted on 2022  for back pain and radiating chest pain. Pt. was found to have to have an aortic dissection. SUBJECTIVE:    Patient seen and examined. States he is feeling better, denies pain this morning, no sob, no other issues   Records reviewed. Stable overnight. No other overnight issues reported. Temp (24hrs), Av.3 °F (36.8 °C), Min:97.8 °F (36.6 °C), Max:98.7 °F (37.1 °C)    DIET: ADULT DIET; Regular; Low Sodium (2 gm)  CODE: DNR-CCA    Intake/Output Summary (Last 24 hours) at 2022 0708  Last data filed at 2022 0730  Gross per 24 hour   Intake 3174.7 ml   Output 2100 ml   Net 1074.7 ml       OBJECTIVE:    /62   Pulse 61   Temp 98.4 °F (36.9 °C) (Oral)   Resp 20   Ht 5' 8\" (1.727 m)   Wt 169 lb 8 oz (76.9 kg)   SpO2 (!) 84%   BMI 25.77 kg/m²     General appearance: No apparent distress, appears stated age and cooperative. HEENT:  Conjunctivae/corneas clear. Neck: Supple. No jugular venous distention. Respiratory: Clear to auscultation bilaterally, normal respiratory effort  Cardiovascular: Regular rate rhythm, normal S1-S2  Abdomen: Soft, nontender, nondistended  Musculoskeletal: No clubbing, cyanosis, no bilateral lower extremity edema. Brisk capillary refill. Skin:  No rashes  on visible skin  Neurologic: awake, alert and following commands     ASSESSMENT and PLAN:    · Type B aortic dissection : Patient transferred to the SICU. Vascular surgery on the case. No surgical intervention at this time. C/w anti-hypertensive mgmt- on PO lopressor 100 mg TID . Analgesics PRN. Telemetry. · HTN: C/w esmolol and nitroprusside started in the ED with transition to metoprolol. Patient's home dose of lisinopril was not ordered  · GERD: Protonix  · Hypokalemia: resolved  · Psoriasis: Patient follows with dermatology OP  ·  Anemia: Monitor patient's Hb/HCT.  Patient's HB had dropped- all anticoagulants have been D/c. 18% Iron Sat. Patient has been taking daily iron OP which has been ordered from admission. No further drops in the hb today.    · HLD: C/w crestor  ·  Depression: C/w remeron and paxil. · DVT proph:Scds ordered. · Constipation: colace ordered. miralax prn. · Decreased appetite: dietician is pending. Hypophos: supplemented.      DISPOSITION:     Medications:  REVIEWED DAILY    Infusion Medications    esmolol 150 mcg/kg/min (04/05/22 0730)    nitroprusside (NIPRIDE) 50 mg in D5W infusion 1.25 mcg/kg/min (04/05/22 0730)    sodium chloride       Scheduled Medications    metoprolol tartrate  100 mg Oral TID    amLODIPine  10 mg Oral Daily    potassium phosphate IVPB  20 mmol IntraVENous Once    docusate sodium  100 mg Oral BID    sodium chloride flush  5-40 mL IntraVENous 2 times per day    ferrous sulfate  325 mg Oral Daily with breakfast    mirtazapine  7.5 mg Oral Nightly    PARoxetine  20 mg Oral Daily    pantoprazole  40 mg Oral Daily    rosuvastatin  10 mg Oral Nightly    heparin (porcine)  5,000 Units SubCUTAneous 3 times per day     PRN Meds: acetaminophen, traMADol, morphine, ondansetron, sodium chloride flush, sodium chloride, ondansetron **OR** ondansetron, polyethylene glycol, acetaminophen **OR** acetaminophen, sodium chloride, labetalol, hydrALAZINE    Labs:     Recent Labs     04/03/22 0400 04/04/22  0500 04/05/22  0430   WBC 8.2 9.1 11.9*   HGB 10.6* 10.2* 11.0*   HCT 32.6* 31.1* 33.8*   * 118* 129*       Recent Labs     04/03/22 0400 04/04/22  0500 04/05/22  0430    135 136   K 4.2 3.7 3.6   * 105 104   CO2 22 25 24   BUN 10 10 9   CREATININE 0.9 1.0 0.8   CALCIUM 7.5* 7.9* 8.2*   PHOS 3.6 2.2* 2.1*       Recent Labs     04/03/22  0400 04/04/22  0500 04/05/22  0430   PROT 5.3* 5.5* 6.1*   ALKPHOS 55 60 60   ALT 9 8 8   AST 21 14 14   BILITOT 0.8 1.2 1.2       Recent Labs     04/02/22  1527 04/03/22  0400   INR 1.1 1.3       No results for input(s): Jacqueline Guadarrama in the last 72 hours. Chronic labs:    Lab Results   Component Value Date    CHOL 116 04/03/2022    TRIG 69 04/03/2022    HDL 40 04/03/2022    LDLCALC 62 04/03/2022    PSA 1.88 10/21/2015    INR 1.3 04/03/2022       Radiology: REVIEWED DAILY    +++++++++++++++++++++++++++++++++++++++++++++++++  Rom Kline MD  [facility] Physician - [address] [place] [address] [facility]  +++++++++++++++++++++++++++++++++++++++++++++++++  NOTE: This report was transcribed using voice recognition software. Every effort was made to ensure accuracy; however, inadvertent computerized transcription errors may be present.

## 2022-04-05 NOTE — PROGRESS NOTES
Comprehensive Nutrition Assessment    Type and Reason for Visit:  Initial,Consult (Poor Intake >5days)    Nutrition Recommendations/Plan: Recommend and start Ensure Enlive BID and magic cup once daily to optimize intake. Nutrition Assessment:  Pt. presented d/t back pain. Noted CT imaging showed aortic dissection. Pt. continues with IV antihypertensives and ICU monitoring. PMHx noted for CAD, BPH, OCD. Pt. with limited/poor PO intake since admit and reports early satiety. Will start ONS and monitor. Malnutrition Assessment:  Malnutrition Status: At risk for malnutrition (Comment)    Context:  Chronic Illness     Findings of the 6 clinical characteristics of malnutrition:  Energy Intake:  Mild decrease in energy intake (Comment)  Weight Loss:  7 - 10% over 6 months (10.8% <6mo)     Body Fat Loss:  Unable to assess     Muscle Mass Loss:  Unable to assess    Fluid Accumulation:  No significant fluid accumulation     Strength:  Not Performed    Estimated Daily Nutrient Needs:  Energy (kcal):  1394 x 1.2 SF= 1600-1700kcal; Weight Used for Energy Requirements:        Protein (g):  89-104g (1.2-1.4g/kg CBW); Weight Used for Protein Requirements:  Current        Fluid (ml/day):  per critical care; Method Used for Fluid Requirements:  Other (Comment)      Nutrition Related Findings:  +I/O 246ml, A&OX3 confused at times, +BS , +edema, 2LNC, hypophosphatemia      Wounds:  Skin Tears (x1 elbow skin tear)       Current Nutrition Therapies:    ADULT DIET; Regular;  Low Sodium (2 gm)  ADULT ORAL NUTRITION SUPPLEMENT; Breakfast, Lunch; Standard High Calorie/High Protein Oral Supplement  ADULT ORAL NUTRITION SUPPLEMENT; Dinner; Frozen Oral Supplement    Anthropometric Measures:  · Height: 5' 8\" (172.7 cm)  · Current Body Weight: 163 lb (73.9 kg) (4/02 Actual)   · Admission Body Weight: 163 lb (73.9 kg) (4/02 actual)    · Usual Body Weight: 182 lb 12.8 oz (82.9 kg) (11/23/21 unspecified method)     · Ideal Body Weight: 154 lbs; % Ideal Body Weight 105.8 %   · BMI: 24.8  · BMI Categories: Normal Weight (BMI 22.0 to 24.9) age over 72       Nutrition Diagnosis:   · Inadequate oral intake related to early satiety as evidenced by poor intake prior to admission,intake 0-25% (Pt. reports early satiety/poor intake since admit per MD note)      Nutrition Interventions:   Food and/or Nutrient Delivery:  Continue Current Diet,Start Oral Nutrition Supplement (Start Ensure Enlive BID , Magic cup once daily)  Nutrition Education/Counseling:  Education not indicated   Coordination of Nutrition Care:  Continue to monitor while inpatient    Goals:  Pt. to consume >50% of meals/ONS       Nutrition Monitoring and Evaluation:   Behavioral-Environmental Outcomes:  None Identified   Food/Nutrient Intake Outcomes:  Food and Nutrient Intake,Supplement Intake  Physical Signs/Symptoms Outcomes:  Biochemical Data,GI Status,Hemodynamic Status,Meal Time Behavior,Nutrition Focused Physical Findings,Skin,Weight     Discharge Planning:     Too soon to determine     Electronically signed by Ewelina Cuadra RD on 4/5/22 at 11:52 AM EDT    Contact: ext [phone]

## 2022-04-05 NOTE — PROGRESS NOTES
Surgical Intensive Care Unit   Daily Progress Note     Date of admission: 4/2/2022    Reason for ICU: Type B aortic dissection    Pertinent Hospital Course Events:   4/3: Presented to ER for chest pain through to back, found to have type B dissection. Started on nipride and esmolol. Vascular surgery consulted. 4/4: Remains on esmolol and nitroprusside. 4/5: Remains on esmolol and nitroprusside. Nipride had to be increased overnight, increased metoprolol and added norvasc. Overnight Events: No issues overnight    Subjective: No complaints on exam. Appears more confused today     Physical Exam:   BP (!) 105/58   Pulse 66   Temp 98.4 °F (36.9 °C) (Oral)   Resp 22   Ht 5' 8\" (1.727 m)   Wt 169 lb 8 oz (76.9 kg)   SpO2 94%   BMI 25.77 kg/m²     I/O last 3 completed shifts: In: 4262 [P.O.:720; I.V.:2801.3; IV Piggyback:245.7]  Out: 1900 [Urine:1900]  I/O this shift: In: 587.3 [P.O.:120; I.V.:467.3]  Out: 1200 [Urine:1200]    General: No apparent distress, comfortable  HEENT: Trachea midline, no masses, Pupils equal round  Chest: Respiratory effort was normal with no retractions or use of accessory muscles. Cardiovascular: Heart sounds were normal with a regular rate  Abdomen:  Soft and non distended. No tenderness, guarding, rebound, or rigidity  Extremities: Moves all 4 extremeties, No pedal edema. 2+ peripheral pulses bilaterally. Assessment/Plan:      Neuro: GCS 14, pain control   CV: bradycardic rate, no acute issues- maintain BP <120, HR <60.  On nipride and esmolol, transition to PO meds- 100 metoprolol TID, Norvasc 10 daily   Pulm: tolerating room air, no acute issues, encourage IS   GI: regular diet, bowel regimen   Renal: Creatinine within normal limits, no edema indicating fluid overload, UOP adequate   ID: afebrile   Endo: glucose near normal range, no acute issues   MSK: no acute issues   Heme: no acute issues, heparin      Code status:  DNR-CCA    Disposition:  Continue ICU

## 2022-04-05 NOTE — PROGRESS NOTES
Hospitalist Progress Note      SYNOPSIS: Patient admitted on 2022 for back pain  With radiating chest pain. Pt. was found to have to have an aortic dissection. SUBJECTIVE:    Patient seen and examined. He states that he has not been eating; early satiety since being in the hospital x 2 days. Last bm was 4 days ago. Back pain has improved; No CP. No abd pain. No n/v/f/c. Temp (24hrs), Av.3 °F (36.8 °C), Min:97.8 °F (36.6 °C), Max:98.7 °F (37.1 °C)    DIET: ADULT DIET; Regular; Low Sodium (2 gm)  CODE: DNR-CCA    Intake/Output Summary (Last 24 hours) at 2022 0311  Last data filed at 2022 0000  Gross per 24 hour   Intake 3307.64 ml   Output 1700 ml   Net 1607.64 ml       OBJECTIVE:    /69   Pulse 67   Temp 98.6 °F (37 °C) (Oral)   Resp 22   Ht 5' 8\" (1.727 m)   Wt 169 lb 8 oz (76.9 kg)   SpO2 93%   BMI 25.77 kg/m²     General appearance: No apparent distress, appears stated age and cooperative. HEENT:  Conjunctivae/corneas clear. Neck: Supple. No jugular venous distention. Respiratory: Clear to auscultation bilaterally, normal respiratory effort; mild exp wheeze  Cardiovascular: Regular rate rhythm, normal S1-S2  Abdomen: Soft, nontender, nondistended  Musculoskeletal: No clubbing, cyanosis, no bilateral lower extremity edema. Brisk capillary refill. Skin:  No rashes  on visible skin  Neurologic: awake, alert and following commands     ASSESSMENT:  80y.o. year old male  who  has a past medical history of Acute pancreatitis, Anxiety, BPH (benign prostatic hyperplasia), CAD (coronary artery disease), GERD (gastroesophageal reflux disease), Hyperlipidemia, Obsessive compulsive disorder, and Psoriasis. who presents to the hospital with complaints of back pain that radiated to his left ribs. CTA of the chest which showed: presence of a [place] B acute aortic syndrome with hematoma; elevated HTN of 207/110.       PLAN:    1.) Aortic dissection with hematoma: Patient transferred to the SICU. Vascular/ICU on consult- appreciate recommendations. C/w anti-hypertensive mgmt- attempting transfer to PO metoprolol (dose increased today) . Analgesics PRN. Telemetry. 2.) Back/CP secondary to #1;   3.) HTN: C/w esmolol and nitroprusside started in the ED with transition to metoprolol. Patient's home dose of lisinopril was not ordered. 4.) GERD: Protonix. 5.) Hypokalemia: resolved. 6.)  Psoriasis: Patient follows with dermatology OP.   7.) Anemia: Monitor patient's Hb/HCT. Patient's HB had dropped- all anticoagulants have been D/c. 18% Iron Sat. Patient has been taking daily iron OP which has been ordered from admission. No further drops in the hb today. 8.) HLD: C/w crestor. 9.) Depression: C/w remeron and paxil. 10.) DVT proph:Scds ordered. 11.)  Constipation: colace ordered. miralax prn.   12.)  Decreased appetite: dietician is pending. 13.)  Hypophos: supplemented.    14.) mild wheeze on exam: chest xray was neg    DISPOSITION: TBD    Medications:  REVIEWED DAILY      Infusion Medications    esmolol 150 mcg/kg/min (04/05/22 0207)    nitroprusside (NIPRIDE) 50 mg in D5W infusion 1.75 mcg/kg/min (04/05/22 0205)    sodium chloride       Scheduled Medications    metoprolol tartrate  100 mg Oral BID    docusate sodium  100 mg Oral BID    sodium chloride flush  5-40 mL IntraVENous 2 times per day    ferrous sulfate  325 mg Oral Daily with breakfast    mirtazapine  7.5 mg Oral Nightly    PARoxetine  20 mg Oral Daily    pantoprazole  40 mg Oral Daily    rosuvastatin  10 mg Oral Nightly    heparin (porcine)  5,000 Units SubCUTAneous 3 times per day     PRN Meds: acetaminophen, traMADol, morphine, ondansetron, sodium chloride flush, sodium chloride, ondansetron **OR** ondansetron, polyethylene glycol, acetaminophen **OR** acetaminophen, sodium chloride, labetalol, hydrALAZINE        Labs:     Recent Labs     04/02/22  1527 04/03/22  0400 04/04/22  0500   WBC 13.5* 8.2 9.1   HGB 13.5 10.6* 10.2*   HCT 41.1 32.6* 31.1*    122* 118*       Recent Labs     04/02/22  1527 04/03/22  0400 04/04/22  0500    141 135   K 3.2* 4.2 3.7    111* 105   CO2 22 22 25   BUN 10 10 10   CREATININE 0.9 0.9 1.0   CALCIUM 9.0 7.5* 7.9*   PHOS  --  3.6 2.2*       Recent Labs     04/02/22  1527 04/03/22  0400 04/04/22  0500   PROT 7.7 5.3* 5.5*   ALKPHOS 86 55 60   ALT 15 9 8   AST 26 21 14   BILITOT 0.9 0.8 1.2       Recent Labs     04/02/22  1527 04/03/22  0400   INR 1.1 1.3       No results for input(s): Nya Loomis in the last 72 hours. Chronic labs:    Lab Results   Component Value Date    CHOL 116 04/03/2022    TRIG 69 04/03/2022    HDL 40 04/03/2022    LDLCALC 62 04/03/2022    PSA 1.88 10/21/2015    INR 1.3 04/03/2022       Radiology: REVIEWED DAILY    +++++++++++++++++++++++++++++++++++++++++++++++++  Tone Aguila MD  [facility] Physician - [address] [facility], [place]  +++++++++++++++++++++++++++++++++++++++++++++++++  NOTE: This report was transcribed using voice recognition software. Every effort was made to ensure accuracy; however, inadvertent computerized transcription errors may be present.

## 2022-04-05 NOTE — PROGRESS NOTES
Vascular Surgery Progress Note    Pt is being seen in f/u today regarding Type B Aortic Dissection    Subjective  No acute events overnight. Arterial line was removed. Remains on antihypertensive infusions reaching goal.  Some confusion this morning. No change in neurovascular examination.     Current Medications:    esmolol 150 mcg/kg/min (04/05/22 0612)    nitroprusside (NIPRIDE) 50 mg in D5W infusion 1.25 mcg/kg/min (04/05/22 0708)    sodium chloride        acetaminophen, traMADol, morphine, ondansetron, sodium chloride flush, sodium chloride, ondansetron **OR** ondansetron, polyethylene glycol, acetaminophen **OR** acetaminophen, sodium chloride, labetalol, hydrALAZINE    metoprolol tartrate  100 mg Oral TID    amLODIPine  10 mg Oral Daily    docusate sodium  100 mg Oral BID    sodium chloride flush  5-40 mL IntraVENous 2 times per day    ferrous sulfate  325 mg Oral Daily with breakfast    mirtazapine  7.5 mg Oral Nightly    PARoxetine  20 mg Oral Daily    pantoprazole  40 mg Oral Daily    rosuvastatin  10 mg Oral Nightly    heparin (porcine)  5,000 Units SubCUTAneous 3 times per day        PHYSICAL EXAM:    /60   Pulse 65   Temp 98.4 °F (36.9 °C) (Oral)   Resp 21   Ht 5' 8\" (1.727 m)   Wt 169 lb 8 oz (76.9 kg)   SpO2 97%   BMI 25.77 kg/m²     Intake/Output Summary (Last 24 hours) at 4/5/2022 0711  Last data filed at 4/5/2022 0600  Gross per 24 hour   Intake 3028.31 ml   Output 2100 ml   Net 928.31 ml          Gen: no apparent distress, responds to questions  CVS: rate in the 25-10M, bp with systolic <182 on arterial line  Resp: symmetric chest rise, no audible wheezing  Abd: Soft, non-tender, non-distended  RUE: +2 femoral, DP, PT pulses, motor 5/5 throughout and sensation present to light touch  LUE+2 femoral, DP, PT pulses;  motor 5/5 throughout and sensation present to light touch    LABS:    Lab Results   Component Value Date    WBC 11.9 (H) 04/05/2022    HGB 11.0 (L) 04/05/2022 HCT 33.8 (L) 04/05/2022     (L) 04/05/2022    PROTIME 14.4 (H) 04/03/2022    INR 1.3 04/03/2022    APTT 27.1 04/02/2022    K 3.6 04/05/2022    BUN 9 04/05/2022    CREATININE 0.8 04/05/2022       A/P  80 y.o. male with Type B Aortic Dissection. Some confusion this morning.   Reaching goal on IV infusions transitioning to p.o.    - Okay for diet from vascular POV  - Pain control PRN  - Monitor confusion, see if this gets better on its own throughout the day; ABG this morning has been ordered for further evaluation  - Maintain SBP<120 and HR<75  - transition from IV antihypertensives to Claudia Jones MD    Pt seen and examined  Off esmolol  On nipride only    Continue to adjust po meds per critical care    Confusion seems better    Patient denies significant chest pain, he has some nausea    Charanjit Dowell MD

## 2022-04-05 NOTE — PLAN OF CARE
Problem: Pain:   Goal: Pain level will decrease  Description: Pain level will decrease  Outcome: Met This Shift  Goal: Control of acute pain  Description: Control of acute pain  Outcome: Met This Shift  Goal: Control of chronic pain  Description: Control of chronic pain  Outcome: Met This Shift     Problem: Falls - Risk of:  Goal: Will remain free from falls  Description: Will remain free from falls  Outcome: Met This Shift  Goal: Absence of physical injury  Description: Absence of physical injury  Outcome: Met This Shift     Problem: Cardiac:  Goal: Ability to maintain an adequate cardiac output will improve  Description: Ability to maintain an adequate cardiac output will improve  Outcome: Met This Shift  Goal: Hemodynamic stability will improve  Description: Hemodynamic stability will improve  Outcome: Met This Shift     Problem: Fluid Volume:  Goal: Ability to achieve and maintain adequate urine output will improve  Description: Ability to achieve and maintain adequate urine output will improve  Outcome: Met This Shift     Problem: Respiratory:  Goal: Respiratory status will improve  Description: Respiratory status will improve  Outcome: Not Met This Shift     Problem: Inadequate oral food/beverage intake (NI-2.1)  Goal: Food and/or Nutrient Delivery  Description: Individualized approach for food/nutrient provision.   4/5/2022 1154 by Marcellus Curtis RD  Outcome: Met This Shift

## 2022-04-05 NOTE — PROGRESS NOTES
CC: Type B aortic dissection    SIGNIFICANT 24 HOUR EVENTS/NEW COMPLAINTS:  4/3-admitted from ED yesterday with back pain. CT imaging demonstrated contained type B thoracic aortic dissection. Admitted to CVICU and started on intravenous antihypertensives  4/4 remains on esmolol drip at 150 mics per kilo per minute and nitroprusside at 1.5 mics per kilo per minute  4/5 esmolol drip and nitroprusside drip requirements have decreased    PHYSICAL EXAM:  General -pleasant calm 80-year-old white man  Neuro -awake and alert x3  Head/Face/Neck -no visible mass  CV -normal sinus rhythm  2+ bilateral radial and femoral pulses  Pulm -nonlabored. Abdomen soft nontender nondistended        ASSESSMENT:  Type B thoracic aortic dissection  Massive hiatal hernia  Atherosclerotic cardiovascular disease  Acute blood loss anemia  Incidental left inguinal hernia  History of recent Shigella infection    PLAN:  Keep systolic blood pressure less than 120 and heart rate less than 60  Patient needs maximal impulse reduction therapy  Currently on esmolol drip at 100 mics per kilo per minute and nitroprusside 1. mics per kilo per minute  We will wean esmolol drip. Increase Lopressor to 100 mg 3 times daily and Norvasc 10 mg daily  General diet  Renal-creatinine stable at 1.0. Continue to monitor urine output and renal function    Monitor H&H  SCDs, heparin 3 times daily  DNR-CCA  Continue ICU level care        The patient is at significant risk for life-threatening hemodynamic deterioration and death from his aortic dissection and requires ongoing critical care management. Because of his aortic dissection, he needs frequent hemodynamic monitoring    Critical care time exclusive of teaching and procedures = 33minutes              Avelino Luna MD, FACS  4/5/2022  12:47 PM    NOTE: This report was transcribed using voice recognition software.  Every effort was made to ensure accuracy; however, inadvertent computerized transcription errors may be present.

## 2022-04-06 ENCOUNTER — APPOINTMENT (OUTPATIENT)
Dept: GENERAL RADIOLOGY | Age: 87
DRG: 300 | End: 2022-04-06
Payer: MEDICARE

## 2022-04-06 LAB
ALBUMIN SERPL-MCNC: 2.7 G/DL (ref 3.5–5.2)
ALP BLD-CCNC: 67 U/L (ref 40–129)
ALT SERPL-CCNC: 10 U/L (ref 0–40)
ANION GAP SERPL CALCULATED.3IONS-SCNC: 8 MMOL/L (ref 7–16)
APPEARANCE FLUID: NORMAL
AST SERPL-CCNC: 21 U/L (ref 0–39)
BASOPHILS ABSOLUTE: 0.04 E9/L (ref 0–0.2)
BASOPHILS RELATIVE PERCENT: 0.3 % (ref 0–2)
BILIRUB SERPL-MCNC: 1.2 MG/DL (ref 0–1.2)
BUN BLDV-MCNC: 8 MG/DL (ref 6–23)
BURR CELLS: ABNORMAL
CALCIUM SERPL-MCNC: 8.5 MG/DL (ref 8.6–10.2)
CELL COUNT FLUID TYPE: NORMAL
CHLORIDE BLD-SCNC: 102 MMOL/L (ref 98–107)
CO2: 24 MMOL/L (ref 22–29)
COLOR FLUID: NORMAL
CREAT SERPL-MCNC: 0.8 MG/DL (ref 0.7–1.2)
CRYSTALS, FLUID: NORMAL
EOSINOPHILS ABSOLUTE: 0.28 E9/L (ref 0.05–0.5)
EOSINOPHILS RELATIVE PERCENT: 2.1 % (ref 0–6)
FLUID TYPE: NORMAL
GFR AFRICAN AMERICAN: >60
GFR NON-AFRICAN AMERICAN: >60 ML/MIN/1.73
GLUCOSE BLD-MCNC: 94 MG/DL (ref 74–99)
GLUCOSE, FLUID: 51 MG/DL
HCT VFR BLD CALC: 36.4 % (ref 37–54)
HEMOGLOBIN: 11.7 G/DL (ref 12.5–16.5)
IMMATURE GRANULOCYTES #: 0.05 E9/L
IMMATURE GRANULOCYTES %: 0.4 % (ref 0–5)
LYMPHOCYTES ABSOLUTE: 2.43 E9/L (ref 1.5–4)
LYMPHOCYTES RELATIVE PERCENT: 18.5 % (ref 20–42)
MAGNESIUM: 2.1 MG/DL (ref 1.6–2.6)
MCH RBC QN AUTO: 30.5 PG (ref 26–35)
MCHC RBC AUTO-ENTMCNC: 32.1 % (ref 32–34.5)
MCV RBC AUTO: 94.8 FL (ref 80–99.9)
MONOCYTE, FLUID: 10 %
MONOCYTES ABSOLUTE: 2.38 E9/L (ref 0.1–0.95)
MONOCYTES RELATIVE PERCENT: 18.2 % (ref 2–12)
NEUTROPHIL, FLUID: 90 %
NEUTROPHILS ABSOLUTE: 7.92 E9/L (ref 1.8–7.3)
NEUTROPHILS RELATIVE PERCENT: 60.5 % (ref 43–80)
NUCLEATED CELLS FLUID: NORMAL /UL
OVALOCYTES: ABNORMAL
PDW BLD-RTO: 14.9 FL (ref 11.5–15)
PHOSPHORUS: 1.9 MG/DL (ref 2.5–4.5)
PLATELET # BLD: 149 E9/L (ref 130–450)
PMV BLD AUTO: 11.3 FL (ref 7–12)
POIKILOCYTES: ABNORMAL
POLYCHROMASIA: ABNORMAL
POTASSIUM SERPL-SCNC: 4.4 MMOL/L (ref 3.5–5)
RBC # BLD: 3.84 E12/L (ref 3.8–5.8)
RBC FLUID: 8000 /UL
SODIUM BLD-SCNC: 134 MMOL/L (ref 132–146)
SOURCE BODY FLUID: NORMAL
TOTAL PROTEIN: 6.1 G/DL (ref 6.4–8.3)
WBC # BLD: 13.1 E9/L (ref 4.5–11.5)

## 2022-04-06 PROCEDURE — 2580000003 HC RX 258: Performed by: STUDENT IN AN ORGANIZED HEALTH CARE EDUCATION/TRAINING PROGRAM

## 2022-04-06 PROCEDURE — 99232 SBSQ HOSP IP/OBS MODERATE 35: CPT

## 2022-04-06 PROCEDURE — 89060 EXAM SYNOVIAL FLUID CRYSTALS: CPT

## 2022-04-06 PROCEDURE — 6370000000 HC RX 637 (ALT 250 FOR IP): Performed by: STUDENT IN AN ORGANIZED HEALTH CARE EDUCATION/TRAINING PROGRAM

## 2022-04-06 PROCEDURE — 2580000003 HC RX 258: Performed by: INTERNAL MEDICINE

## 2022-04-06 PROCEDURE — 87205 SMEAR GRAM STAIN: CPT

## 2022-04-06 PROCEDURE — 96368 THER/DIAG CONCURRENT INF: CPT

## 2022-04-06 PROCEDURE — 71045 X-RAY EXAM CHEST 1 VIEW: CPT

## 2022-04-06 PROCEDURE — 2700000000 HC OXYGEN THERAPY PER DAY

## 2022-04-06 PROCEDURE — 6360000002 HC RX W HCPCS: Performed by: STUDENT IN AN ORGANIZED HEALTH CARE EDUCATION/TRAINING PROGRAM

## 2022-04-06 PROCEDURE — 83735 ASSAY OF MAGNESIUM: CPT

## 2022-04-06 PROCEDURE — 80053 COMPREHEN METABOLIC PANEL: CPT

## 2022-04-06 PROCEDURE — 2500000003 HC RX 250 WO HCPCS: Performed by: STUDENT IN AN ORGANIZED HEALTH CARE EDUCATION/TRAINING PROGRAM

## 2022-04-06 PROCEDURE — 73562 X-RAY EXAM OF KNEE 3: CPT

## 2022-04-06 PROCEDURE — 2000000000 HC ICU R&B

## 2022-04-06 PROCEDURE — 99291 CRITICAL CARE FIRST HOUR: CPT | Performed by: SURGERY

## 2022-04-06 PROCEDURE — 82947 ASSAY GLUCOSE BLOOD QUANT: CPT

## 2022-04-06 PROCEDURE — 84100 ASSAY OF PHOSPHORUS: CPT

## 2022-04-06 PROCEDURE — 96375 TX/PRO/DX INJ NEW DRUG ADDON: CPT

## 2022-04-06 PROCEDURE — 89051 BODY FLUID CELL COUNT: CPT

## 2022-04-06 PROCEDURE — 85025 COMPLETE CBC W/AUTO DIFF WBC: CPT

## 2022-04-06 PROCEDURE — 36415 COLL VENOUS BLD VENIPUNCTURE: CPT

## 2022-04-06 PROCEDURE — 87070 CULTURE OTHR SPECIMN AEROBIC: CPT

## 2022-04-06 PROCEDURE — 96372 THER/PROPH/DIAG INJ SC/IM: CPT

## 2022-04-06 PROCEDURE — 96366 THER/PROPH/DIAG IV INF ADDON: CPT

## 2022-04-06 PROCEDURE — 6370000000 HC RX 637 (ALT 250 FOR IP): Performed by: INTERNAL MEDICINE

## 2022-04-06 PROCEDURE — 73030 X-RAY EXAM OF SHOULDER: CPT

## 2022-04-06 RX ORDER — LISINOPRIL 20 MG/1
20 TABLET ORAL DAILY
Status: DISCONTINUED | OUTPATIENT
Start: 2022-04-06 | End: 2022-04-09 | Stop reason: HOSPADM

## 2022-04-06 RX ORDER — POTASSIUM CHLORIDE 20 MEQ/1
40 TABLET, EXTENDED RELEASE ORAL ONCE
Status: COMPLETED | OUTPATIENT
Start: 2022-04-06 | End: 2022-04-06

## 2022-04-06 RX ORDER — FUROSEMIDE 20 MG/1
40 TABLET ORAL DAILY
Status: DISCONTINUED | OUTPATIENT
Start: 2022-04-06 | End: 2022-04-09 | Stop reason: HOSPADM

## 2022-04-06 RX ORDER — FENTANYL CITRATE 50 UG/ML
25 INJECTION, SOLUTION INTRAMUSCULAR; INTRAVENOUS
Status: DISCONTINUED | OUTPATIENT
Start: 2022-04-06 | End: 2022-04-07

## 2022-04-06 RX ORDER — ESMOLOL HYDROCHLORIDE 10 MG/ML
25-300 INJECTION, SOLUTION INTRAVENOUS CONTINUOUS
Status: DISCONTINUED | OUTPATIENT
Start: 2022-04-06 | End: 2022-04-06

## 2022-04-06 RX ORDER — LABETALOL HYDROCHLORIDE 5 MG/ML
10 INJECTION, SOLUTION INTRAVENOUS EVERY 10 MIN PRN
Status: DISCONTINUED | OUTPATIENT
Start: 2022-04-06 | End: 2022-04-09 | Stop reason: HOSPADM

## 2022-04-06 RX ORDER — HYDRALAZINE HYDROCHLORIDE 20 MG/ML
10 INJECTION INTRAMUSCULAR; INTRAVENOUS EVERY 10 MIN PRN
Status: DISCONTINUED | OUTPATIENT
Start: 2022-04-06 | End: 2022-04-09 | Stop reason: HOSPADM

## 2022-04-06 RX ADMIN — DOCUSATE SODIUM 100 MG: 100 CAPSULE, LIQUID FILLED ORAL at 20:35

## 2022-04-06 RX ADMIN — HYDRALAZINE HYDROCHLORIDE 10 MG: 20 INJECTION INTRAMUSCULAR; INTRAVENOUS at 18:37

## 2022-04-06 RX ADMIN — LISINOPRIL 20 MG: 20 TABLET ORAL at 13:19

## 2022-04-06 RX ADMIN — SODIUM NITROPRUSSIDE 1 MCG/KG/MIN: 25 INJECTION, SOLUTION, CONCENTRATE INTRAVENOUS at 06:36

## 2022-04-06 RX ADMIN — FERROUS SULFATE TAB 325 MG (65 MG ELEMENTAL FE) 325 MG: 325 (65 FE) TAB at 08:12

## 2022-04-06 RX ADMIN — AMLODIPINE BESYLATE 10 MG: 10 TABLET ORAL at 08:16

## 2022-04-06 RX ADMIN — PAROXETINE 20 MG: 20 TABLET, FILM COATED ORAL at 08:12

## 2022-04-06 RX ADMIN — HEPARIN SODIUM 5000 UNITS: 10000 INJECTION, SOLUTION INTRAVENOUS; SUBCUTANEOUS at 06:29

## 2022-04-06 RX ADMIN — SODIUM CHLORIDE, PRESERVATIVE FREE 10 ML: 5 INJECTION INTRAVENOUS at 08:17

## 2022-04-06 RX ADMIN — HEPARIN SODIUM 5000 UNITS: 10000 INJECTION, SOLUTION INTRAVENOUS; SUBCUTANEOUS at 21:15

## 2022-04-06 RX ADMIN — ACETAMINOPHEN 650 MG: 325 TABLET ORAL at 20:34

## 2022-04-06 RX ADMIN — SODIUM CHLORIDE, PRESERVATIVE FREE 10 ML: 5 INJECTION INTRAVENOUS at 20:35

## 2022-04-06 RX ADMIN — ESMOLOL HYDROCHLORIDE IN SODIUM CHLORIDE 100 MCG/KG/MIN: 10 INJECTION INTRAVENOUS at 11:39

## 2022-04-06 RX ADMIN — ESMOLOL HYDROCHLORIDE IN SODIUM CHLORIDE 100 MCG/KG/MIN: 10 INJECTION INTRAVENOUS at 05:44

## 2022-04-06 RX ADMIN — METOPROLOL TARTRATE 100 MG: 50 TABLET, FILM COATED ORAL at 14:24

## 2022-04-06 RX ADMIN — METOPROLOL TARTRATE 100 MG: 50 TABLET, FILM COATED ORAL at 20:34

## 2022-04-06 RX ADMIN — ROSUVASTATIN CALCIUM 10 MG: 10 TABLET, FILM COATED ORAL at 20:35

## 2022-04-06 RX ADMIN — PANTOPRAZOLE SODIUM 40 MG: 40 TABLET, DELAYED RELEASE ORAL at 08:12

## 2022-04-06 RX ADMIN — FUROSEMIDE 40 MG: 20 TABLET ORAL at 10:48

## 2022-04-06 RX ADMIN — POTASSIUM CHLORIDE 40 MEQ: 20 TABLET, EXTENDED RELEASE ORAL at 18:30

## 2022-04-06 RX ADMIN — SODIUM PHOSPHATE, MONOBASIC, MONOHYDRATE 30 MMOL: 276; 142 INJECTION, SOLUTION INTRAVENOUS at 09:49

## 2022-04-06 RX ADMIN — MIRTAZAPINE 7.5 MG: 15 TABLET, FILM COATED ORAL at 20:34

## 2022-04-06 RX ADMIN — PANTOPRAZOLE SODIUM 40 MG: 40 TABLET, DELAYED RELEASE ORAL at 06:29

## 2022-04-06 RX ADMIN — HEPARIN SODIUM 5000 UNITS: 10000 INJECTION, SOLUTION INTRAVENOUS; SUBCUTANEOUS at 14:24

## 2022-04-06 RX ADMIN — METOPROLOL TARTRATE 100 MG: 50 TABLET, FILM COATED ORAL at 08:12

## 2022-04-06 ASSESSMENT — PAIN SCALES - GENERAL
PAINLEVEL_OUTOF10: 0

## 2022-04-06 NOTE — CONSULTS
Department of Orthopedic Surgery  Resident Consult Note          Reason for Consult:  R knee effusion, R should pain    HISTORY OF PRESENT ILLNESS:       Patient is a 80 y.o. male with a past medical history anxiety, psoriasis, coronary artery disease, hyperlipidemia, thoracic aortic dissection who presents with the chief complaint of right knee pain and swelling as well as right shoulder pain. Of note patient is being treated for active thoracic aortic dissection type B. States that his knee pain has been going on for the past 24 to 48 hours. Denies any recent trauma while in the hospital.  Denies any recent falls outside the hospital.  States he is a low level community ambulator. States he uses a cane to assist with ambulation. Regards to the shoulder he states it has been bothering him for about a week now. No recent or historical trauma to the right shoulder. Denies any previous history of this with right shoulder pain. States he is right-hand dominant. States it hurts with any motion of the shoulder. Most prominently anteriorly. Denies any history of previous right knee or right shoulder pain. Denies numbness/tingling/paresthesias. Denies any other orthopedic complaints at this time.       Past Medical History:        Diagnosis Date    Acute pancreatitis     6/06 AND 11/09    Anxiety     BPH (benign prostatic hyperplasia)     CAD (coronary artery disease)     GERD (gastroesophageal reflux disease)     Hyperlipidemia     Obsessive compulsive disorder     Psoriasis      Past Surgical History:        Procedure Laterality Date    CATARACT REMOVAL Bilateral 08/24/2017    COLONOSCOPY  03/14/2017    THROAT SURGERY      UPPER GASTROINTESTINAL ENDOSCOPY  03/14/2017     Current Medications:   Current Facility-Administered Medications: sodium phosphate 30 mmol in dextrose 5 % 500 mL IVPB, 30 mmol, IntraVENous, Once  fentaNYL (SUBLIMAZE) injection 25 mcg, 25 mcg, IntraVENous, Q1H PRN  metoprolol tartrate (LOPRESSOR) tablet 100 mg, 100 mg, Oral, TID  amLODIPine (NORVASC) tablet 10 mg, 10 mg, Oral, Daily  docusate sodium (COLACE) capsule 100 mg, 100 mg, Oral, BID  esmolol (BREVIBLOC) 2.5gm/250ml NS infusion,  mcg/kg/min, IntraVENous, Continuous  acetaminophen (TYLENOL) tablet 650 mg, 650 mg, Oral, Q4H PRN  traMADol (ULTRAM) tablet 25 mg, 25 mg, Oral, Q6H PRN  morphine (PF) injection 1 mg, 1 mg, IntraVENous, Q4H PRN  ondansetron (ZOFRAN) injection 4 mg, 4 mg, IntraVENous, Q6H PRN  nitroPRUSSide (NIPRIDE) 50 mg in dextrose 5 % 250 mL infusion, 0.1-2 mcg/kg/min, IntraVENous, Continuous  sodium chloride flush 0.9 % injection 5-40 mL, 5-40 mL, IntraVENous, 2 times per day  sodium chloride flush 0.9 % injection 5-40 mL, 5-40 mL, IntraVENous, PRN  0.9 % sodium chloride infusion, , IntraVENous, PRN  ondansetron (ZOFRAN-ODT) disintegrating tablet 4 mg, 4 mg, Oral, Q8H PRN **OR** ondansetron (ZOFRAN) injection 4 mg, 4 mg, IntraVENous, Q6H PRN  polyethylene glycol (GLYCOLAX) packet 17 g, 17 g, Oral, Daily PRN  acetaminophen (TYLENOL) tablet 650 mg, 650 mg, Oral, Q6H PRN **OR** acetaminophen (TYLENOL) suppository 650 mg, 650 mg, Rectal, Q6H PRN  ferrous sulfate (IRON 325) tablet 325 mg, 325 mg, Oral, Daily with breakfast  mirtazapine (REMERON) tablet 7.5 mg, 7.5 mg, Oral, Nightly  PARoxetine (PAXIL) tablet 20 mg, 20 mg, Oral, Daily  pantoprazole (PROTONIX) tablet 40 mg, 40 mg, Oral, Daily  rosuvastatin (CRESTOR) tablet 10 mg, 10 mg, Oral, Nightly  sodium chloride (OCEAN, BABY AYR) 0.65 % nasal spray 1 spray, 1 spray, Nasal, PRN  heparin (porcine) injection 5,000 Units, 5,000 Units, SubCUTAneous, 3 times per day  labetalol (NORMODYNE;TRANDATE) injection 10 mg, 10 mg, IntraVENous, Q10 Min PRN  hydrALAZINE (APRESOLINE) injection 10 mg, 10 mg, IntraVENous, Q10 Min PRN  Allergies:  Patient has no known allergies. Social History:   TOBACCO:   reports that he quit smoking about 52 years ago.  His smoking extremity:  · Skin examination reveals no superficial lacerations or abrasions. No joint effusion noted. There is increased soft tissue edema to the right forearm when compared to the left forearm. Nonpitting edema. No erythema or ecchymosis appreciated  · Tenderness palpation about the anterior aspect of the shoulder. No tenderness palpation about the clavicle, acromion, arm, elbow, forearm, wrist, hand, fingers  · Sensation intact light touch distally in median, radial, ulnar nerve distributions  · Motor function grossly intact distally in AIN, PIN, median, radial, ulnar nerve distributions. · Compartments soft and compressible  · Palpable radial and ulnar pulses, hand warm well perfused, brisk capillary refill appears    Secondary Exam:   · leftUE: No obvious signs of trauma. -TTP to fingers, hand, wrist, forearm, elbow, humerus, shoulder or clavicle. · leftLE: No obvious signs of trauma. -TTP to foot, ankle, leg, knee, thigh, hip. · Pelvis: -TTP, -Log roll, -Heel strike     DATA:    CBC:   Lab Results   Component Value Date    WBC 13.1 04/06/2022    RBC 3.84 04/06/2022    HGB 11.7 04/06/2022    HCT 36.4 04/06/2022    MCV 94.8 04/06/2022    MCH 30.5 04/06/2022    MCHC 32.1 04/06/2022    RDW 14.9 04/06/2022     04/06/2022    MPV 11.3 04/06/2022     PT/INR:    Lab Results   Component Value Date    PROTIME 14.4 04/03/2022    INR 1.3 04/03/2022       Radiology Review:  XR 2 views right shoulder reviewed demonstrating no acute fracture dislocations. Mild glenohumeral joint arthritis noted. Mild osteopenia noted. No other bony or soft tissue abnormalities noted. 3 views of the right knee reviewed demonstrating no acute fracture dislocations. Mild osteopenia noted. Small joint effusion noted. No appreciable arthritis on these films. No other bony or soft tissue abnormalities noted.     Procedure:  After verbal consent was obtained patient's right knee was prepped in the typical sterile fashion an 18-gauge needle was then advanced into the suprapatellar pouch and approximately 90 cc of noncloudy, bright yellow aspirate was obtained. This was subsequently sent for cell count, Gram stain, culture, crystals, glucose. Patient tolerated procedure well with no change in neurovascular status afterwards      IMPRESSION:  · Right knee effusion, gout versus inflammatory arthritis versus septic arthritis  · Right shoulder pain    PLAN:  · Weightbearing as tolerated right upper and right lower extremity  · Pain control per SICU  · Antibiotics per SICU  · DVT Prophylaxis per SICU  · No acute orthopedic intervention planned, will follow labs  · [id number] Selene Diaz for PT/OT  · All patient/family questions have been answered and patient is currently agreeable to plan. · Discuss with Attending      Electronically signed by Nasra Mahoney DO on 4/6/2022 at 8:45 AM       I have seen and evaluated the patient and agree with the above assessment on today's visit. I have performed the key components of the history and physical examination and concur completely with the findings and plans as documented. Agree with ROS, examination, FMH, PMH, PSH, SocHx, and allergies as above. Agree with above documentation. Patient status post arthrocentesis of the knee with cell count of 22,000. Gram stain has been negative up until this point. Most likely a gout reaction. Appropriate medical management for this. Please call us if he has worsening of symptoms or need for reevaluation.     Past Medical History:   Diagnosis Date    Acute pancreatitis     6/06 AND 11/09    Anxiety     BPH (benign prostatic hyperplasia)     CAD (coronary artery disease)     GERD (gastroesophageal reflux disease)     Hyperlipidemia     Obsessive compulsive disorder     Psoriasis      Past Surgical History:   Procedure Laterality Date    CATARACT REMOVAL Bilateral 08/24/2017    COLONOSCOPY  03/14/2017    THROAT SURGERY      UPPER GASTROINTESTINAL ENDOSCOPY  2017     Family History   Problem Relation Age of Onset   [place] Curl Asthma Mother     Coronary Art Dis Mother     Parkinsonism Father      ,  Social History     Tobacco Use    Smoking status: Former Smoker     Types: Cigarettes     Quit date: 10/24/1969     Years since quittin.4    Smokeless tobacco: Never Used   Substance Use Topics    Alcohol use: Yes     Comment: OCCASIONAL BEER    Drug use: No     No Known Allergies        Physical Examination:   General appearance: alert, well appearing, and in no distress,  normal appearing weight. No visible signs of trauma   Mental status: alert, oriented to person, place, and time, normal mood, behavior, speech, dress, motor activity, and thought processes  Abdomen: soft, nondistended  Resp:   resp easy and unlabored, no audible wheezes note, normal symmetrical expansion of both hemithoraces  Cardiac: distal pulses palpable, skin and extremities well perfused  Neurological: alert, oriented X3, normal speech, no focal findings or movement disorder noted, motor and sensory grossly normal bilaterally, normal muscle tone, no tremors  HEENT: normochephalic atraumatic, external ears and eyes normal, sclera normal, neck supple, no nasal discharge. Extremities:   peripheral pulses normal, no edema, redness or tenderness in the calves   Skin: normal coloration, no rashes or open wounds, no suspicious skin lesions noted  Psych: Affect euthymic   Musculoskeletal:   Extremity:  Agree with above examination. Patient does have pain in the knee but does perform range of motion without extreme pain. ELECTRONICALLY signed by:    Jen Dacosta MD  22   This is been dictated utilizing voice recognition software. All efforts have been made to make the note accurate although inadvertent errors may be present.

## 2022-04-06 NOTE — PROGRESS NOTES
Surgical Intensive Care Unit   Daily Progress Note     Date of admission: 4/2/2022    Reason for ICU: Type B aortic dissection    Pertinent Hospital Course Events:   4/3: Presented to ER for chest pain through to back, found to have type B dissection. Started on nipride and esmolol. Vascular surgery consulted. 4/4: Remains on esmolol and nitroprusside. 4/5: Remains on esmolol and nitroprusside. Nipride had to be increased overnight, increased metoprolol and added norvasc. 4/6: Patient complaining of right knee pain, swollen, warm, limited ROM passively. Arthrocentesis performed by orthopedic surgery. Esmolol down to 50, nipride at 1. CXR concerning for pulmonary edema, added lasix    Overnight Events: No issues overnight    Subjective: No complaints on exam. Appears more confused today     Physical Exam:   /77   Pulse 64   Temp 98.3 °F (36.8 °C) (Temporal)   Resp 19   Ht 5' 8\" (1.727 m)   Wt 169 lb 8 oz (76.9 kg)   SpO2 90%   BMI 25.77 kg/m²     I/O last 3 completed shifts: In: 5372.9 [P.O.:1700; I.V.:2927.3; IV Piggyback:745.7]  Out: 2825 [Urine:2825]  I/O this shift: In: 49.4 [I.V.:49.4]  Out: 680 [Urine:680]    General: No apparent distress, comfortable  HEENT: Trachea midline, no masses, Pupils equal round  Chest: Expiratory wheezes with rales at the bases  Cardiovascular: Heart sounds were normal with a regular rate  Abdomen:  Soft and non distended. No tenderness, guarding, rebound, or rigidity  Extremities: No pedal edema. 2+ peripheral pulses bilaterally. Right knee is warm, swollen, limited ROM passively due to pain      Assessment/Plan:     Neuro: GCS 14, pain control  CV: bradycardic rate, Type B dissection- maintain BP <120, HR <60.  On nipride and esmolol, transition to PO meds- 100 metoprolol TID, Norvasc 10 daily, Lasix 40  Pulm: 2L NC, pulse ox low 90s, cxr shows pulmonary edema, added lasix  GI: regular diet, bowel regimen  Renal: Creatinine within normal limits, UOP remains adequate  ID: afebrile  Endo: glucose near normal range, no acute issues  MSK: no acute issues  Heme: no acute issues, heparin      Code status:  DNR-CCA    Disposition:  Continue ICU monitoring, transition to PO meds.      Electronically signed by Pacheco Joseph MD on 4/6/2022 at 5:28 AM

## 2022-04-06 NOTE — CARE COORDINATION
Pt alert and oriented today. Weaned of nipride drip. Weaning esmolol. Ortho consulted for R knee swelling and discomfort. Sm effusion on xrays. 90 cc fluid aspirated. MARGARITAV DARIN guy [place] and Fatou heller are following. Will need order for PT/OT evals when pt able to advance activity.

## 2022-04-06 NOTE — PROGRESS NOTES
CC: Type B aortic dissection    SIGNIFICANT 24 HOUR EVENTS/NEW COMPLAINTS:  4/3-admitted from ED yesterday with back pain. CT imaging demonstrated contained type B thoracic aortic dissection. Admitted to CVICU and started on intravenous antihypertensives  4/4 remains on esmolol drip at 150 mics per kilo per minute and nitroprusside at 1.5 mics per kilo per minute  4/5 esmolol drip and nitroprusside drip requirements have decreased  4/6 weaned off nitroprusside drip. Complains of swollen right knee    PHYSICAL EXAM:  General -pleasant calm 20-year-old white man  Neuro -awake and alert x3  Head/Face/Neck -no visible mass  CV -normal sinus rhythm  2+ bilateral radial and femoral pulses  Pulm -nonlabored. Abdomen soft nontender nondistended  Ext: right knee swollen      ASSESSMENT:  Type B thoracic aortic dissection  Massive hiatal hernia  Atherosclerotic cardiovascular disease  Acute blood loss anemia  Incidental left inguinal hernia  History of recent Shigella infection    PLAN:  Keep systolic blood pressure less than 125 and heart rate less than 75  Patient needs maximal impulse reduction therapy  Currently on esmolol drip at 100 mics per kilo per minute   We will wean esmolol drip. Lopressor  100 mg 3 times daily and Norvasc 10 mg daily  Will add lisinopril 20 mg daily  General diet  Renal-creatinine stable at 1.0. Continue to monitor urine output and renal function  Swollen right knee--will have orthopedics tap effusion--septic vs inflammatory vs gout    Monitor H&H  SCDs, heparin 3 times daily  DNR-CCA  Continue ICU level care        The patient is at significant risk for life-threatening hemodynamic deterioration and death from his aortic dissection and requires ongoing critical care management.   Because of his aortic dissection, he needs frequent hemodynamic monitoring and titration of drips in order to maximize impulse reduction    Critical care time exclusive of teaching and procedures = 35minutes              Pedro Zarate MD, FACS  4/6/2022  12:12 PM    NOTE: This report was transcribed using voice recognition software. Every effort was made to ensure accuracy; however, inadvertent computerized transcription errors may be present.

## 2022-04-06 NOTE — PROGRESS NOTES
Vascular Surgery Progress Note    Pt is being seen in f/u today regarding Type B aortic dissection    Subjective  Pt s/e.   Denies pain  Denies CP, SOB  Esmolol and nipride gtts continue  No confusion this morning  No issues overnight    Current Medications:    esmolol 100 mcg/kg/min (04/06/22 0544)    nitroprusside (NIPRIDE) 50 mg in D5W infusion 1.5 mcg/kg/min (04/06/22 0700)    sodium chloride        fentanNYL, acetaminophen, traMADol, morphine, ondansetron, sodium chloride flush, sodium chloride, ondansetron **OR** ondansetron, polyethylene glycol, acetaminophen **OR** acetaminophen, sodium chloride, labetalol, hydrALAZINE    sodium phosphate IVPB  30 mmol IntraVENous Once    metoprolol tartrate  100 mg Oral TID    amLODIPine  10 mg Oral Daily    docusate sodium  100 mg Oral BID    sodium chloride flush  5-40 mL IntraVENous 2 times per day    ferrous sulfate  325 mg Oral Daily with breakfast    mirtazapine  7.5 mg Oral Nightly    PARoxetine  20 mg Oral Daily    pantoprazole  40 mg Oral Daily    rosuvastatin  10 mg Oral Nightly    heparin (porcine)  5,000 Units SubCUTAneous 3 times per day      PHYSICAL EXAM:    /71   Pulse 63   Temp 98.3 °F (36.8 °C) (Temporal)   Resp 21   Ht 5' 8\" (1.727 m)   Wt 170 lb 14.4 oz (77.5 kg)   SpO2 95%   BMI 25.99 kg/m²     Intake/Output Summary (Last 24 hours) at 4/6/2022 0847  Last data filed at 4/6/2022 0544  Gross per 24 hour   Intake 2247.65 ml   Output 1605 ml   Net 642.65 ml        Gen: awake, alert, oriented x3, in no apparent distress  CVS: S1, S2  Resp: No increased work of breathing, on 2L nc  Abd: soft, non tender, distended  R LE: Femoral, DP/PT +2   L LE: Femoral, DP/PT +2    LABS:    Lab Results   Component Value Date    WBC 13.1 (H) 04/06/2022    HGB 11.7 (L) 04/06/2022    HCT 36.4 (L) 04/06/2022     04/06/2022    PROTIME 14.4 (H) 04/03/2022    INR 1.3 04/03/2022    APTT 27.1 04/02/2022    K 4.4 04/06/2022    BUN 8 04/06/2022 CREATININE 0.8 04/06/2022     A/P Type B descending thoracic aortic dissection  · Continues on drips to maintain SBP<120  · Wean as able and transition to PO, on metoprolol 100mg TID and amlodipine 10mg daily  · Pain controlled - continue current regimen  · DNR-CCA  · Neurovascular exams  · Appreciate critical care management  · No vascular surgery interventions planned at this time    Greta Jacques, APRN - CNP

## 2022-04-06 NOTE — PROGRESS NOTES
Hospitalist Progress Note      SYNOPSIS: Patient admitted on 2022  for back pain and radiating chest pain. Pt. was found to have to have an aortic dissection. SUBJECTIVE:    Patient seen and examined today, has no complaints but appears unwell. Denies pain. Having loose stools today x 2. Denies abdominal pain, n/v  Records reviewed. Stable overnight. No other overnight issues reported. Temp (24hrs), Av.1 °F (36.7 °C), Min:97.8 °F (36.6 °C), Max:98.6 °F (37 °C)    DIET: ADULT DIET; Regular; Low Sodium (2 gm)  ADULT ORAL NUTRITION SUPPLEMENT; Breakfast, Lunch; Standard High Calorie/High Protein Oral Supplement  ADULT ORAL NUTRITION SUPPLEMENT; Dinner; Frozen Oral Supplement  CODE: [id number]    Intake/Output Summary (Last 24 hours) at 2022 0717  Last data filed at 2022 0544  Gross per 24 hour   Intake 2394.04 ml   Output 1605 ml   Net 789.04 ml       OBJECTIVE:    /71   Pulse 63   Temp 98.3 °F (36.8 °C) (Temporal)   Resp 21   Ht 5' 8\" (1.727 m)   Wt 170 lb 14.4 oz (77.5 kg)   SpO2 95%   BMI 25.99 kg/m²     General appearance: No apparent distress, appears stated age and cooperative. Ill appearence, fragile   HEENT:  Conjunctivae/corneas clear. Neck: Supple. No jugular venous distention. Respiratory: Clear to auscultation bilaterally, normal respiratory effort  Cardiovascular: Regular rate rhythm, normal S1-S2  Abdomen: Soft, nontender, nondistended  Musculoskeletal: No clubbing, cyanosis, no bilateral lower extremity edema. Brisk capillary refill. Skin:  No rashes  on visible skin  Neurologic: awake, alert and oriented x 3,  and following commands     ASSESSMENT and PLAN:    · Type B aortic dissection : Patient transferred to the SICU. Vascular surgery on the case. No surgical intervention at this time. C/w anti-hypertensive mgmt- on PO lopressor 100 mg TID. Analgesics PRN. Telemetry.  Goal BP SBP<120  · HTN: Still on esmolol drip, amlodipine, lasix, lisinopril, lopressor  · GERD: Protonix  · Psoriasis: Patient follows with dermatology OP  ·  Anemia: Monitor patient's Hb/HCT. Patient's HB had dropped- all anticoagulants have been D/c. 18% Iron Sat. Patient has been taking daily iron OP which has been ordered from admission. No further drops in the hb today.    · HLD: C/w crestor  ·  Depression: C/w remeron and paxil. · DVT proph:Scds ordered. · Constipation: colace ordered. miralax prn. · Hypophos: supplemented. · Right knee effusion: s/p drainage. Fluid sent for cell count, Gram stain, culture, crystals, glucose.  Ortho on the case    DISPOSITION:     Medications:  REVIEWED DAILY    Infusion Medications    esmolol 100 mcg/kg/min (04/06/22 0544)    nitroprusside (NIPRIDE) 50 mg in D5W infusion 1.5 mcg/kg/min (04/06/22 0700)    sodium chloride       Scheduled Medications    sodium phosphate IVPB  30 mmol IntraVENous Once    metoprolol tartrate  100 mg Oral TID    amLODIPine  10 mg Oral Daily    docusate sodium  100 mg Oral BID    sodium chloride flush  5-40 mL IntraVENous 2 times per day    ferrous sulfate  325 mg Oral Daily with breakfast    mirtazapine  7.5 mg Oral Nightly    PARoxetine  20 mg Oral Daily    pantoprazole  40 mg Oral Daily    rosuvastatin  10 mg Oral Nightly    heparin (porcine)  5,000 Units SubCUTAneous 3 times per day     PRN Meds: fentanNYL, acetaminophen, traMADol, morphine, ondansetron, sodium chloride flush, sodium chloride, ondansetron **OR** ondansetron, polyethylene glycol, acetaminophen **OR** acetaminophen, sodium chloride, labetalol, hydrALAZINE    Labs:     Recent Labs     04/04/22  0500 04/05/22  0430 04/06/22  0445   WBC 9.1 11.9* 13.1*   HGB 10.2* 11.0* 11.7*   HCT 31.1* 33.8* 36.4*   * 129* 149       Recent Labs     04/04/22  0500 04/05/22  0430 04/06/22  0445    136 134   K 3.7 3.6 4.4    104 102   CO2 25 24 24   BUN 10 9 8   CREATININE 1.0 0.8 0.8   CALCIUM 7.9* 8.2* 8.5*   PHOS 2.2* 2.1* 1.9* Recent Labs     04/04/22  0500 04/05/22  0430 04/06/22  0445   PROT 5.5* 6.1* 6.1*   ALKPHOS 60 60 67   ALT 8 8 10   AST 14 14 21   BILITOT 1.2 1.2 1.2       No results for input(s): INR in the last 72 hours. No results for input(s): Terrence Nordland in the last 72 hours. Chronic labs:    Lab Results   Component Value Date    CHOL 116 04/03/2022    TRIG 69 04/03/2022    HDL 40 04/03/2022    LDLCALC 62 04/03/2022    PSA 1.88 10/21/2015    INR 1.3 04/03/2022       Radiology: REVIEWED DAILY    +++++++++++++++++++++++++++++++++++++++++++++++++  Mario Magaña MD  [facility] - [address] [place] [address]  +++++++++++++++++++++++++++++++++++++++++++++++++  NOTE: This report was transcribed using voice recognition software. Every effort was made to ensure accuracy; however, inadvertent computerized transcription errors may be present.

## 2022-04-06 NOTE — PROGRESS NOTES
Vascular Surgery Progress Note    Pt is being seen in f/u today regarding Type B Aortic Dissection    Subjective  Doing okay this morning. A little less confused compared to yesterday. Still remains on IV infusions to control his blood pressure and heart rate.     Current Medications:    esmolol 100 mcg/kg/min (04/06/22 0544)    nitroprusside (NIPRIDE) 50 mg in D5W infusion 1.5 mcg/kg/min (04/06/22 0700)    sodium chloride        fentanNYL, acetaminophen, traMADol, morphine, ondansetron, sodium chloride flush, sodium chloride, ondansetron **OR** ondansetron, polyethylene glycol, acetaminophen **OR** acetaminophen, sodium chloride, labetalol, hydrALAZINE    sodium phosphate IVPB  30 mmol IntraVENous Once    metoprolol tartrate  100 mg Oral TID    amLODIPine  10 mg Oral Daily    docusate sodium  100 mg Oral BID    sodium chloride flush  5-40 mL IntraVENous 2 times per day    ferrous sulfate  325 mg Oral Daily with breakfast    mirtazapine  7.5 mg Oral Nightly    PARoxetine  20 mg Oral Daily    pantoprazole  40 mg Oral Daily    rosuvastatin  10 mg Oral Nightly    heparin (porcine)  5,000 Units SubCUTAneous 3 times per day        PHYSICAL EXAM:    /71   Pulse 63   Temp 98.3 °F (36.8 °C) (Temporal)   Resp 21   Ht 5' 8\" (1.727 m)   Wt 170 lb 14.4 oz (77.5 kg)   SpO2 95%   BMI 25.99 kg/m²     Intake/Output Summary (Last 24 hours) at 4/6/2022 0740  Last data filed at 4/6/2022 0544  Gross per 24 hour   Intake 2247.65 ml   Output 1605 ml   Net 642.65 ml          Gen: no apparent distress, responds to questions  CVS: rate in the 98-86O, bp with systolic <345   Resp: symmetric chest rise, no audible wheezing  Abd: Soft, non-tender, non-distended  RUE: +2 femoral, DP, PT pulses, motor 5/5 throughout and sensation present to light touch  LUE+2 femoral, DP, PT pulses;  motor 5/5 throughout and sensation present to light touch    LABS:    Lab Results   Component Value Date    WBC 13.1 (H) 04/06/2022 HGB 11.7 (L) 04/06/2022    HCT 36.4 (L) 04/06/2022     04/06/2022    PROTIME 14.4 (H) 04/03/2022    INR 1.3 04/03/2022    APTT 27.1 04/02/2022    K 4.4 04/06/2022    BUN 8 04/06/2022    CREATININE 0.8 04/06/2022       A/P  80 y.o. male with Type B Aortic Dissection. Some confusion this morning.   Reaching goal on IV infusions transitioning to p.o.    - Okay for diet from vascular POV  - Pain control PRN  - Maintain SBP<120 and HR<75  - transition from IV antihypertensives to PO    Carlos Davison MD    Pt seen and examined  More issues with nausea  Still on drips    Donn Kennedy MD

## 2022-04-07 LAB
ALBUMIN SERPL-MCNC: 2.8 G/DL (ref 3.5–5.2)
ALP BLD-CCNC: 74 U/L (ref 40–129)
ALT SERPL-CCNC: 20 U/L (ref 0–40)
ANION GAP SERPL CALCULATED.3IONS-SCNC: 11 MMOL/L (ref 7–16)
AST SERPL-CCNC: 33 U/L (ref 0–39)
BASOPHILS ABSOLUTE: 0.04 E9/L (ref 0–0.2)
BASOPHILS RELATIVE PERCENT: 0.4 % (ref 0–2)
BILIRUB SERPL-MCNC: 1.1 MG/DL (ref 0–1.2)
BUN BLDV-MCNC: 12 MG/DL (ref 6–23)
BURR CELLS: ABNORMAL
CALCIUM SERPL-MCNC: 8.4 MG/DL (ref 8.6–10.2)
CHLORIDE BLD-SCNC: 102 MMOL/L (ref 98–107)
CO2: 25 MMOL/L (ref 22–29)
CREAT SERPL-MCNC: 0.9 MG/DL (ref 0.7–1.2)
EOSINOPHILS ABSOLUTE: 0.3 E9/L (ref 0.05–0.5)
EOSINOPHILS RELATIVE PERCENT: 2.6 % (ref 0–6)
GFR AFRICAN AMERICAN: >60
GFR NON-AFRICAN AMERICAN: >60 ML/MIN/1.73
GLUCOSE BLD-MCNC: 85 MG/DL (ref 74–99)
GRAM STAIN ORDERABLE: NORMAL
HCT VFR BLD CALC: 36.7 % (ref 37–54)
HEMOGLOBIN: 12.3 G/DL (ref 12.5–16.5)
IMMATURE GRANULOCYTES #: 0.04 E9/L
IMMATURE GRANULOCYTES %: 0.4 % (ref 0–5)
LYMPHOCYTES ABSOLUTE: 2.47 E9/L (ref 1.5–4)
LYMPHOCYTES RELATIVE PERCENT: 21.8 % (ref 20–42)
MAGNESIUM: 2.1 MG/DL (ref 1.6–2.6)
MCH RBC QN AUTO: 30.8 PG (ref 26–35)
MCHC RBC AUTO-ENTMCNC: 33.5 % (ref 32–34.5)
MCV RBC AUTO: 92 FL (ref 80–99.9)
MONOCYTES ABSOLUTE: 1.83 E9/L (ref 0.1–0.95)
MONOCYTES RELATIVE PERCENT: 16.2 % (ref 2–12)
NEUTROPHILS ABSOLUTE: 6.65 E9/L (ref 1.8–7.3)
NEUTROPHILS RELATIVE PERCENT: 58.6 % (ref 43–80)
OVALOCYTES: ABNORMAL
PDW BLD-RTO: 15.2 FL (ref 11.5–15)
PHOSPHORUS: 2.6 MG/DL (ref 2.5–4.5)
PLATELET # BLD: 174 E9/L (ref 130–450)
PMV BLD AUTO: 11.2 FL (ref 7–12)
POIKILOCYTES: ABNORMAL
POTASSIUM SERPL-SCNC: 3.5 MMOL/L (ref 3.5–5)
RBC # BLD: 3.99 E12/L (ref 3.8–5.8)
SODIUM BLD-SCNC: 138 MMOL/L (ref 132–146)
TOTAL PROTEIN: 6.3 G/DL (ref 6.4–8.3)
WBC # BLD: 11.3 E9/L (ref 4.5–11.5)

## 2022-04-07 PROCEDURE — 99221 1ST HOSP IP/OBS SF/LOW 40: CPT | Performed by: ORTHOPAEDIC SURGERY

## 2022-04-07 PROCEDURE — 6370000000 HC RX 637 (ALT 250 FOR IP): Performed by: STUDENT IN AN ORGANIZED HEALTH CARE EDUCATION/TRAINING PROGRAM

## 2022-04-07 PROCEDURE — 80053 COMPREHEN METABOLIC PANEL: CPT

## 2022-04-07 PROCEDURE — 6370000000 HC RX 637 (ALT 250 FOR IP): Performed by: INTERNAL MEDICINE

## 2022-04-07 PROCEDURE — 96372 THER/PROPH/DIAG INJ SC/IM: CPT

## 2022-04-07 PROCEDURE — 97535 SELF CARE MNGMENT TRAINING: CPT

## 2022-04-07 PROCEDURE — 6360000002 HC RX W HCPCS: Performed by: STUDENT IN AN ORGANIZED HEALTH CARE EDUCATION/TRAINING PROGRAM

## 2022-04-07 PROCEDURE — 2140000000 HC CCU INTERMEDIATE R&B

## 2022-04-07 PROCEDURE — 97530 THERAPEUTIC ACTIVITIES: CPT

## 2022-04-07 PROCEDURE — 2700000000 HC OXYGEN THERAPY PER DAY

## 2022-04-07 PROCEDURE — 99232 SBSQ HOSP IP/OBS MODERATE 35: CPT

## 2022-04-07 PROCEDURE — 83735 ASSAY OF MAGNESIUM: CPT

## 2022-04-07 PROCEDURE — 2580000003 HC RX 258: Performed by: STUDENT IN AN ORGANIZED HEALTH CARE EDUCATION/TRAINING PROGRAM

## 2022-04-07 PROCEDURE — 2580000003 HC RX 258: Performed by: INTERNAL MEDICINE

## 2022-04-07 PROCEDURE — 84100 ASSAY OF PHOSPHORUS: CPT

## 2022-04-07 PROCEDURE — 85025 COMPLETE CBC W/AUTO DIFF WBC: CPT

## 2022-04-07 PROCEDURE — 97166 OT EVAL MOD COMPLEX 45 MIN: CPT

## 2022-04-07 PROCEDURE — 99232 SBSQ HOSP IP/OBS MODERATE 35: CPT | Performed by: SURGERY

## 2022-04-07 PROCEDURE — 97162 PT EVAL MOD COMPLEX 30 MIN: CPT

## 2022-04-07 RX ORDER — TRAMADOL HYDROCHLORIDE 50 MG/1
25 TABLET ORAL EVERY 6 HOURS PRN
Status: DISCONTINUED | OUTPATIENT
Start: 2022-04-07 | End: 2022-04-09 | Stop reason: HOSPADM

## 2022-04-07 RX ADMIN — FERROUS SULFATE TAB 325 MG (65 MG ELEMENTAL FE) 325 MG: 325 (65 FE) TAB at 08:12

## 2022-04-07 RX ADMIN — Medication 250 MG: at 21:00

## 2022-04-07 RX ADMIN — AMLODIPINE BESYLATE 10 MG: 10 TABLET ORAL at 08:12

## 2022-04-07 RX ADMIN — PANTOPRAZOLE SODIUM 40 MG: 40 TABLET, DELAYED RELEASE ORAL at 05:05

## 2022-04-07 RX ADMIN — METOPROLOL TARTRATE 100 MG: 50 TABLET, FILM COATED ORAL at 08:12

## 2022-04-07 RX ADMIN — HEPARIN SODIUM 5000 UNITS: 10000 INJECTION, SOLUTION INTRAVENOUS; SUBCUTANEOUS at 14:15

## 2022-04-07 RX ADMIN — DOCUSATE SODIUM 100 MG: 100 CAPSULE, LIQUID FILLED ORAL at 08:14

## 2022-04-07 RX ADMIN — HEPARIN SODIUM 5000 UNITS: 10000 INJECTION, SOLUTION INTRAVENOUS; SUBCUTANEOUS at 05:05

## 2022-04-07 RX ADMIN — ROSUVASTATIN CALCIUM 10 MG: 10 TABLET, FILM COATED ORAL at 22:32

## 2022-04-07 RX ADMIN — HEPARIN SODIUM 5000 UNITS: 10000 INJECTION, SOLUTION INTRAVENOUS; SUBCUTANEOUS at 21:05

## 2022-04-07 RX ADMIN — PAROXETINE 20 MG: 20 TABLET, FILM COATED ORAL at 08:11

## 2022-04-07 RX ADMIN — FUROSEMIDE 40 MG: 20 TABLET ORAL at 08:14

## 2022-04-07 RX ADMIN — POTASSIUM BICARBONATE 20 MEQ: 782 TABLET, EFFERVESCENT ORAL at 13:08

## 2022-04-07 RX ADMIN — METOPROLOL TARTRATE 100 MG: 50 TABLET, FILM COATED ORAL at 15:02

## 2022-04-07 RX ADMIN — Medication 250 MG: at 17:08

## 2022-04-07 RX ADMIN — SODIUM CHLORIDE, PRESERVATIVE FREE 10 ML: 5 INJECTION INTRAVENOUS at 21:01

## 2022-04-07 RX ADMIN — LISINOPRIL 20 MG: 20 TABLET ORAL at 08:13

## 2022-04-07 RX ADMIN — Medication 250 MG: at 13:08

## 2022-04-07 RX ADMIN — SODIUM CHLORIDE, PRESERVATIVE FREE 10 ML: 5 INJECTION INTRAVENOUS at 08:15

## 2022-04-07 RX ADMIN — METOPROLOL TARTRATE 100 MG: 50 TABLET, FILM COATED ORAL at 21:00

## 2022-04-07 RX ADMIN — Medication 250 MG: at 08:14

## 2022-04-07 RX ADMIN — MIRTAZAPINE 7.5 MG: 15 TABLET, FILM COATED ORAL at 22:32

## 2022-04-07 ASSESSMENT — PAIN SCALES - GENERAL
PAINLEVEL_OUTOF10: 0

## 2022-04-07 NOTE — PROGRESS NOTES
Hospitalist Progress Note      SYNOPSIS: Patient admitted on 2022  for back pain and radiating chest pain. Pt. was found to have to have an aortic dissection. SUBJECTIVE:    Patient seen and examined. C/o right shoulder pain and decreased ROM. Rt knee pain is improved  No other concerns  Loose stools x 2 today. Records reviewed. Stable overnight. No other overnight issues reported. Temp (24hrs), Av.7 °F (37.1 °C), Min:97.6 °F (36.4 °C), Max:100.8 °F (38.2 °C)    DIET: ADULT DIET; Regular; Low Sodium (2 gm)  ADULT ORAL NUTRITION SUPPLEMENT; Breakfast, Lunch; Standard High Calorie/High Protein Oral Supplement  ADULT ORAL NUTRITION SUPPLEMENT; Dinner; Frozen Oral Supplement  CODE: [id number]    Intake/Output Summary (Last 24 hours) at 2022 075  Last data filed at 2022 0600  Gross per 24 hour   Intake 1220 ml   Output 3100 ml   Net -1880 ml       OBJECTIVE:    /64   Pulse 52   Temp 98.6 °F (37 °C) (Axillary)   Resp 18   Ht 5' 8\" (1.727 m)   Wt 172 lb 10.3 oz (78.3 kg)   SpO2 95%   BMI 26.25 kg/m²     General appearance: No apparent distress, appears stated age and cooperative. Fragile   HEENT:  Conjunctivae/corneas clear. Neck: Supple. No jugular venous distention. Respiratory: Clear to auscultation bilaterally, normal respiratory effort  Cardiovascular: Regular rate rhythm, normal S1-S2  Abdomen: Soft, nontender, nondistended  Musculoskeletal: No clubbing, cyanosis, no bilateral lower extremity edema. Brisk capillary refill. Decreased ROM of right shoulder, limited by pain, tenderness to palpation over the anterior shoulder, no swelling, strong hand    Skin:  No rashes  on visible skin  Neurologic: awake, alert and oriented x 3,  and following commands     ASSESSMENT and PLAN:    · Type B aortic dissection : Patient transferred to the SICU. Vascular surgery on the case. No surgical intervention at this time. C/w anti-hypertensive mgmt- on PO lopressor 100 mg TID. Analgesics PRN. Telemetry. Goal BP SBP<120  · HTN: off esmolol drip, amlodipine, lasix, lisinopril, lopressor  · GERD: Protonix  · Psoriasis: Patient follows with dermatology OP  ·  Anemia: Monitor patient's Hb/HCT. Patient's HB had dropped- all anticoagulants have been D/c. 18% Iron Sat. Patient has been taking daily iron OP which has been ordered from admission. No further drops in the hb today.    · HLD: C/w crestor  ·  Depression: C/w remeron and paxil. · DVT proph:Scds ordered. · Constipation: colace ordered. miralax prn. · Hypophos: supplemented. · Right knee effusion: s/p drainage. Fluid sent for cell count, Gram stain, culture, crystals, glucose. Ortho on the case. PT/OT  · Hx of shigella: states has loss/soft stools for 2 years.  Tested positive for Shigella and [facility] visited his house and he was treated     DISPOSITION:     Medications:  REVIEWED DAILY    Infusion Medications    sodium chloride       Scheduled Medications    furosemide  40 mg Oral Daily    lisinopril  20 mg Oral Daily    potassium & sodium phosphates  1 packet Oral 4x Daily    metoprolol tartrate  100 mg Oral TID    amLODIPine  10 mg Oral Daily    docusate sodium  100 mg Oral BID    sodium chloride flush  5-40 mL IntraVENous 2 times per day    ferrous sulfate  325 mg Oral Daily with breakfast    mirtazapine  7.5 mg Oral Nightly    PARoxetine  20 mg Oral Daily    pantoprazole  40 mg Oral Daily    rosuvastatin  10 mg Oral Nightly    heparin (porcine)  5,000 Units SubCUTAneous 3 times per day     PRN Meds: fentanNYL, hydrALAZINE, labetalol, acetaminophen, traMADol, morphine, ondansetron, sodium chloride flush, sodium chloride, ondansetron **OR** ondansetron, polyethylene glycol, acetaminophen **OR** acetaminophen, sodium chloride    Labs:     Recent Labs     04/05/22  0430 04/06/22  0445 04/07/22  0450   WBC 11.9* 13.1* 11.3   HGB 11.0* 11.7* 12.3*   HCT 33.8* 36.4* 36.7*   * 149 174       Recent Labs 04/05/22 0430 04/06/22 0445 04/07/22  0450    134 138   K 3.6 4.4 3.5    102 102   CO2 24 24 25   BUN 9 8 12   CREATININE 0.8 0.8 0.9   CALCIUM 8.2* 8.5* 8.4*   PHOS 2.1* 1.9* 2.6       Recent Labs     04/05/22  0430 04/06/22 0445 04/07/22  0450   PROT 6.1* 6.1* 6.3*   ALKPHOS 60 67 74   ALT 8 10 20   AST 14 21 33   BILITOT 1.2 1.2 1.1       No results for input(s): INR in the last 72 hours. No results for input(s): Earlis Ronald in the last 72 hours. Chronic labs:    Lab Results   Component Value Date    CHOL 116 04/03/2022    TRIG 69 04/03/2022    HDL 40 04/03/2022    LDLCALC 62 04/03/2022    PSA 1.88 10/21/2015    INR 1.3 04/03/2022       Radiology: REVIEWED DAILY    +++++++++++++++++++++++++++++++++++++++++++++++++  Van Schmidt MD  [facility] - [address], [place]  +++++++++++++++++++++++++++++++++++++++++++++++++  NOTE: This report was transcribed using voice recognition software. Every effort was made to ensure accuracy; however, inadvertent computerized transcription errors may be present.

## 2022-04-07 NOTE — PROGRESS NOTES
Vascular Surgery Progress Note    Pt is being seen in f/u today regarding Type B Aortic Dissection    Subjective  Doing well this morning. Reports nausea is improved. No emesis. No changes in neurovasc exam overnight. Off all IV anti-impulse therapy infusions this morning with goal SBP and HR.     Current Medications:    sodium chloride        fentanNYL, hydrALAZINE, labetalol, acetaminophen, traMADol, morphine, ondansetron, sodium chloride flush, sodium chloride, ondansetron **OR** ondansetron, polyethylene glycol, acetaminophen **OR** acetaminophen, sodium chloride    furosemide  40 mg Oral Daily    lisinopril  20 mg Oral Daily    potassium & sodium phosphates  1 packet Oral 4x Daily    metoprolol tartrate  100 mg Oral TID    amLODIPine  10 mg Oral Daily    docusate sodium  100 mg Oral BID    sodium chloride flush  5-40 mL IntraVENous 2 times per day    ferrous sulfate  325 mg Oral Daily with breakfast    mirtazapine  7.5 mg Oral Nightly    PARoxetine  20 mg Oral Daily    pantoprazole  40 mg Oral Daily    rosuvastatin  10 mg Oral Nightly    heparin (porcine)  5,000 Units SubCUTAneous 3 times per day        PHYSICAL EXAM:    BP (!) 102/55   Pulse 60   Temp 98.6 °F (37 °C) (Axillary)   Resp 18   Ht 5' 8\" (1.727 m)   Wt 172 lb 10.3 oz (78.3 kg)   SpO2 97%   BMI 26.25 kg/m²     Intake/Output Summary (Last 24 hours) at 4/7/2022 3165  Last data filed at 4/7/2022 0600  Gross per 24 hour   Intake 1220 ml   Output 3100 ml   Net -1880 ml          Gen: no apparent distress, responds to questions  CVS: rate in the 94-20M, bp with systolic <511   Resp: symmetric chest rise, no audible wheezing  Abd: Soft, non-tender, non-distended  RUE: +2 femoral, DP, PT pulses, motor 5/5 throughout and sensation present to light touch  LUE+2 femoral, DP, PT pulses;  motor 5/5 throughout and sensation present to light touch    LABS:    Lab Results   Component Value Date    WBC 11.3 04/07/2022    HGB 12.3 (L) 04/07/2022 HCT 36.7 (L) 04/07/2022     04/07/2022    PROTIME 14.4 (H) 04/03/2022    INR 1.3 04/03/2022    APTT 27.1 04/02/2022    K 3.5 04/07/2022    BUN 12 04/07/2022    CREATININE 0.9 04/07/2022       A/P  80 y.o. male with Type B Aortic Dissection.    - Okay for diet from vascular POV  - Pain control PRN  - Maintain SBP<120 and HR<75    On PO medications now controlling SBP and HR to goal for anti-impulse therapy for type B aortic dissection. Doing well.      Clara Porras MD     Pt seen and examined  Doing better  Off drips  Feels weak  Worked with pt    Stanislaw Vickers MD

## 2022-04-07 NOTE — PROGRESS NOTES
[address] [address]                                                               Patient Name: Trisha Ferris  MRN: [id number]  : 1935  Room: [address] [place] [address]-A    Evaluating OT: Juan Ramirez, OTR/L [id number]    Referring Provider: Lisa Han MD   Specific Provider Orders/Date: OT eval and treat (22)       Diagnosis: Hiatal hernia     Aortic Dissection      R knee effusion, R should pain     Reason for admission: presenting with severe chest pain radiating to the back. Initial concern was for possible dissection. Patient had a chest x-ray that indicated large hiatal hernia. Surgery/Procedures: R knee drained     Pertinent Medical History: pancreatitis, Anxiety. CAD, HLD, Psoriasis, OCD       *Precautions:  Fall Risk, WBAT R UE/LE, maintain BP <120,   HR <60    Assessment of current deficits   [x] Functional mobility  [x]ADLs  [x] Strength               []Cognition   [x] Functional transfers   [x] IADLs         [x] Safety Awareness   [x]Endurance   [] Fine Coordination        [x] ROM     [] Vision/perception   []Sensation    []Gross Motor Coordination [x] Balance   [] Delirium                  []Motor Control     [] Communication    OT PLAN OF CARE   OT POC based on physician orders, patient diagnosis and results of clinical assessment.        Frequency/Duration: 1-3 days/wk for 1-2 weeks PRN    Specific OT Treatment to include:   ADL retraining/adapted techniques and AE recommendations to increase functional independence within precautions                    Energy conservation techniques to improve tolerance for selfcare routine   Functional transfer/mobility training/DME recommendations for increased independence, safety and fall prevention         Patient/family education to increase safety and functional independence within precautions Environmental modifications for safe mobility and completion of ADLs                                                     Therapeutic activity to improve functional performance during ADLs/IADLs                                         Therapeutic exercise to improve tolerance and functional strength for ADLs   Balance retraining exercises/tasks for facilitation of postural control with dynamic challenges during ADLs . Positioning to improve functional independence               Delirium prevention/treatment    Recommended Adaptive Equipment: TBA: LB dressing AE pending progress, [employer]     Home Living: Pt lives alone  in a split level home with 1 step(s) to enter and 1 rail(s) to laundry room and half bath + 4  steps up to kitchen and living room area +8 steps/rail up to bed/bath  Bathroom setup: tub/shower with rail and seat; commode rail  Equipment owned: bathroom DME, SPC    Prior Level of Function: Mack with ADLs; IND with IADLs. Recently using SPC for ambulation. Driving: yes  Occupation: [employer]    Pain Level: pt c/o R shoulder pain; does not rate (imaging negative)    Cognition: A&O: 4/4    Follows 1-2 step commands appropriately. Memory: good   Comprehension good   Problem solving: good   Judgement/safety: fair+/good               Communication skills: WFL           Vision: WFL               Glasses:yes                                                   Hearing: WFL     RASS: 0  CAM-ICU: (NT) Delirium    UE Assessment:  Hand Dominance: Right [x]  Left []     ROM Strength STM goal: PRN   RUE  Pt reports h/o R scapular fx; reports new onset on R shoulder pain (limited flexion and rotation); WFL distal  3-/5 proximal  4/5 distal  WFL for ADLS     LUE WFL 4/5        Sensation: No c/o numbness/tingling in extremities.    Tone: WNL   Edema: R knee     Functional Assessment:  AM-PAC Daily Activity Raw Score: 14/24   Initial Eval Status  Date: 4/7 Treatment Status  Date: STGs = LTGs  Time frame: 7-14 days   Feeding S; set up                        IND  while seated up in chair to increase activity tolerance        Grooming Min A                        Mack   while standing sink level requiring AD for balance and demonstrating G tolerance      UB dressing/bathing Mod A                        Mack       LB dressing/bathing Mod A  using reacher & sock aid after instruction                        Mack   using AE as needed for safe reach/ energy conservation       Toileting NT                        Mack     Bed Mobility  Supine to sit:   Min A    Sit to supine:   NT                        Mack  in prep of ADL tasks & transfers   Functional Transfers Sit to stand: Mod A    Stand to sit:   Mod A                        Mack  sit<>stand/functional bathroom transfers using AD/DME as needed for balance and safety   Functional Mobility Mod A SPC  (pivot to chair)  Min A [employer]                       Mack   functional/bathroom mobility using AD as needed & demonstrating G safety     Balance Sitting:     Static:  SBA    Dynamic:Min A  Standing: Mod A [employer]  (posterior LOB)  Mack dynamic sitting balance; Mack dynamic standing balance  during ADL tasks & transfers   Endurance/Activity Tolerance   F tolerance with moderate activity. G   tolerance with moderate activity/self care routine   Visual/  Perceptual   WFL                     Vitals:   HR at rest: 65 bpm HR at end of session: 68 bpm   Spo2 at rest:97% Spo2 at end of session -%   BP at rest:122/62 mmHg BP at end of session 110/64 mmHg       Treatment: OT treatment provided this date includes:  Bed mobility: Instruction on precautions prior to bed mobility to facilitate safe transfers and ADLS. Balance retraining: Performed sitting/standing balance ex's with instruction to facilitate righting reactions with postural changes during ADLS. Pt provided with [employer] for increased support.      ADL retraining: Instruction on adapted techniques/AE(reacher, sock aid) to increase independence and safe reach during dressing/bathing activities. Pt demonstrated fair understanding. Pt can benefit from further training. Functional transfer training:  Instruction on postural cues, hand placement/sequencing, & walker safety  to assist with balance and fall prevention during self care routine/bathroom transfers. Energy conservation: Education on breathing techniques, pacing, work simplification strategies & recommended bathroom DME for safety and energy conservation during self care tasks and activities of daily living. ROM/exercise: Pt instructed on gentle R UE scapular glides to perform bedside. Pt demo G understanding. Delirium Prevention: Environmental and sensory modifications assessed and implemented to decrease ICU acquired delirium and to improve overall orientation, mentation and pt interaction with family/staff. Line management and environmental modifications made prior to and end of session to ensure patient safety and to increase efficiency of session. Skilled monitoring of HR, O2 saturation, blood pressure and patient's response to activity performed throughout session. Comments: OK from RN to see patient. Upon arrival, patient supine in bed, agreeable to session and motivated for OOB activity. Pt demo fair tolerance with good understanding of education/techniques. At end of session, patient left seated in chair to increase activity tolerance. Call light within reach, all lines and tubes intact. Pt instructed on use of call light for assistance and fall prevention. .    Patient presents with decreased ROM/strength,activity tolerance, dynamic balance, functional mobility limiting completion of ADLs and safety. Pt can benefit from continued skilled OT to increase safety, functional independence and quality of life.      Rehab Potential: good for established goals    Patient / Family Goal: to return to PLOF    Patient and/or family were instructed/educated on diagnosis, prognosis/goals and plan of care. Pt demonstrated good understanding. Evaluation Complexity: Moderate     · History: Expanded chart review of consults, imaging, and psychosocial history related to current functional performance. · Exam: 5+ performance deficits identified limiting functional independence and safe return home   · Assistance/Modification: Min/mod assistance or modifications required to perform tasks. May have comorbidities that affect occupational performance. [] Malnutrition indicators have been identified and nursing has been notified to ensure a dietitian consult is ordered. Time In:1335             Time Out: 1405         Total Treatment time: 15   Min Units   OT Eval Low 24651     OT Eval Medium 25963 X    OT Eval High 39837     OT Re-Eval [id number]     Therapeutic Ex 45988     Therapeutic Activities 82373     ADL/Self Care 40565 15 1   Orthotic Management 06604     Neuro Re-Ed 60482     Non-Billable Time        Evaluation time includes thorough review of current medical information, gathering information on past medical history/social history and prior level of function, completion of standardized testing/informal observation of tasks, assessment of data and development of POC/Goals.      Clearance Lisa, OTR/L [id number]

## 2022-04-07 NOTE — PROGRESS NOTES
Vascular Surgery Progress Note    Pt is being seen in f/u today regarding Type B aortic dissection    Subjective  Pt s/e.   Denies pain  Denies SOB, slight chest pain when coughing  Weaned off Esmolol and nipride gtts  No confusion this morning  Nausea has resolved  No issues overnight    Current Medications:    sodium chloride        fentanNYL, hydrALAZINE, labetalol, acetaminophen, traMADol, morphine, ondansetron, sodium chloride flush, sodium chloride, ondansetron **OR** ondansetron, polyethylene glycol, acetaminophen **OR** acetaminophen, sodium chloride    furosemide  40 mg Oral Daily    lisinopril  20 mg Oral Daily    potassium & sodium phosphates  1 packet Oral 4x Daily    metoprolol tartrate  100 mg Oral TID    amLODIPine  10 mg Oral Daily    docusate sodium  100 mg Oral BID    sodium chloride flush  5-40 mL IntraVENous 2 times per day    ferrous sulfate  325 mg Oral Daily with breakfast    mirtazapine  7.5 mg Oral Nightly    PARoxetine  20 mg Oral Daily    pantoprazole  40 mg Oral Daily    rosuvastatin  10 mg Oral Nightly    heparin (porcine)  5,000 Units SubCUTAneous 3 times per day      PHYSICAL EXAM:    /64   Pulse 52   Temp 98.6 °F (37 °C) (Axillary)   Resp 18   Ht 5' 8\" (1.727 m)   Wt 172 lb 10.3 oz (78.3 kg)   SpO2 95%   BMI 26.25 kg/m²     Intake/Output Summary (Last 24 hours) at 4/7/2022 0807  Last data filed at 4/7/2022 0600  Gross per 24 hour   Intake 1220 ml   Output 3100 ml   Net -1880 ml        Gen: awake, alert, oriented x3, in no apparent distress  CVS: S1, S2  Resp: No increased work of breathing, on 2L nc  Abd: soft, non tender, distended  R LE: Femoral, DP/PT +2   L LE: Femoral, DP/PT +2    LABS:    Lab Results   Component Value Date    WBC 11.3 04/07/2022    HGB 12.3 (L) 04/07/2022    HCT 36.7 (L) 04/07/2022     04/07/2022    PROTIME 14.4 (H) 04/03/2022    INR 1.3 04/03/2022    APTT 27.1 04/02/2022    K 3.5 04/07/2022    BUN 12 04/07/2022    CREATININE 0.9 04/07/2022     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

## 2022-04-07 NOTE — PROGRESS NOTES
Department of Orthopedic Surgery  Resident Progress Note    Patient seen and examined. Pain improved today. No new complaints. Denies chest pain, shortness of breath, dizziness/lightheadedness. VITALS:  BP (!) 92/47   Pulse 60   Temp 98.6 °F (37 °C) (Axillary)   Resp 18   Ht 5' 8\" (1.727 m)   Wt 172 lb 10.3 oz (78.3 kg)   SpO2 97%   BMI 26.25 kg/m²     General: in no acute distress and alert    MUSCULOSKELETAL:   right lower extremity:  · Dressing C/D/I  · Mild effusion present  · Compartments soft and compressible  · Calf is soft and nontender  · +PF/DF/EHL  · +2/4 DP & PT pulses, Brisk Cap refill, Toes warm and perfused  · Distal sensation grossly intact to Peroneals, Sural, Saphenous, and tibial nrs    CBC:   Lab Results   Component Value Date    WBC 11.3 04/07/2022    HGB 12.3 04/07/2022    HCT 36.7 04/07/2022     04/07/2022     PT/INR:    Lab Results   Component Value Date    PROTIME 14.4 04/03/2022    INR 1.3 04/03/2022       ASSESSMENT  · Right knee effusion    PLAN      · Continue physical therapy and protocol: WBAT - R LE  · Aspirate labs are negative up to this point, we will continue to await all results. Preliminary cultures negative. · Deep venous thrombosis prophylaxis - per CV[facility] recommendations, early mobilization  · PT/OT  · Pain Control: per CVIC recommendations  · Ok to discharge from orthopedic standpoint. We will continue to follow cultures for final results. Orthopedics will follow the patient peripherally for the remainder of their inpatient stay. Please call with questions or concerns.     Electronically signed by Devon Arechiga DO on 4/7/2022 at 6:12 AM

## 2022-04-07 NOTE — PROGRESS NOTES
CC: Type B aortic dissection    SIGNIFICANT 24 HOUR EVENTS/NEW COMPLAINTS:  4/3-admitted from ED yesterday with back pain. CT imaging demonstrated contained type B thoracic aortic dissection. Admitted to CVICU and started on intravenous antihypertensives  4/4 remains on esmolol drip at 150 mics per kilo per minute and nitroprusside at 1.5 mics per kilo per minute  4/5 esmolol drip and nitroprusside drip requirements have decreased  4/6 weaned off nitroprusside drip. Complains of swollen right knee  4/7 off all drips    PHYSICAL EXAM:  General -pleasant calm 59-year-old white man  Neuro -awake and alert x3  Head/Face/Neck -no visible mass  CV -normal sinus rhythm  2+ bilateral radial and femoral pulses  Pulm -nonlabored. Abdomen soft nontender nondistended  Ext: right knee left swollen      ASSESSMENT:  Type B thoracic aortic dissection  Massive hiatal hernia  Atherosclerotic cardiovascular disease  Acute blood loss anemia  Incidental left inguinal hernia  History of recent Shigella infection    PLAN:  Keep systolic blood pressure less than 125 and heart rate less than 75  Patient needs maximal impulse reduction therapy  Patient has weaned off of esmolol drip    Lopressor  100 mg 3 times daily and Norvasc 10 mg daily    lisinopril 20 mg daily  General diet  Renal-creatinine stable at 0.9. Continue to monitor urine output and renal function  Swollen right knee--status post right knee effusion drainage by Ortho--septic vs inflammatory vs gout    Monitor H&H  SCDs, heparin 3 times daily  DNR-CCA  Okay for transfer out of ICU from critical care standpoint  PT JOSE DANIEL Faustin MD, FACS  4/7/2022  10:47 AM    NOTE: This report was transcribed using voice recognition software. Every effort was made to ensure accuracy; however, inadvertent computerized transcription errors may be present.

## 2022-04-07 NOTE — PROGRESS NOTES
Physical Therapy  Physical Therapy Initial Assessment     Name: Adrian Hernandez  : 1935  MRN: [id number]      Date of Service: 2022    Evaluating PT:  Sonu Waddell PT, DPT [id number]    Room #:  2703/6213-Y  Diagnosis:  Hiatal hernia [K44.9]  Aortic dissection ([address] [I71.00]  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HCC) [I71.03]  PMHx/PSHx:  BPH, CAD, HLD, Psoriasis  Procedure/Surgery:  None  Precautions:  Falls, WBAT RLE s/p R knee effusion, limited R shoulder ROM, O2  Equipment Needs:  TBD    SUBJECTIVE:    Pt lives alone in a 2 story home with 1 stairs to enter and no rail. 4+8 steps and 1 rail to bedroom if entering through garage. Pt ambulated with SPC PRN and was independent PTA. Pt was current with HHPT. OBJECTIVE:   Initial Evaluation  Date: 22 Treatment Short Term/ Long Term   Goals   AM-PAC 6 Clicks 04/10     Was pt agreeable to Eval/treatment? Yes     Does pt have pain? R shoulder pain with ROM - no rating given     Bed Mobility  Rolling: NT  Supine to sit: Lauri with HOB elevated  Sit to supine: NT  Scooting: Lauri  Mod Independent   Transfers Sit to stand: ModA  Stand to sit: ModA  Stand pivot: ModA with SPC; Lauri with [address]  Mod Independent with AAD   Ambulation   60 feet with Lauri with [address]  >150 feet with Mod Independent with AAD   Stair negotiation: ascended and descended NT  >10 steps with 1 rail Mod Independent   ROM BUE:  Defer to OT note  BLE:  WFL     Strength BUE:  Defer to OT note  BLE:  4/5 grossly - slight R knee weakness due to effusion  Increase by 1/3 MMT grade   Balance Sitting EOB:  Lauri dynamic  Dynamic Standing:  Lauri with [address]  Sitting EOB:  Independent  Dynamic Standing:   Mod Independent with AAD     Pt is A & O x  4  CAM-ICU: NT  RASS: 0  Sensation:  No reported paresthesias  Edema:  R knee but improving per pt report    Vitals:  Heart Rate at rest 69 bpm Heart Rate post session 65 bpm   SpO2 at rest 94% SpO2 post session 95%   Blood Pressure at rest 122/62 mmHg Blood Pressure post session 110/64 mmHg       Functional Status Score-Intensive Care Unit (FSS-ICU)   Rolling -/7   Supine to sit transfer 4/7   Unsupported sitting  4/7   Sit to stand transfers 3/7   Ambulation 2/7   Total  13/35       Therapeutic Exercises:  NA    Patient education  Pt educated on safety    Patient response to education:   Pt verbalized understanding Pt demonstrated skill Pt requires further education in this area   x x x     ASSESSMENT:    Conditions Requiring Skilled Therapeutic Intervention:    [x]Decreased strength     []Decreased ROM  [x]Decreased functional mobility  [x]Decreased balance   [x]Decreased endurance   [x]Decreased posture  []Decreased sensation  [x]Decreased coordination   []Decreased vision  [x]Decreased safety awareness   [x]Increased pain       Comments:  RN reported pt was medically stable. Pt was in bed upon arrival, agreeable to initial evaluation. Slight trunk assistance provided for bed mobility. Transfer to chair initially completed with [facility] [place] and pt was moderately unsteady with a posterior lean. After a seated rest break, pt was given a [employer]. Slight posterior lean upon standing still noted but improved. Pt's balance and gait mechanics improved with further ambulation. Fatigue limited activity. Pt was left in chair with all needs met and call light in reach. All lines remained intact. Treatment:  Patient practiced and was instructed in the following treatment:     Bed mobility training - pt given verbal and tactile cues to facilitate proper sequencing and safety during supine>sit as well as provided with physical assistance.  Sitting EOB for >5 minutes for upright tolerance, postural awareness and BLE ROM   Transfer training - pt was given verbal and tactile cues to facilitate proper hand placement, technique and safety during sit to stand, stand to sit and stand pivot transfers as well as provided with physical assistance.    Gait training- pt was given verbal and tactile cues to facilitate safety, balance and use of AD during ambulation as well as provided with physical assistance. Pt's/ family goals   1. Return home    Prognosis is good for reaching above PT goals. Patient and or family understand(s) diagnosis, prognosis, and plan of care. yes    PHYSICAL THERAPY PLAN OF CARE:    PT POC is established based on physician order and patient diagnosis     Referring provider/PT Order:  Andrew James MD /04/07/22 1000 PT eval and treat  Diagnosis:  Hiatal hernia [K44.9]  Aortic dissection ([address] [I71.00]  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[address] [I71.03]  Specific instructions for next treatment:  Progress activity    Current Treatment Recommendations:     [x] Strengthening to improve independence with functional mobility   [] ROM to improve independence with functional mobility   [x] Balance Training to improve static/dynamic balance and to reduce fall risk  [x] Endurance Training to improve activity tolerance during functional mobility   [x] Transfer Training to improve safety and independence with all functional transfers   [x] Gait Training to improve gait mechanics, endurance and asses need for appropriate assistive device  [x] Stair Training in preparation for safe discharge home and/or into the community   [] Positioning to prevent skin breakdown and contractures  [x] Safety and Education Training   [x] Patient/Caregiver Education   [] HEP  [] Other     PT long term treatment goals are located in above grid    Frequency of treatments: 2-5x/week x 1-2 weeks. Time in  1325  Time out  1355    Total Treatment Time  15 minutes     Evaluation Time includes thorough review of current medical information, gathering information on past medical history/social history and prior level of function, completion of standardized testing/informal observation of tasks, assessment of data and education on plan of care and goals.     CPT codes:  [] Low Complexity PT evaluation 73031  [x] Moderate Complexity PT evaluation 57077  [] High Complexity PT evaluation [id number]  [] PT Re-evaluation 68985  [] Gait training 01494 - minutes  [] Manual therapy 14109 - minutes  [x] Therapeutic activities 13187 15 minutes  [] Therapeutic exercises 35738 - minutes  [] Neuromuscular reeducation 95963 - minutes     Juan Chin, PT, DPT  [id number]

## 2022-04-07 NOTE — CARE COORDINATION
Now off  esmolol and nipride drips. R knee feels better today, cultures neg so far. PT/OT ordered today. Andrea Kenny for [employer] [facility] [employer], moy Marin for [employer] following. Facility will be determined pending bed availability when stable for discharge. If able to go home, plan will be [facility]. Would need hhc orders for pt/ot and skilled nursing.

## 2022-04-07 NOTE — PLAN OF CARE
Problem: Pain:  Goal: Pain level will decrease  Description: Pain level will decrease  Outcome: Ongoing  Goal: Control of acute pain  Description: Control of acute pain  Outcome: Ongoing  Goal: Control of chronic pain  Description: Control of chronic pain  Outcome: Ongoing     Problem: Falls - Risk of:  Goal: Will remain free from falls  Description: Will remain free from falls  Outcome: Ongoing  Goal: Absence of physical injury  Description: Absence of physical injury  Outcome: Ongoing     Problem: Cardiac:  Goal: Ability to maintain an adequate cardiac output will improve  Description: Ability to maintain an adequate cardiac output will improve  Outcome: Ongoing  Goal: Hemodynamic stability will improve  Description: Hemodynamic stability will improve  Outcome: Ongoing     Problem: Fluid Volume:  Goal: Ability to achieve and maintain adequate urine output will improve  Description: Ability to achieve and maintain adequate urine output will improve  Outcome: Ongoing     Problem: Respiratory:  Goal: Respiratory status will improve  Description: Respiratory status will improve  Outcome: Ongoing     Problem: Skin Integrity:  Goal: Will show no infection signs and symptoms  Description: Will show no infection signs and symptoms  Outcome: Ongoing  Goal: Absence of new skin breakdown  Description: Absence of new skin breakdown  Outcome: Ongoing

## 2022-04-07 NOTE — PROGRESS NOTES
Surgical Intensive Care Unit   Daily Progress Note     Date of admission: 4/2/2022    Reason for ICU: Type B aortic dissection    Pertinent Hospital Course Events:   4/3: Presented to ER for chest pain through to back, found to have type B dissection. Started on nipride and esmolol. Vascular surgery consulted. 4/4: Remains on esmolol and nitroprusside. 4/5: Remains on esmolol and nitroprusside. Nipride had to be increased overnight, increased metoprolol and added norvasc. 4/6: Patient complaining of right knee pain, swollen, warm, limited ROM passively. Arthrocentesis performed by orthopedic surgery. Esmolol down to 50, nipride at 1. CXR concerning for pulmonary edema, added lasix  4/7: Knee drained yesterday, cell count 22,000, no crystals seen. Patient off drips, well controlled on PO meds. Diuresing well. Subjective: Right knee pain improved today. Physical Exam:   BP (!) 113/57   Pulse 61   Temp 98.5 °F (36.9 °C) (Axillary)   Resp 26   Ht 5' 8\" (1.727 m)   Wt 172 lb 10.3 oz (78.3 kg)   SpO2 98%   BMI 26.25 kg/m²     I/O last 3 completed shifts: In: 1269.4 [P.O.:640; I.V.:99.4; IV Piggyback:530]  Out: 0612 [Urine:3980]  I/O this shift:  In: 10 [I.V.:10]  Out: 650 [Urine:650]    General: No apparent distress, comfortable  HEENT: Trachea midline, no masses, Pupils equal round  Chest: Expiratory wheezes with rales at the bases  Cardiovascular: Heart sounds were normal with a regular rate  Abdomen:  Soft and non distended. No tenderness, guarding, rebound, or rigidity  Extremities: No pedal edema. 2+ peripheral pulses bilaterally. Right knee swelling is improved    Assessment/Plan:      Neuro: GCS 14, pain control   CV: bradycardic rate, Type B dissection- maintain BP <120, HR <60. On metoprolol, norvasc, lasix and lisinopril.     Pulm: 2L NC, pulse ox improved with diuresis   GI: regular diet, bowel regimen   Renal: Creatinine within normal limits, diuresing well with lasix   ID: afebrile  Endo: glucose near normal range, no acute issues   MSK: S/p arthrocentesis 4/6.  Cell count 22,000, no crystals on micro   Heme: no acute issues, heparin      Code status:  DNR-CCA    Disposition:  Okay to transfer to [facility]    Electronically signed by Lovely Madera MD on 4/7/2022 at 12:51 PM

## 2022-04-08 VITALS
SYSTOLIC BLOOD PRESSURE: 123 MMHG | HEART RATE: 76 BPM | TEMPERATURE: 98.5 F | RESPIRATION RATE: 18 BRPM | WEIGHT: 171.4 LBS | HEIGHT: 68 IN | BODY MASS INDEX: 25.98 KG/M2 | OXYGEN SATURATION: 96 % | DIASTOLIC BLOOD PRESSURE: 59 MMHG

## 2022-04-08 LAB
ANION GAP SERPL CALCULATED.3IONS-SCNC: 9 MMOL/L (ref 7–16)
BASOPHILS ABSOLUTE: 0.04 E9/L (ref 0–0.2)
BASOPHILS RELATIVE PERCENT: 0.4 % (ref 0–2)
BUN BLDV-MCNC: 14 MG/DL (ref 6–23)
CALCIUM SERPL-MCNC: 8.2 MG/DL (ref 8.6–10.2)
CHLORIDE BLD-SCNC: 101 MMOL/L (ref 98–107)
CO2: 26 MMOL/L (ref 22–29)
CREAT SERPL-MCNC: 0.8 MG/DL (ref 0.7–1.2)
EOSINOPHILS ABSOLUTE: 0.21 E9/L (ref 0.05–0.5)
EOSINOPHILS RELATIVE PERCENT: 2.2 % (ref 0–6)
GFR AFRICAN AMERICAN: >60
GFR NON-AFRICAN AMERICAN: >60 ML/MIN/1.73
GLUCOSE BLD-MCNC: 85 MG/DL (ref 74–99)
HCT VFR BLD CALC: 36.8 % (ref 37–54)
HEMOGLOBIN: 12 G/DL (ref 12.5–16.5)
IMMATURE GRANULOCYTES #: 0.05 E9/L
IMMATURE GRANULOCYTES %: 0.5 % (ref 0–5)
LYMPHOCYTES ABSOLUTE: 2.11 E9/L (ref 1.5–4)
LYMPHOCYTES RELATIVE PERCENT: 22 % (ref 20–42)
MCH RBC QN AUTO: 30.6 PG (ref 26–35)
MCHC RBC AUTO-ENTMCNC: 32.6 % (ref 32–34.5)
MCV RBC AUTO: 93.9 FL (ref 80–99.9)
MONOCYTES ABSOLUTE: 1.48 E9/L (ref 0.1–0.95)
MONOCYTES RELATIVE PERCENT: 15.4 % (ref 2–12)
NEUTROPHILS ABSOLUTE: 5.72 E9/L (ref 1.8–7.3)
NEUTROPHILS RELATIVE PERCENT: 59.5 % (ref 43–80)
PDW BLD-RTO: 15.2 FL (ref 11.5–15)
PLATELET # BLD: 184 E9/L (ref 130–450)
PMV BLD AUTO: 11 FL (ref 7–12)
POTASSIUM SERPL-SCNC: 3.5 MMOL/L (ref 3.5–5)
RBC # BLD: 3.92 E12/L (ref 3.8–5.8)
SARS-COV-2, NAAT: NOT DETECTED
SODIUM BLD-SCNC: 136 MMOL/L (ref 132–146)
WBC # BLD: 9.6 E9/L (ref 4.5–11.5)

## 2022-04-08 PROCEDURE — 97530 THERAPEUTIC ACTIVITIES: CPT

## 2022-04-08 PROCEDURE — 6370000000 HC RX 637 (ALT 250 FOR IP): Performed by: STUDENT IN AN ORGANIZED HEALTH CARE EDUCATION/TRAINING PROGRAM

## 2022-04-08 PROCEDURE — 6360000002 HC RX W HCPCS: Performed by: STUDENT IN AN ORGANIZED HEALTH CARE EDUCATION/TRAINING PROGRAM

## 2022-04-08 PROCEDURE — 36415 COLL VENOUS BLD VENIPUNCTURE: CPT

## 2022-04-08 PROCEDURE — 97535 SELF CARE MNGMENT TRAINING: CPT

## 2022-04-08 PROCEDURE — 96372 THER/PROPH/DIAG INJ SC/IM: CPT

## 2022-04-08 PROCEDURE — 2700000000 HC OXYGEN THERAPY PER DAY

## 2022-04-08 PROCEDURE — 80048 BASIC METABOLIC PNL TOTAL CA: CPT

## 2022-04-08 PROCEDURE — 99232 SBSQ HOSP IP/OBS MODERATE 35: CPT

## 2022-04-08 PROCEDURE — 85025 COMPLETE CBC W/AUTO DIFF WBC: CPT

## 2022-04-08 PROCEDURE — 2580000003 HC RX 258: Performed by: STUDENT IN AN ORGANIZED HEALTH CARE EDUCATION/TRAINING PROGRAM

## 2022-04-08 PROCEDURE — 87635 SARS-COV-2 COVID-19 AMP PRB: CPT

## 2022-04-08 RX ORDER — AMLODIPINE BESYLATE 10 MG/1
10 TABLET ORAL DAILY
Qty: 30 TABLET | Refills: 0 | Status: SHIPPED | OUTPATIENT
Start: 2022-04-09 | End: 2022-05-23 | Stop reason: SDUPTHER

## 2022-04-08 RX ORDER — LISINOPRIL 20 MG/1
20 TABLET ORAL DAILY
Qty: 30 TABLET | Refills: 3 | Status: SHIPPED | OUTPATIENT
Start: 2022-04-09 | End: 2022-05-23 | Stop reason: SDUPTHER

## 2022-04-08 RX ORDER — METOPROLOL TARTRATE 100 MG/1
100 TABLET ORAL 3 TIMES DAILY
Qty: 60 TABLET | Refills: 0 | Status: ON HOLD | OUTPATIENT
Start: 2022-04-08 | End: 2022-05-03 | Stop reason: DRUGHIGH

## 2022-04-08 RX ORDER — POLYETHYLENE GLYCOL 3350 17 G/17G
17 POWDER, FOR SOLUTION ORAL DAILY PRN
Qty: 527 G | Refills: 0 | Status: SHIPPED | OUTPATIENT
Start: 2022-04-08 | End: 2022-05-08

## 2022-04-08 RX ORDER — TRAMADOL HYDROCHLORIDE 50 MG/1
25 TABLET ORAL EVERY 6 HOURS PRN
Qty: 10 TABLET | Refills: 0 | Status: SHIPPED | OUTPATIENT
Start: 2022-04-08 | End: 2022-04-11

## 2022-04-08 RX ORDER — LIDOCAINE 4 G/G
1 PATCH TOPICAL DAILY
Status: DISCONTINUED | OUTPATIENT
Start: 2022-04-08 | End: 2022-04-09 | Stop reason: HOSPADM

## 2022-04-08 RX ORDER — DOCUSATE SODIUM 100 MG/1
100 CAPSULE, LIQUID FILLED ORAL 2 TIMES DAILY
Qty: 60 CAPSULE | Refills: 0 | Status: SHIPPED | OUTPATIENT
Start: 2022-04-08 | End: 2022-05-08

## 2022-04-08 RX ORDER — FERROUS SULFATE 325(65) MG
325 TABLET ORAL
Qty: 30 TABLET | Refills: 3 | Status: SHIPPED | OUTPATIENT
Start: 2022-04-09 | End: 2022-05-23 | Stop reason: SDUPTHER

## 2022-04-08 RX ORDER — FUROSEMIDE 40 MG/1
40 TABLET ORAL DAILY
Qty: 60 TABLET | Refills: 0 | Status: SHIPPED | OUTPATIENT
Start: 2022-04-09 | End: 2022-05-10 | Stop reason: SDUPTHER

## 2022-04-08 RX ADMIN — METOPROLOL TARTRATE 100 MG: 50 TABLET, FILM COATED ORAL at 15:29

## 2022-04-08 RX ADMIN — METOPROLOL TARTRATE 100 MG: 50 TABLET, FILM COATED ORAL at 09:22

## 2022-04-08 RX ADMIN — LISINOPRIL 20 MG: 20 TABLET ORAL at 09:31

## 2022-04-08 RX ADMIN — SODIUM CHLORIDE, PRESERVATIVE FREE 10 ML: 5 INJECTION INTRAVENOUS at 09:31

## 2022-04-08 RX ADMIN — HEPARIN SODIUM 5000 UNITS: 10000 INJECTION, SOLUTION INTRAVENOUS; SUBCUTANEOUS at 14:39

## 2022-04-08 RX ADMIN — Medication 250 MG: at 09:31

## 2022-04-08 RX ADMIN — HEPARIN SODIUM 5000 UNITS: 10000 INJECTION, SOLUTION INTRAVENOUS; SUBCUTANEOUS at 06:18

## 2022-04-08 RX ADMIN — PAROXETINE 20 MG: 20 TABLET, FILM COATED ORAL at 09:22

## 2022-04-08 RX ADMIN — FERROUS SULFATE TAB 325 MG (65 MG ELEMENTAL FE) 325 MG: 325 (65 FE) TAB at 09:22

## 2022-04-08 RX ADMIN — Medication 250 MG: at 14:30

## 2022-04-08 RX ADMIN — POTASSIUM BICARBONATE 20 MEQ: 782 TABLET, EFFERVESCENT ORAL at 09:31

## 2022-04-08 RX ADMIN — DOCUSATE SODIUM 100 MG: 100 CAPSULE, LIQUID FILLED ORAL at 09:22

## 2022-04-08 RX ADMIN — PANTOPRAZOLE SODIUM 40 MG: 40 TABLET, DELAYED RELEASE ORAL at 09:22

## 2022-04-08 RX ADMIN — AMLODIPINE BESYLATE 10 MG: 10 TABLET ORAL at 09:22

## 2022-04-08 RX ADMIN — ACETAMINOPHEN 650 MG: 325 TABLET ORAL at 15:29

## 2022-04-08 RX ADMIN — FUROSEMIDE 40 MG: 20 TABLET ORAL at 09:31

## 2022-04-08 ASSESSMENT — PAIN SCALES - GENERAL
PAINLEVEL_OUTOF10: 3
PAINLEVEL_OUTOF10: 0
PAINLEVEL_OUTOF10: 0

## 2022-04-08 NOTE — CARE COORDINATION
SOCIAL WORK/CASEMANAGEMENT TRANSITION OF CARE [id number] Kena Guzman, [address], [place], [phone]): I met with pt in the room this a.m. I offered xavier to pt multiple times since his ampac scores were 14. Pt lives alone and has extended family of neices and nephews that assist but they dont check on him daily. We discussed a alert button and I left a name or two of companies on his discharge instructions to follow up on. I called chaney of the [facility] and they need new orders for nsg with PT and OT not just craig. Plan is home and no xavier. MARYSOL Mccauley  4/8/2022  Discharge order in chart. Faxed [facility] orders and called [facility]. MARYSOL Mccauley  4/8/2022  Pt changed his mind and now feels he cannot go home. The four sars he was looking at will not take him or do not have a bed now. Pt and niece are in agreement to [facility]. Rep to assess and will setup  For later today if they accept. Discharge paperwork with hens in place. MARYSOL Mccauley  4/8/2022  Accepted to Pending sale to [facility] and they will send  A van around 6:30 p.m. to take pt.  MARYSOL Mccauley  4/8/2022

## 2022-04-08 NOTE — PROGRESS NOTES
Vascular Surgery Progress Note    Pt is being seen in f/u today regarding Type B aortic dissection    Subjective  Pt s/e.   Denies pain  Denies CP, SOB, has some gas pains  Transferred out of ICU  No issues overnight    Current Medications:    sodium chloride        traMADol, hydrALAZINE, labetalol, sodium chloride flush, sodium chloride, ondansetron **OR** ondansetron, polyethylene glycol, acetaminophen **OR** acetaminophen    potassium bicarb-citric acid  20 mEq Oral Daily    furosemide  40 mg Oral Daily    lisinopril  20 mg Oral Daily    potassium & sodium phosphates  1 packet Oral 4x Daily    metoprolol tartrate  100 mg Oral TID    amLODIPine  10 mg Oral Daily    docusate sodium  100 mg Oral BID    sodium chloride flush  5-40 mL IntraVENous 2 times per day    ferrous sulfate  325 mg Oral Daily with breakfast    mirtazapine  7.5 mg Oral Nightly    PARoxetine  20 mg Oral Daily    pantoprazole  40 mg Oral Daily    rosuvastatin  10 mg Oral Nightly    heparin (porcine)  5,000 Units SubCUTAneous 3 times per day      PHYSICAL EXAM:    /68   Pulse 75   Temp 97.8 °F (36.6 °C) (Oral)   Resp 16   Ht 5' 8\" (1.727 m)   Wt 171 lb 6.4 oz (77.7 kg)   SpO2 95%   BMI 26.06 kg/m²     Intake/Output Summary (Last 24 hours) at 4/8/2022 1123  Last data filed at 4/8/2022 0448  Gross per 24 hour   Intake --   Output 580 ml   Net -580 ml        Gen: awake, alert, oriented x3, in no apparent distress  CVS: S1, S2  Resp: No increased work of breathing, on 2L nc  Abd: soft, non tender, distended  R LE: Femoral, DP/PT +2   L LE: Femoral, DP/PT +2    LABS:    Lab Results   Component Value Date    WBC 9.6 04/08/2022    HGB 12.0 (L) 04/08/2022    HCT 36.8 (L) 04/08/2022     04/08/2022    PROTIME 14.4 (H) 04/03/2022    INR 1.3 04/03/2022    APTT 27.1 04/02/2022    K 3.5 04/08/2022    BUN 14 04/08/2022    CREATININE 0.8 04/08/2022     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 Brandi Chow in the office  · Discharge planning - patient says he's going home with [address], APRN - CNP

## 2022-04-08 NOTE — DISCHARGE SUMMARY
Hospitalist Discharge Summary    Patient ID: Michael Terrazas   Patient : 1935  Patient's PCP: Angel Kilpatrick DO    Admit Date: 2022   Admitting Physician: Jerson Yousif MD    Discharge Date:  2022   Discharge Physician: Peterson Anand MD   Discharge Condition: Stable  Discharge Disposition: Home with [facility] course in brief:  (Please refer to daily progress notes for a comprehensive review of the hospitalization by requesting medical records)    Patient presented with back pain and was diagnosed with Type B aortic dissection. Patient transferred to the SICU. Vascular surgery on the case. No surgical intervention at this time. Placed on multiple anti-hypertensive regimen and titrated for response with goal SBP<120. Patient with no complication related to this and controlled BP was DC home with HHA on stable condition with recommendations to follow vascular surgery in a month for plan  for follow-up in 1 month with CTA of the abdomen pelvis for evaluation of his type B aortic dissection    Exam:    General appearance: No apparent distress, appears stated age and cooperative. Fragile   HEENT:  Conjunctivae/corneas clear. Neck: Supple. No jugular venous distention. Respiratory: Clear to auscultation bilaterally, normal respiratory effort  Cardiovascular: Regular rate rhythm, normal S1-S2  Abdomen: Soft, nontender, nondistended  Musculoskeletal: No clubbing, cyanosis, no bilateral lower extremity edema. Brisk capillary refill.  Decreased ROM of right shoulder, limited by pain, tenderness to palpation over the anterior shoulder, no swelling, strong hand    Skin:  No rashes  on visible skin  Neurologic: awake, alert and oriented x 3,  and following commands        Consults:   IP CONSULT TO VASCULAR SURGERY  IP CONSULT TO INTERNAL MEDICINE  IP CONSULT TO CRITICAL CARE  IP CONSULT TO DIETITIAN  IP CONSULT TO ORTHOPEDIC SURGERY  IP CONSULT TO HOME CARE NEEDS    Discharge Diagnoses:      · Type B aortic dissection : Goal BP SBP<120  · HTN  · GERD  · Psoriasis  ·  Anemia   · HLD  ·  Depression  · DVT proph  · Constipation  · Hypophos  · Right knee effusion: s/p drainage  · Hx of shigella: states has loss/soft stools for 2 years. Tested positive for Shigella and RADHA visited his house and he was treated       Discharge Instructions / Follow up:    Future Appointments   Date Time Provider Fatou Isabelisti   4/22/2022 11:15 AM DO SANJANA Gonzales [facility]   4/25/2022 10:15 AM Tasha Tineo MD [facility]   8/8/2022 11:15 AM Anibal Zaidi DO [facility] SANJANA Tabares appropriate risk factor modification of blood pressure, diabetes and serum lipids will remain essential to reducing risk of future atherosclerotic development    Activity: activity as tolerated    Significant labs:  CBC:   Recent Labs     04/06/22 0445 04/07/22  0450 04/08/22  0656   WBC 13.1* 11.3 9.6   RBC 3.84 3.99 3.92   HGB 11.7* 12.3* 12.0*   HCT 36.4* 36.7* 36.8*   MCV 94.8 92.0 93.9   RDW 14.9 15.2* 15.2*    174 184     BMP:   Recent Labs     04/06/22 0445 04/07/22  0450 04/08/22  0656    138 136   K 4.4 3.5 3.5    102 101   CO2 24 25 26   BUN 8 12 14   CREATININE 0.8 0.9 0.8   MG 2.1 2.1  --    PHOS 1.9* 2.6  --      LFT:  Recent Labs     04/06/22 0445 04/07/22  0450   PROT 6.1* 6.3*   ALKPHOS 67 74   ALT 10 20   AST 21 33   BILITOT 1.2 1.1     PT/INR: No results for input(s): INR, APTT in the last 72 hours. BNP: No results for input(s): BNP in the last 72 hours.   Hgb A1C: No results found for: LABA1C  Folate and B12:   Lab Results   Component Value Date    FHGAZTXY29 548 04/03/2022   , No results found for: FOLATE  Thyroid Studies: No results found for: TSH, T9OIKXO, A0RJOAM, THYROIDAB    Urinalysis:    Lab Results   Component Value Date    NITRU Negative 05/16/2019    BLOODU Negative 05/16/2019    SPECGRAV <=1.005 05/16/2019 GLUCOSEU Negative 05/16/2019       Imaging:  XR SHOULDER RIGHT (MIN 2 VIEWS)    Result Date: 4/6/2022  EXAMINATION: THREE XRAY VIEWS OF THE RIGHT SHOULDER 4/6/2022 7:55 am COMPARISON: None. HISTORY: ORDERING SYSTEM PROVIDED HISTORY: Right shoulder pain, unspecified chronicity TECHNOLOGIST PROVIDED HISTORY: Reason for exam:->Right shoulder pain, unspecified chronicity What reading provider will be dictating this exam?->CRC FINDINGS: No significant arthropathy. No fracture or dislocation. Normal soft tissues. No significant abnormal findings. XR KNEE RIGHT (3 VIEWS)    Result Date: 4/6/2022  EXAMINATION: THREE XRAY VIEWS OF THE RIGHT KNEE 4/6/2022 7:49 am COMPARISON: None. HISTORY: ORDERING SYSTEM PROVIDED HISTORY: effusion TECHNOLOGIST PROVIDED HISTORY: Reason for exam:->effusion What reading provider will be dictating this exam?->CRC FINDINGS: Nonspecific soft tissue calcification above the patella may relate to chronic synovitis or possibly even synovial chondroid metaplasia. No fracture or dislocation. Joint spaces are relatively preserved. Soft tissue calcification above the patella is nonspecific but could relate to chronic synovitis or even synovial chondroid metaplasia. XR CHEST PORTABLE    Result Date: 4/6/2022  EXAMINATION: ONE XRAY VIEW OF THE CHEST 4/6/2022 6:50 am COMPARISON: 04/04/2022 HISTORY: ORDERING SYSTEM PROVIDED HISTORY: eval fluid overload TECHNOLOGIST PROVIDED HISTORY: Reason for exam:->eval fluid overload What reading provider will be dictating this exam?->CRC FINDINGS: EKG leads overlie the chest.  Cardiac silhouette remains enlarged. Scattered vascular calcifications. No pneumothorax. Interstitial and mixed patchy airspace opacities are new. Small left pleural effusion is suspected. Patchy mixed infiltrates concerning for mild edema. Pneumonia less likely. Continued follow-up recommended.      XR CHEST PORTABLE    Result Date: 4/4/2022  EXAMINATION: ONE XRAY VIEW OF THE CHEST 4/4/2022 7:50 pm COMPARISON: Chest series from April 2, 2022 HISTORY: ORDERING SYSTEM PROVIDED HISTORY: Mild wheeze TECHNOLOGIST PROVIDED HISTORY: Reason for exam:->Mild wheeze What reading provider will be dictating this exam?->CRC FINDINGS: Atherosclerotic disease and cardiomegaly. Mild central pulmonary vascular congestion. Lucency in the retrocardiac region consistent with large hiatal hernia. Background lung hyperinflation with coarse interstitial markings and bilateral peripheral reticular opacities. Bibasilar opacities appear unchanged. No new airspace disease, pleural effusions, or pneumothoraces. Osseous mineralization is decreased. There are no acute osseous or soft tissue findings about the thorax. Stable chest series. No new cardiopulmonary pathology. XR CHEST PORTABLE    Result Date: 4/2/2022  EXAMINATION: ONE XRAY VIEW OF THE CHEST 4/2/2022 3:37 pm COMPARISON: 03/12/2021 HISTORY: ORDERING SYSTEM PROVIDED HISTORY: cp TECHNOLOGIST PROVIDED HISTORY: Reason for exam:->cp What reading provider will be dictating this exam?->CRC FINDINGS: The cardiac silhouette is within normals. There is a large hiatal hernia. There is no right or left lung infiltrate. Mild interstitial fibrotic changes are noted which are confirmed on the prior CT scan of 03/12/2021. There is no pleural effusion. 1. There are no findings of failure or pneumonia 2. Mild interstitial fibrotic changes seen within the lung bases 3. Large hiatal hernia     CTA CHEST W CONTRAST    Result Date: 4/2/2022  EXAMINATION: CTA OF THE CHEST 4/2/2022 3:56 pm TECHNIQUE: CTA of the chest was performed after the administration of intravenous contrast.  Multiplanar reformatted images are provided for review. MIP images are provided for review. Dose modulation, iterative reconstruction, and/or weight based adjustment of the mA/kV was utilized to reduce the radiation dose to as low as reasonably achievable. COMPARISON: None. HISTORY: ORDERING SYSTEM PROVIDED HISTORY: chest pain TECHNOLOGIST PROVIDED HISTORY: Reason for exam:->chest pain Decision Support Exception - unselect if not a suspected or confirmed emergency medical condition->Emergency Medical Condition (MA) What reading provider will be dictating this exam?->CRC FINDINGS: There is a Yosvany B intramural hematoma of the thoracic aorta starting just distal to the left subclavian vein, extending inferiorly to the level of the aortic hiatus, just proximal to the celiac axis/most upper abdominal aorta, it extends for a length of 26 cm. There is a focal point of intimal rupture which causes the hematoma formation located approximately 18 cm distal to the left subclavian artery, located in the left posterior wall of the descending thoracic aorta located at 05 o'clock topography, measuring approximately 1.2 cm in size. There is no intimal flap associated with the intramural hematoma. The point of intimal ruptured causing the intramural hematoma dissecting throughout the descending thoracic aorta is seen on axial images [id number], and sagittal oblique images A7/[id number]. There is no evidence for intramural hematoma or dissection in the ascending aorta and in the arch of the aorta up to the level of the left subclavian vein. Atherosclerotic changes are seen throughout the arch of the aorta and throughout the descending aorta and visualized upper abdominal aorta. Diameter for the ascending aorta is 3.7 cm x 3.8 cm. The diameter for the mid arch of the aorta is about 3.1 cm. The diameter for the descending aorta with the intramural dissection is 5.1 x 3.6 cm in the upper segment and the 3.6 x 3.4 cm in the distal segment. The heart is normal size. The inner diameter for the left ventricular cavity is 4.6 cm and for the right ventricular cavities 3.8 cm. The heart is normal size. The calcifications are seen in the coronary arteries. There is no pericardial effusion.   No mediastinal HISTORY: ORDERING SYSTEM PROVIDED HISTORY: abdominal pain, concern for dissection TECHNOLOGIST PROVIDED HISTORY: Reason for exam:->abdominal pain, concern for dissection Decision Support Exception - unselect if not a suspected or confirmed emergency medical condition->Emergency Medical Condition (MA) What reading provider will be dictating this exam?->CRC FINDINGS: CTA ABDOMEN/PELVIS: This study is a continuation of the CTA chest. There is no continuation of the intramural hematoma dissecting along the descending thoracic aorta into the upper abdominal aorta. There is no intimal flap in the abdominal aorta. Calcified atheromatous changes are seen throughout the abdominal aorta with a aneurysm formation in the distal segment proximal to the bifurcation measuring 2.5 by 2.5 cm. Calcified atheromatous changes continues from the abdominal aorta into the iliac vessels with fusiform dilatation of both common iliac arteries up to 1.5 cm in the more distal left common iliac artery and up to 1.3 cm in the left common iliac artery. There is good the contrast runoff into visualized proximal right and left superficial and profunda femoral arteries. CELIAC AXIS: There is a moderate to severe anatomic stenosis the at the proximal segment caused by the arcuate ligament. Calcified plaques are seen causing moderate anatomic stenosis in the main trunk of the celiac axis. Additional calcified plaques are seen in the left the splenic artery which demonstrate tortuosity. SMA: There is no stenosis in the origin of the vessel. There is good the contrast enhancement for the proximal SMA main trunk. However, there is a focal saccular aneurysm formation of the more distal SMA main trunk, measuring up to 13 x 8 mm with a form of focal mural dissection.  CANDELARIA: There are calcified plaques at the origin of the vessel, there is at least a moderate anatomic stenosis in the proximal segment of the CANDELARIA but there is good contrast runoff through the vessel. RIGHT RENAL ARTERY: Calcified plaques are seen in the origin of the vessel but there is no anatomic stenosis. LEFT RENAL ARTERY: Calcified and non calcified plaques are seen in the origin of the vessel with moderate to severe anatomic stenosis present. CT ABDOMEN and PELVIS: There are normal size and density and arterial enhancement for the liver. No conspicuous focal lesions are seen. The gallbladder is normally distended. The biliary tree and pancreatic duct systems are not dilated. There is normal size density and arterial enhancement for the pancreas. No focal lesions are seen. The pancreatic duct is not dilated. The spleen has normal size and the arterial enhancement. Adrenals not enlarged. Kidneys are preserved size and cortical thickness and parenchymal enhancement. The there is no renal calculus. There is no obstructive uropathy. The bladder has unremarkable appearance. The prostate gland is enlarged impresses and elevates the base of the bladder. The prostate measures 6.3 by 5 by 5.2 cm. The seminal vesicles symmetrical size. There is a left inguinal hernia with herniation of the proximal sigmoid colon without evidence for incarceration. The hernia sac measures 7.5 by 2.8 by 2.2 cm. There is no acute inflammatory changes in the omental/mesenteric fat planes, free intraperitoneal air, ascites, or indication for bowel obstruction. There is no ascites. There is no signs for diverticulitis. The appendix is identified and has unremarkable appearance. There is no midline retroperitoneal adenopathy. There is no mesenteric adenopathy. For findings in the lower lung base see report of CT chest performed same location. Advanced degenerative changes are seen in the L5-S1 level of the lumbar spine with bilateral spondylolysis of L5 pars interarticularis with advanced degenerative disc disease at L5-S1 with grade 1/2 anterolisthesis of L5 relation to S1 which are longstanding chronic findings. 1.  No continuation of the intramural hematoma/intramural dissection of the descending thoracic aorta into the abdominal aorta. See report of the CTA chest performed the same location. 2.  Atherosclerotic changes throughout the abdominal aorta and the mesenteric vessels and renal arteries and iliac arteries as above commented the. The 3. 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 Ectasia tortuous of the common iliac arteries. No aortic occlusive process. 4.  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 5.  Moderate-to-severe anatomic stenosis in the origin of the left renal artery by calcified and noncalcified plaques. 6.  Large size prostate gland impressing the base of the bladder. 7.  Left inguinal hernia with herniation of the proximal sigmoid colon without signs for obstruction or incarceration. 8.  No indication for acute intraperitoneal retroperitoneal process in the abdomen or in the pelvis. Discharge Medications:      Medication List      START taking these medications    amLODIPine 10 MG tablet  Commonly known as: NORVASC  Take 1 tablet by mouth daily  Start taking on: April 9, 2022     docusate sodium 100 MG capsule  Commonly known as: COLACE  Take 1 capsule by mouth 2 times daily     furosemide 40 MG tablet  Commonly known as: LASIX  Take 1 tablet by mouth daily  Start taking on: April 9, 2022     metoprolol 100 MG tablet  Commonly known as: LOPRESSOR  Take 1 tablet by mouth in the morning, at noon, and at bedtime     polyethylene glycol 17 g packet  Commonly known as: GLYCOLAX  Take 17 g by mouth daily as needed for Constipation     traMADol 50 MG tablet  Commonly known as: ULTRAM  Take 0.5 tablets by mouth every 6 hours as needed for Pain for up to 3 days.         CHANGE how you take these medications    ferrous sulfate 325 (65 Fe) MG tablet  Commonly known as: IRON 325  Take 1 tablet by mouth daily (with breakfast)  Start taking on: April 9, 2022  What changed:   · medication strength  · how much to take  · when to take this     lisinopril 20 MG tablet  Commonly known as: PRINIVIL;ZESTRIL  Take 1 tablet by mouth daily  Start taking on: April 9, 2022  What changed:   · medication strength  · how much to take        CONTINUE taking these medications    ACIDOPHILUS LACTOBACILLUS PO     colesevelam 625 MG tablet  Commonly known as: WELCHOL  Take 1 tablet by mouth 2 times daily (with meals)     mirtazapine 7.5 MG tablet  Commonly known as: REMERON  Take 1 tablet by mouth nightly     PARoxetine 20 MG tablet  Commonly known as: PAXIL  TAKE 1 TABLET BY MOUTH ONCE DAILY     PEPTO-BISMOL PO     rosuvastatin 10 MG tablet  Commonly known as: CRESTOR  TAKE 1 TABLET BY MOUTH AT  NIGHT     VITEYES AREDS FORMULA/LUTEIN PO        STOP taking these medications    clobetasol 0.05 % ointment  Commonly known as: TEMOVATE     fluocinonide 0.05 % external solution  Commonly known as: LIDEX     hydrocortisone 2.5 % cream     ketoconazole 2 % cream  Commonly known as: NIZORAL     ketoconazole 2 % shampoo  Commonly known as: NIZORAL     NEXIUM 24HR PO     Remedy Phytoplex Antifungal 2 % powder  Generic drug: miconazole     sodium chloride 0.65 % nasal spray  Commonly known as: OCEAN, BABY AYR     triamcinolone 0.1 % cream  Commonly known as: KENALOG           Where to Get Your Medications      These medications were sent to [facility], [address] [address]    Phone: [phone]   · amLODIPine 10 MG tablet  · docusate sodium 100 MG capsule  · ferrous sulfate 325 (65 Fe) MG tablet  · furosemide 40 MG tablet  · lisinopril 20 MG tablet  · metoprolol 100 MG tablet  · polyethylene glycol 17 g packet     You can get these medications from any pharmacy    Bring a paper prescription for each of these medications  · traMADol 50 MG tablet         Time Spent on discharge is more than 45 minutes in the examination, evaluation, counseling and review of medications and discharge plan.    +++++++++++++++++++++++++++++++++++++++++++++++++  Andres Ayers MD  [facility] - [address] [place] [address]  +++++++++++++++++++++++++++++++++++++++++++++++++  NOTE: This report was transcribed using voice recognition software. Every effort was made to ensure accuracy; however, inadvertent computerized transcription errors may be present.

## 2022-04-08 NOTE — PROGRESS NOTES
Vascular Surgery Progress Note    Pt is being seen in f/u today regarding Type B Aortic Dissection    Subjective  Doing well this morning. Transfer out of the CVICU yesterday. No changes in neurovasc exam overnight. On p.o. medications to control his blood pressure and heart rate.     Current Medications:    sodium chloride        traMADol, hydrALAZINE, labetalol, sodium chloride flush, sodium chloride, ondansetron **OR** ondansetron, polyethylene glycol, acetaminophen **OR** acetaminophen    potassium bicarb-citric acid  20 mEq Oral Daily    furosemide  40 mg Oral Daily    lisinopril  20 mg Oral Daily    potassium & sodium phosphates  1 packet Oral 4x Daily    metoprolol tartrate  100 mg Oral TID    amLODIPine  10 mg Oral Daily    docusate sodium  100 mg Oral BID    sodium chloride flush  5-40 mL IntraVENous 2 times per day    ferrous sulfate  325 mg Oral Daily with breakfast    mirtazapine  7.5 mg Oral Nightly    PARoxetine  20 mg Oral Daily    pantoprazole  40 mg Oral Daily    rosuvastatin  10 mg Oral Nightly    heparin (porcine)  5,000 Units SubCUTAneous 3 times per day        PHYSICAL EXAM:    /70   Pulse 64   Temp 98 °F (36.7 °C) (Oral)   Resp 18   Ht 5' 8\" (1.727 m)   Wt 171 lb 6.4 oz (77.7 kg)   SpO2 95%   BMI 26.06 kg/m²     Intake/Output Summary (Last 24 hours) at 4/8/2022 0719  Last data filed at 4/8/2022 0448  Gross per 24 hour   Intake 730 ml   Output 1230 ml   Net -500 ml          Gen: no apparent distress, responds to questions  CVS: rate in the 85-30Y, bp with systolic <314   Resp: symmetric chest rise, no audible wheezing  Abd: Soft, non-tender, non-distended  RUE: +2 femoral, DP, PT pulses, motor 5/5 throughout and sensation present to light touch  LUE+2 femoral, DP, PT pulses;  motor 5/5 throughout and sensation present to light touch    LABS:    Lab Results   Component Value Date    WBC 11.3 04/07/2022    HGB 12.3 (L) 04/07/2022    HCT 36.7 (L) 04/07/2022     04/07/2022    PROTIME 14.4 (H) 04/03/2022    INR 1.3 04/03/2022    APTT 27.1 04/02/2022    K 3.5 04/07/2022    BUN 12 04/07/2022    CREATININE 0.9 04/07/2022       A/P  80 y.o. male with Type B Aortic Dissection. On PO medications now controlling SBP and HR to goal for anti-impulse therapy for type B aortic dissection. Doing well. Plan for follow-up in 1 month with CTA of the abdomen pelvis for evaluation of his type B aortic dissection.   Will place follow-up orders    Catherine Mckeon MD

## 2022-04-08 NOTE — DISCHARGE INSTR - COC
Continuity of Care Form    Patient Name: Jass Plaza   :  1935  MRN:  [id number]    Admit date:  2022  Discharge date:  22    Code Status Order: DNR-CCA   Advance Directives:      Admitting Physician:  Kassie Cosme MD  PCP: Mookie Hidalgo DO    Discharging Nurse: Royce Durham Unit/Room#: 6799/3904-B  Discharging Unit Phone Number: [phone]    Emergency Contact:   Extended Emergency Contact Information  Primary Emergency Contact: octavio hector  Home Phone: [phone]  Relation: Niece/Nephew   needed?  No  Secondary Emergency Contact: Stocard  Mobile Phone: [phone]  Relation: Niece/Nephew    Past Surgical History:  Past Surgical History:   Procedure Laterality Date    CATARACT REMOVAL Bilateral 2017    COLONOSCOPY  2017    THROAT SURGERY      UPPER GASTROINTESTINAL ENDOSCOPY  2017       Immunization History:   Immunization History   Administered Date(s) Administered    COVID-19, Barbi Ivey, Primary or Immunocompromised, PF, 100mcg/0.5mL 2021, 2021, 10/29/2021    Influenza Virus Vaccine 2015    Influenza, High Dose (Fluzone 65 yrs and older) 10/24/2017, 2018, 2019, 10/04/2021    Influenza, High-dose, Rasheed Starch, 65 yrs +, IM (Fluzone) 10/24/2020    PPD Test 2020    Pneumococcal Conjugate 13-valent (Odvivka52) 2015    Pneumococcal Polysaccharide (Leieiusez10) 2015    Td, unspecified formulation 2017       Active Problems:  Patient Active Problem List   Diagnosis Code    Psoriasis L40.9    BPH (benign prostatic hyperplasia) N40.0    CAD (coronary artery disease) I25.10    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    Essential hypertension I10    Irritable bowel syndrome with diarrhea K58.0    Hiatal hernia with GERD K21.9, K44.9    Descending thoracic aortic dissection (HCC) I71.01    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HCC) I71.03       Isolation/Infection:   Isolation            No Isolation          Patient Infection Status       Infection Onset Added Last Indicated Last Indicated By Review Planned Expiration Resolved Resolved By    None active    Resolved    COVID-19 (Rule Out) 04/02/22 04/02/22 04/02/22 COVID-19, Rapid (Ordered)   04/02/22 Rule-Out Test Resulted    COVID-19 (Rule Out) 03/12/21 03/12/21 03/12/21 COVID-19, Rapid (Ordered)   03/12/21 Rule-Out Test Resulted            Nurse Assessment:  Last Vital Signs: BP (!) 123/59   Pulse 76   Temp 98.5 °F (36.9 °C) (Temporal)   Resp 18   Ht 5' 8\" (1.727 m)   Wt 171 lb 6.4 oz (77.7 kg)   SpO2 95%   BMI 26.06 kg/m²     Last documented pain score (0-10 scale): Pain Level: 3  Last Weight:   Wt Readings from Last 1 Encounters:   04/08/22 171 lb 6.4 oz (77.7 kg)     Mental Status:  oriented and alert    IV Access:  - None    Nursing Mobility/ADLs:  Walking   Assisted  Transfer  Assisted  Bathing  Assisted  Dressing  Assisted  Toileting  Assisted  Feeding  Independent  Med Admin  Independent  Med Delivery   whole    Wound Care Documentation and Therapy:  Wound 04/05/22 Elbow Anterior; Left (Active)   Wound Etiology Skin Tear 04/08/22 0800   Dressing Status Clean;Dry; Intact 04/08/22 0800   Wound Cleansed Cleansed with saline 04/06/22 1000   Dressing/Treatment Non adherent 04/08/22 0800   Wound Assessment Bleeding 04/06/22 1000   Drainage Amount None 04/07/22 1800   Elena-wound Assessment Blanchable erythema 04/07/22 1800   Number of days: 2        Elimination:  Continence: Bowel: Yes  Bladder: Yes  Urinary Catheter: None   Colostomy/Ileostomy/Ileal Conduit: No       Date of Last BM: 4/8/22    Intake/Output Summary (Last 24 hours) at 4/8/2022 1555  Last data filed at 4/8/2022 1403  Gross per 24 hour   Intake 60 ml   Output 1030 ml   Net -970 ml     I/O last 3 completed shifts:   In: 1000 [P.O.:990; I.V.:10]  Out: 2030 [Urine:2030]    Safety Concerns:     History of Falls (last 30 days)    Impairments/Disabilities:      Vision    Nutrition Therapy:  Current Nutrition Therapy:   - Oral Diet:  General and Low Sodium (2gm)    Routes of Feeding: Oral  Liquids: No Restrictions  Daily Fluid Restriction: no  Last Modified Barium Swallow with Video (Video Swallowing Test): not done    Treatments at the Time of Hospital Discharge:   Respiratory Treatments: ***  Oxygen Therapy:  is not on home oxygen therapy. Ventilator:    - No ventilator support    Rehab Therapies: PT and OT to eval and treat   Weight Bearing Status/Restrictions: No weight bearing restrictions  Other Medical Equipment (for information only, NOT a DME order):  cane  Other Treatments:       Patient's personal belongings (please select all that are sent with patient):  Glasses    RN SIGNATURE:  Electronically signed by Osbaldo Jimenez RN on 4/8/22 at 4:56 PM EDT    CASE MANAGEMENT/SOCIAL WORK SECTION    Inpatient Status Date: ***    Readmission Risk Assessment Score:  Readmission Risk              Risk of Unplanned Readmission:  18           Discharging to Facility/ Agency   Name: [facility]   Address:  Phone:  Fax:    Dialysis Facility (if applicable)   Name:  Address:  Dialysis Schedule:  Phone:  Fax:    / signature: Electronically signed by MARYSOL Davis on 4/8/2022 at 3:55 PM      PHYSICIAN SECTION    Prognosis: {Prognosis:8078428807}    Condition at Discharge: [address] Patient Condition:[id number]}    Rehab Potential (if transferring to Rehab): {Prognosis:2457167380}    Recommended Labs or Other Treatments After Discharge: ***    Physician Certification: I certify the above information and transfer of Kike Espinoza  is necessary for the continuing treatment of the diagnosis listed and that he requires [facility] for less 30 days.      Update Admission H&P: {P DME Changes in [facility]:[id number]}    PHYSICIAN SIGNATURE:  {Esignature:[id number]}

## 2022-04-08 NOTE — PATIENT CARE CONFERENCE
[facility] Quality Flow/Interdisciplinary Rounds Progress Note        Quality Flow Rounds held on April 8, 2022    Disciplines Attending:  Bedside Nurse, ,  and Nursing Unit Leadership    Gale Gregorio was admitted on 4/2/2022  3:13 PM    Anticipated Discharge Date:  Expected Discharge Date: 04/11/22    Disposition:    Rex Score:  Rex Scale Score: 17    Readmission Risk              Risk of Unplanned Readmission:  18           Discussed patient goal for the day, patient clinical progression, and barriers to discharge.   The following Goal(s) of the Day/Commitment(s) have been identified:  Diagnostics - Report Results      Ana Perrin RN  April 8, 2022

## 2022-04-08 NOTE — PROGRESS NOTES
Occupational Therapy  OT BEDSIDE TREATMENT NOTE   [facility] [address] [facility]  [address] [place] [address]  Patient Name: Crow Abraham  MRN: [id number]  : 1935  Room: [address] [place] [address]     Per OT Eval:    Evaluating OT: Vishal Wilcox OTR/L [id number]     Referring Provider: Malka Walton MD   Specific Provider Orders/Date: OT eval and treat (22)        Diagnosis: Hiatal hernia                       Aortic Dissection                            R knee effusion, R should pain     Reason for admission: presenting with severe chest pain radiating to the back.  Initial concern was for possible dissection.  Patient had a chest x-ray that indicated large hiatal hernia.      Surgery/Procedures: R knee drained      Pertinent Medical History: pancreatitis, Anxiety. CAD, HLD, Psoriasis, OCD         *Precautions:  Fall Risk, WBAT R UE/LE, maintain BP <120,   HR <60     Assessment of current deficits   [x]? Functional mobility          [x]? ADLs           [x]? Strength                  []?Cognition   [x]? Functional transfers        [x]? IADLs         [x]? Safety Awareness   [x]? Endurance   []? Fine Coordination           [x]? ROM           []? Vision/perception    []? Sensation     []? Gross Motor Coordination [x]? Balance    []? Delirium                  []?Motor Control     []?  Communication     OT PLAN OF CARE   OT POC based on physician orders, patient diagnosis and results of clinical assessment.        Frequency/Duration: 1-3 days/wk for 1-2 weeks PRN    Specific OT Treatment to include:   ADL retraining/adapted techniques and AE recommendations to increase functional independence within precautions                    Energy conservation techniques to improve tolerance for selfcare routine   Functional transfer/mobility training/DME recommendations for increased independence, safety and fall prevention         Patient/family education to increase safety and functional independence within precautions              Environmental modifications for safe mobility and completion of ADLs                                                     Therapeutic activity to improve functional performance during ADLs/IADLs                                         Therapeutic exercise to improve tolerance and functional strength for ADLs   Balance retraining exercises/tasks for facilitation of postural control with dynamic challenges during ADLs . Positioning to improve functional independence               Delirium prevention/treatment     Recommended Adaptive Equipment: TBA: LB dressing AE pending progress, [employer]      Home Living: Pt lives alone  in a split level home with 1 step(s) to enter and 1 rail(s) to laundry room and half bath + 4  steps up to kitchen and living room area +8 steps/rail up to bed/bath  Bathroom setup: tub/shower with rail and seat; commode rail  Equipment owned: bathroom [facility]     Prior Level of Function: Mack with ADLs; IND with IADLs. Recently using SPC for ambulation. Driving: yes  Occupation: retired wholesale retail     Pain Level: Pt complained of R shoulder pain, chest pain, nursing present and aware     Cognition: A&O: 4/4    Follows 1-2 step commands appropriately. Memory: good             Comprehension good             Problem solving: good             Judgement/safety: fair+/good                Communication skills: WFL             Vision: WFL                    Glasses:yes                                                       Hearing: WFL               RASS: 0  CAM-ICU: (NT) Delirium     UE Assessment:  Hand Dominance: Right [x]? Left []?       ROM Strength STM goal: PRN   RUE  Pt reports h/o R scapular fx; reports new onset on R shoulder pain (limited flexion and rotation); WFL distal  3-/5 proximal  4/5 distal  WFL for ADLS      LUE WFL 4/5           Sensation: No c/o numbness/tingling in extremities.    Tone: WNL   Edema: R knee     Functional Assessment:  AM-PAC Daily Activity Raw Score: 14/24    Initial Eval Status  Date: 4/7 Treatment Status  Date: 4/8/22 STGs = LTGs  Time frame: 7-14 days   Feeding S; set up  Setup  Pt able to grasp cup, bring to mouth                       IND  while seated up in chair to increase activity tolerance         Grooming Min A  Dot  Pt washed face, applied deodorant seated                       Mack   while standing sink level requiring AD for balance and demonstrating G tolerance       UB dressing/bathing Mod A  modA-dressing  [facility] gown seated    Dot-bathing  Simulated Task                       Mack         LB dressing/bathing Mod A  using reacher & sock aid after instruction maxA-dressing  [facility] socks    modA-bathing  Simulated Task  Pt able to wash of LE's bending at hips, assistance to stand and wash of buttocks/miracle area  Recommending of long tyron sponge                        Mack   using AE as needed for safe reach/ energy conservation        Toileting NT maxA  Attempted to have pt sit on standard commode, pt unable to complete stand<>sit onto toilet transfer with grab bar, having posterior lean. Pt stating of using of urinal and bed pan recently                        Mack      Bed Mobility  Supine to sit:   Min A     Sit to supine:   NT modA  Supine<>EOB                        Mack  in prep of ADL tasks & transfers   Functional Transfers Sit to stand:    Mod A     Stand to sit:   Mod A modA  Sit to Stand  Stand to Sit    Cueing for hand placement                        Mack  sit<>stand/functional bathroom transfers using AD/DME as needed for balance and safety   Functional Mobility Mod A SPC  (pivot to chair)  Min A WW Dot  Pt ambulated short household distance in room with w.w and seated rest break, cueing for safety and walker management                       Mack   functional/bathroom mobility using AD as needed & demonstrating G safety      Balance Sitting: Static:  SBA    Dynamic:Min A  Standing: Mod A [employer]  (posterior LOB) Sitting EOB:  SBA    Standing:  modA   Posterior Lean Mack dynamic sitting balance; Mack dynamic standing balance  during ADL tasks & transfers   Endurance/Activity Tolerance    F tolerance with moderate activity.   Fair- G   tolerance with moderate activity/self care routine   Visual/  Perceptual    WFL                             Education:  Pt was educated on role of OT,goals to be reached, importance of OOB activity, safety and hand placement with transfers, safety and walker management with functional mobility, compensatory strategies to assist with ADL tasks, use of DME to assist with ADL tasks, and energy conservation techniques. Comments: Upon arrival pt supine in bed, agreeable to therapy, niece present. Pt completed of bed mobility, functional mobility, transfers and light ADL tasks this session. Pt requiring of rest breaks throughout session, monitoring of O2. Spoke to pt in regards to home setup, stating of having shower bench and raised toilet already. Once setaed upright in chair, pt complaining of chest pain to L side, nursing notified and becoming present. At end of session, pt seated upright in chair, all lines and tubes intact, call light within reach, nursing present, doctor becoming present and niece still present. · Pt has made fair progress towards set goals.    · Continue with current plan of care focusing on increasing of independency with transfers and ADL tasks      Treatment Time In: 2:50pm            Treatment Time Out: 3:30pm             Treatment Charges: Mins Units   Ther Ex  [id number]     Manual Therapy 75882     Thera Activities 98748 28 2   ADL/Home Mgt 08687 12 1   Neuro Re-ed 51903     Group Therapy      Orthotic manage/training  30031     Non-Billable Time     Total Timed Treatment 40 3        Ana Smith PINO/L [id number]

## 2022-04-08 NOTE — PLAN OF CARE
Problem: Pain:  Goal: Pain level will decrease  Description: Pain level will decrease  4/8/2022 0237 by Salena Oliva RN  Outcome: Met This Shift  4/8/2022 0230 by Salena Oliva RN  Outcome: Met This Shift  Goal: Control of acute pain  Description: Control of acute pain  4/8/2022 0237 by Salena Oliva RN  Outcome: Met This Shift  4/8/2022 0230 by Salena Oliva RN  Outcome: Met This Shift  Goal: Control of chronic pain  Description: Control of chronic pain  4/8/2022 0237 by Salena Oliva RN  Outcome: Met This Shift  4/8/2022 0230 by Salena Oliva RN  Outcome: Met This Shift

## 2022-04-11 ENCOUNTER — CARE COORDINATION (OUTPATIENT)
Dept: CASE MANAGEMENT | Age: 87
End: 2022-04-11

## 2022-04-11 LAB
BODY FLUID CULTURE, STERILE: NORMAL
GRAM STAIN RESULT: NORMAL

## 2022-04-14 ENCOUNTER — TELEPHONE (OUTPATIENT)
Dept: VASCULAR SURGERY | Age: 87
End: 2022-04-14

## 2022-04-14 DIAGNOSIS — I71.012 DESCENDING THORACIC AORTIC DISSECTION: Primary | ICD-10-CM

## 2022-04-14 NOTE — TELEPHONE ENCOUNTER
Spoke with [facility] at [facility]; scheduled CTA chest at [facility] (1-RH) 5/9/22 at 9:00 a.m, ov with Dr. Isadora Galvez 5/16 at 1:00 pm.

## 2022-04-20 LAB
SARS-COV-2: NOT DETECTED
SOURCE: NORMAL

## 2022-04-23 LAB
SARS-COV-2: NOT DETECTED
SOURCE: NORMAL

## 2022-04-29 ENCOUNTER — CARE COORDINATION (OUTPATIENT)
Dept: CASE MANAGEMENT | Age: 87
End: 2022-04-29

## 2022-04-29 NOTE — CARE COORDINATION
[facility] Initial Follow [facility] Call    Call within 2 business days of discharge: Yes    Patient: Shmuel Gage Patient : 1935   MRN: <[id number]>  Reason for Admission:   Discharge Date: 22 RARS: Readmission Risk Score: 13 ( )      Last Discharge [facility]       Complaint Diagnosis Description Type Department Provider    22 Back Pain 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[facility] [address] . .. ED to Hosp-Admission (Discharged) (ADMITTED) [address] Manuel Pablo MD; Luis. .. Acute Care Course:   Patient admitted to [place] [address] from -2022 with dissection of thoracoabdominal aorta. He then went to [facility] for 20 days discharging on 2022. HFU made:  Yes 22    Sig Hx:   HTN, GERD, Psoriasis, Anemia, HLD, Depression  DME: [employer]    Conversation:   Patient states he is doing fine. He denies back pain, cp, palpitations, swelling, fever, chills, nvd or sob. Appetite and fluid intake is good. He eats small portions several times a day. No problems with bladder or bowels at this time. Patient has soreness in his rt rib cage from bumping it. Patient uses a walker for mobility. He has hhc and f/u set up. Patient unable to review medication. His niece handles his medication. Will try her next week. Patient stated she and her  are traveling today. Follow up plan:    Transitions of Care Initial Call    Was this an external facility discharge? No Discharge Facility: [address] [place] [address] Way to be reviewed by the provider   Additional needs identified to be addressed with provider: No  none             Method of communication with provider : none      Advance Care Planning:   Does patient have an Advance Directive: not on file; education provided. Was this a readmission?  No  Patient stated reason for admission: back pain  Patients top risk factors for readmission: depression  functional physical ability  ineffective coping  level of motivation  medical condition-htn, gerd, anemia, hld    Care Transition Nurse (CTN) contacted the patient by telephone to perform post hospital discharge assessment. Verified name and  with patient as identifiers. Provided introduction to self, and explanation of the CTN role. CTN reviewed discharge instructions, medical action plan and red flags with parent who verbalized understanding. Patient given an opportunity to ask questions and does not have any further questions or concerns at this time. Were discharge instructions available to patient? No. Reviewed appropriate site of care based on symptoms and resources available to patient including: Specialist. The patient agrees to contact the PCP office for questions related to their healthcare. Medication reconciliation was performed with not done, who verbalizes understanding of administration of home medications. Advised obtaining a 90-day supply of all daily and as-needed medications. Covid Risk Education     Educated patient about risk for severe COVID-19 due to risk factors according to CDC guidelines. LPN CC reviewed discharge instructions, medical action plan and red flag symptoms with the patient who verbalized understanding. Discussed COVID vaccination status: Yes. Education provided on COVID-19 vaccination as appropriate. Discussed exposure protocols and quarantine with CDC Guidelines. Patient was given an opportunity to verbalize any questions and concerns and agrees to contact LPN CC or health care provider for questions related to their healthcare. Reviewed and educated not done on any new and changed medications related to discharge diagnosis. Was patient discharged with a pulse oximeter? Patient has his own. Discussed and confirmed pulse oximeter discharge instructions and when to notify provider or seek emergency care. LPN CC provided contact information.  Plan for follow-up call in 3-5 days based on severity of symptoms and risk factors.   Plan for next call: symptom management-pain  medication management-medication reconciliation        Non-face-to-face services provided:  Education of patient/family/caregiver/guardian to support self-management--    Care Transitions 24 Hour Call    Care Transitions Interventions         Follow Up  Future Appointments   Date Time Provider Fatou Tracy   5/6/2022 11:45 AM Alejandro Gold DO [facility] SANJANA [facility]   5/9/2022  9:00 AM [facility] CT SCAN 3 SEYZ CT [facility]   5/16/2022  1:15 PM Idalia Hopkins MD [facility]   8/8/2022 11:15 AM Alejandro Gold DO [facility] SANJANA Woody LPN

## 2022-05-01 ENCOUNTER — APPOINTMENT (OUTPATIENT)
Dept: CT IMAGING | Age: 87
DRG: 300 | End: 2022-05-01
Payer: MEDICARE

## 2022-05-01 ENCOUNTER — APPOINTMENT (OUTPATIENT)
Dept: GENERAL RADIOLOGY | Age: 87
DRG: 300 | End: 2022-05-01
Payer: MEDICARE

## 2022-05-01 ENCOUNTER — HOSPITAL ENCOUNTER (INPATIENT)
Age: 87
LOS: 2 days | Discharge: HOME HEALTH | DRG: 300 | End: 2022-05-03
Attending: EMERGENCY MEDICINE | Admitting: INTERNAL MEDICINE
Payer: MEDICARE

## 2022-05-01 DIAGNOSIS — I71.9 AORTIC ANEURYSM, DESCENDING (HCC): Primary | ICD-10-CM

## 2022-05-01 PROBLEM — I71.00 AORTIC DISSECTION (HCC): Status: ACTIVE | Noted: 2022-05-01

## 2022-05-01 LAB
ABO/RH: NORMAL
ALBUMIN SERPL-MCNC: 3 G/DL (ref 3.5–5.2)
ALP BLD-CCNC: 91 U/L (ref 40–129)
ALT SERPL-CCNC: 16 U/L (ref 0–40)
ANION GAP SERPL CALCULATED.3IONS-SCNC: 11 MMOL/L (ref 7–16)
ANTIBODY SCREEN: NORMAL
APTT: 27.5 SEC (ref 24.5–35.1)
AST SERPL-CCNC: 16 U/L (ref 0–39)
BASOPHILS ABSOLUTE: 0.07 E9/L (ref 0–0.2)
BASOPHILS RELATIVE PERCENT: 0.6 % (ref 0–2)
BILIRUB SERPL-MCNC: 0.6 MG/DL (ref 0–1.2)
BUN BLDV-MCNC: 19 MG/DL (ref 6–23)
CALCIUM SERPL-MCNC: 9 MG/DL (ref 8.6–10.2)
CHLORIDE BLD-SCNC: 101 MMOL/L (ref 98–107)
CO2: 23 MMOL/L (ref 22–29)
CREAT SERPL-MCNC: 1.2 MG/DL (ref 0.7–1.2)
EKG ATRIAL RATE: 93 BPM
EKG P AXIS: 42 DEGREES
EKG P-R INTERVAL: 226 MS
EKG Q-T INTERVAL: 392 MS
EKG QRS DURATION: 112 MS
EKG QTC CALCULATION (BAZETT): 487 MS
EKG R AXIS: -33 DEGREES
EKG T AXIS: 16 DEGREES
EKG VENTRICULAR RATE: 93 BPM
EOSINOPHILS ABSOLUTE: 0.4 E9/L (ref 0.05–0.5)
EOSINOPHILS RELATIVE PERCENT: 3.7 % (ref 0–6)
GFR AFRICAN AMERICAN: >60
GFR NON-AFRICAN AMERICAN: 57 ML/MIN/1.73
GLUCOSE BLD-MCNC: 110 MG/DL (ref 74–99)
HCT VFR BLD CALC: 38.9 % (ref 37–54)
HEMOGLOBIN: 12.4 G/DL (ref 12.5–16.5)
IMMATURE GRANULOCYTES #: 0.04 E9/L
IMMATURE GRANULOCYTES %: 0.4 % (ref 0–5)
INR BLD: 1.2
LIPASE: 45 U/L (ref 13–60)
LYMPHOCYTES ABSOLUTE: 2.83 E9/L (ref 1.5–4)
LYMPHOCYTES RELATIVE PERCENT: 26.1 % (ref 20–42)
MCH RBC QN AUTO: 29.4 PG (ref 26–35)
MCHC RBC AUTO-ENTMCNC: 31.9 % (ref 32–34.5)
MCV RBC AUTO: 92.2 FL (ref 80–99.9)
MONOCYTES ABSOLUTE: 1.31 E9/L (ref 0.1–0.95)
MONOCYTES RELATIVE PERCENT: 12.1 % (ref 2–12)
NEUTROPHILS ABSOLUTE: 6.21 E9/L (ref 1.8–7.3)
NEUTROPHILS RELATIVE PERCENT: 57.1 % (ref 43–80)
PDW BLD-RTO: 13.9 FL (ref 11.5–15)
PLATELET # BLD: 217 E9/L (ref 130–450)
PMV BLD AUTO: 10.4 FL (ref 7–12)
POTASSIUM REFLEX MAGNESIUM: 4 MMOL/L (ref 3.5–5)
PROTHROMBIN TIME: 13.7 SEC (ref 9.3–12.4)
RBC # BLD: 4.22 E12/L (ref 3.8–5.8)
SARS-COV-2, PCR: NOT DETECTED
SODIUM BLD-SCNC: 135 MMOL/L (ref 132–146)
TOTAL PROTEIN: 7.1 G/DL (ref 6.4–8.3)
TROPONIN, HIGH SENSITIVITY: 27 NG/L (ref 0–11)
TROPONIN, HIGH SENSITIVITY: 27 NG/L (ref 0–11)
WBC # BLD: 10.9 E9/L (ref 4.5–11.5)

## 2022-05-01 PROCEDURE — 96374 THER/PROPH/DIAG INJ IV PUSH: CPT

## 2022-05-01 PROCEDURE — 71045 X-RAY EXAM CHEST 1 VIEW: CPT

## 2022-05-01 PROCEDURE — 2580000003 HC RX 258: Performed by: RADIOLOGY

## 2022-05-01 PROCEDURE — 80053 COMPREHEN METABOLIC PANEL: CPT

## 2022-05-01 PROCEDURE — 86900 BLOOD TYPING SEROLOGIC ABO: CPT

## 2022-05-01 PROCEDURE — 2140000000 HC CCU INTERMEDIATE R&B

## 2022-05-01 PROCEDURE — 93005 ELECTROCARDIOGRAM TRACING: CPT | Performed by: EMERGENCY MEDICINE

## 2022-05-01 PROCEDURE — 85025 COMPLETE CBC W/AUTO DIFF WBC: CPT

## 2022-05-01 PROCEDURE — 85730 THROMBOPLASTIN TIME PARTIAL: CPT

## 2022-05-01 PROCEDURE — 71275 CT ANGIOGRAPHY CHEST: CPT

## 2022-05-01 PROCEDURE — G0378 HOSPITAL OBSERVATION PER HR: HCPCS

## 2022-05-01 PROCEDURE — 6370000000 HC RX 637 (ALT 250 FOR IP): Performed by: EMERGENCY MEDICINE

## 2022-05-01 PROCEDURE — 99285 EMERGENCY DEPT VISIT HI MDM: CPT

## 2022-05-01 PROCEDURE — 74174 CTA ABD&PLVS W/CONTRAST: CPT

## 2022-05-01 PROCEDURE — 86850 RBC ANTIBODY SCREEN: CPT

## 2022-05-01 PROCEDURE — 86901 BLOOD TYPING SEROLOGIC RH(D): CPT

## 2022-05-01 PROCEDURE — 93005 ELECTROCARDIOGRAM TRACING: CPT | Performed by: INTERNAL MEDICINE

## 2022-05-01 PROCEDURE — 2580000003 HC RX 258: Performed by: FAMILY MEDICINE

## 2022-05-01 PROCEDURE — 6370000000 HC RX 637 (ALT 250 FOR IP): Performed by: FAMILY MEDICINE

## 2022-05-01 PROCEDURE — 6360000004 HC RX CONTRAST MEDICATION: Performed by: RADIOLOGY

## 2022-05-01 PROCEDURE — 83690 ASSAY OF LIPASE: CPT

## 2022-05-01 PROCEDURE — 85610 PROTHROMBIN TIME: CPT

## 2022-05-01 PROCEDURE — 99223 1ST HOSP IP/OBS HIGH 75: CPT | Performed by: SURGERY

## 2022-05-01 PROCEDURE — 84484 ASSAY OF TROPONIN QUANT: CPT

## 2022-05-01 PROCEDURE — 93010 ELECTROCARDIOGRAM REPORT: CPT | Performed by: INTERNAL MEDICINE

## 2022-05-01 PROCEDURE — 6360000002 HC RX W HCPCS: Performed by: EMERGENCY MEDICINE

## 2022-05-01 RX ORDER — CHOLESTYRAMINE LIGHT 4 G/5.7G
4 POWDER, FOR SUSPENSION ORAL DAILY
Refills: 3 | Status: DISCONTINUED | OUTPATIENT
Start: 2022-05-01 | End: 2022-05-03 | Stop reason: HOSPADM

## 2022-05-01 RX ORDER — SODIUM CHLORIDE 0.9 % (FLUSH) 0.9 %
5-40 SYRINGE (ML) INJECTION PRN
Status: DISCONTINUED | OUTPATIENT
Start: 2022-05-01 | End: 2022-05-03 | Stop reason: HOSPADM

## 2022-05-01 RX ORDER — ACETAMINOPHEN 325 MG/1
650 TABLET ORAL EVERY 6 HOURS PRN
Status: DISCONTINUED | OUTPATIENT
Start: 2022-05-01 | End: 2022-05-03 | Stop reason: HOSPADM

## 2022-05-01 RX ORDER — DOCUSATE SODIUM 100 MG/1
100 CAPSULE, LIQUID FILLED ORAL 2 TIMES DAILY
Status: DISCONTINUED | OUTPATIENT
Start: 2022-05-01 | End: 2022-05-03 | Stop reason: HOSPADM

## 2022-05-01 RX ORDER — ONDANSETRON 2 MG/ML
4 INJECTION INTRAMUSCULAR; INTRAVENOUS EVERY 6 HOURS PRN
Status: DISCONTINUED | OUTPATIENT
Start: 2022-05-01 | End: 2022-05-03 | Stop reason: HOSPADM

## 2022-05-01 RX ORDER — AMLODIPINE BESYLATE 5 MG/1
10 TABLET ORAL ONCE
Status: COMPLETED | OUTPATIENT
Start: 2022-05-01 | End: 2022-05-01

## 2022-05-01 RX ORDER — ONDANSETRON 4 MG/1
4 TABLET, ORALLY DISINTEGRATING ORAL EVERY 8 HOURS PRN
Status: DISCONTINUED | OUTPATIENT
Start: 2022-05-01 | End: 2022-05-03 | Stop reason: HOSPADM

## 2022-05-01 RX ORDER — SODIUM CHLORIDE 9 MG/ML
INJECTION, SOLUTION INTRAVENOUS PRN
Status: DISCONTINUED | OUTPATIENT
Start: 2022-05-01 | End: 2022-05-03 | Stop reason: HOSPADM

## 2022-05-01 RX ORDER — FERROUS SULFATE 325(65) MG
325 TABLET ORAL
Status: DISCONTINUED | OUTPATIENT
Start: 2022-05-01 | End: 2022-05-03 | Stop reason: HOSPADM

## 2022-05-01 RX ORDER — FENTANYL CITRATE 50 UG/ML
25 INJECTION, SOLUTION INTRAMUSCULAR; INTRAVENOUS ONCE
Status: COMPLETED | OUTPATIENT
Start: 2022-05-01 | End: 2022-05-01

## 2022-05-01 RX ORDER — MORPHINE SULFATE 2 MG/ML
1 INJECTION, SOLUTION INTRAMUSCULAR; INTRAVENOUS
Status: DISCONTINUED | OUTPATIENT
Start: 2022-05-01 | End: 2022-05-03 | Stop reason: HOSPADM

## 2022-05-01 RX ORDER — AMLODIPINE BESYLATE 10 MG/1
10 TABLET ORAL DAILY
Status: DISCONTINUED | OUTPATIENT
Start: 2022-05-01 | End: 2022-05-03 | Stop reason: HOSPADM

## 2022-05-01 RX ORDER — FUROSEMIDE 40 MG/1
40 TABLET ORAL DAILY
Status: DISCONTINUED | OUTPATIENT
Start: 2022-05-01 | End: 2022-05-03 | Stop reason: HOSPADM

## 2022-05-01 RX ORDER — SODIUM CHLORIDE 0.9 % (FLUSH) 0.9 %
10 SYRINGE (ML) INJECTION PRN
Status: COMPLETED | OUTPATIENT
Start: 2022-05-01 | End: 2022-05-01

## 2022-05-01 RX ORDER — POLYETHYLENE GLYCOL 3350 17 G/17G
17 POWDER, FOR SOLUTION ORAL DAILY PRN
Status: DISCONTINUED | OUTPATIENT
Start: 2022-05-01 | End: 2022-05-03 | Stop reason: HOSPADM

## 2022-05-01 RX ORDER — LISINOPRIL 20 MG/1
20 TABLET ORAL DAILY
Status: DISCONTINUED | OUTPATIENT
Start: 2022-05-01 | End: 2022-05-03 | Stop reason: HOSPADM

## 2022-05-01 RX ORDER — ROSUVASTATIN CALCIUM 10 MG/1
10 TABLET, COATED ORAL NIGHTLY
Status: DISCONTINUED | OUTPATIENT
Start: 2022-05-01 | End: 2022-05-03 | Stop reason: HOSPADM

## 2022-05-01 RX ORDER — METOPROLOL TARTRATE 50 MG/1
100 TABLET, FILM COATED ORAL 3 TIMES DAILY
Status: DISCONTINUED | OUTPATIENT
Start: 2022-05-01 | End: 2022-05-03

## 2022-05-01 RX ORDER — LISINOPRIL 10 MG/1
20 TABLET ORAL ONCE
Status: COMPLETED | OUTPATIENT
Start: 2022-05-01 | End: 2022-05-01

## 2022-05-01 RX ORDER — MIRTAZAPINE 15 MG/1
7.5 TABLET, FILM COATED ORAL NIGHTLY
Status: DISCONTINUED | OUTPATIENT
Start: 2022-05-01 | End: 2022-05-03 | Stop reason: HOSPADM

## 2022-05-01 RX ORDER — ACETAMINOPHEN 650 MG/1
650 SUPPOSITORY RECTAL EVERY 6 HOURS PRN
Status: DISCONTINUED | OUTPATIENT
Start: 2022-05-01 | End: 2022-05-03 | Stop reason: HOSPADM

## 2022-05-01 RX ORDER — METOPROLOL TARTRATE 50 MG/1
100 TABLET, FILM COATED ORAL ONCE
Status: COMPLETED | OUTPATIENT
Start: 2022-05-01 | End: 2022-05-01

## 2022-05-01 RX ORDER — PAROXETINE HYDROCHLORIDE 20 MG/1
20 TABLET, FILM COATED ORAL DAILY
Status: DISCONTINUED | OUTPATIENT
Start: 2022-05-01 | End: 2022-05-03 | Stop reason: HOSPADM

## 2022-05-01 RX ORDER — SODIUM CHLORIDE 0.9 % (FLUSH) 0.9 %
5-40 SYRINGE (ML) INJECTION EVERY 12 HOURS SCHEDULED
Status: DISCONTINUED | OUTPATIENT
Start: 2022-05-01 | End: 2022-05-03 | Stop reason: HOSPADM

## 2022-05-01 RX ADMIN — METOPROLOL TARTRATE 100 MG: 50 TABLET, FILM COATED ORAL at 15:31

## 2022-05-01 RX ADMIN — Medication 10 ML: at 20:24

## 2022-05-01 RX ADMIN — Medication 10 ML: at 03:55

## 2022-05-01 RX ADMIN — LISINOPRIL 20 MG: 10 TABLET ORAL at 07:03

## 2022-05-01 RX ADMIN — FUROSEMIDE 40 MG: 40 TABLET ORAL at 11:05

## 2022-05-01 RX ADMIN — PAROXETINE 20 MG: 20 TABLET, FILM COATED ORAL at 11:41

## 2022-05-01 RX ADMIN — METOPROLOL TARTRATE 100 MG: 50 TABLET, FILM COATED ORAL at 07:02

## 2022-05-01 RX ADMIN — AMLODIPINE BESYLATE 10 MG: 5 TABLET ORAL at 07:02

## 2022-05-01 RX ADMIN — ROSUVASTATIN 10 MG: 20 TABLET, FILM COATED ORAL at 20:24

## 2022-05-01 RX ADMIN — FENTANYL CITRATE 25 MCG: 50 INJECTION, SOLUTION INTRAMUSCULAR; INTRAVENOUS at 05:25

## 2022-05-01 RX ADMIN — CHOLESTYRAMINE 4 G: 4 POWDER, FOR SUSPENSION ORAL at 11:41

## 2022-05-01 RX ADMIN — IOPAMIDOL 100 ML: 755 INJECTION, SOLUTION INTRAVENOUS at 03:55

## 2022-05-01 RX ADMIN — MIRTAZAPINE 7.5 MG: 15 TABLET, FILM COATED ORAL at 20:23

## 2022-05-01 RX ADMIN — FERROUS SULFATE TAB 325 MG (65 MG ELEMENTAL FE) 325 MG: 325 (65 FE) TAB at 11:05

## 2022-05-01 RX ADMIN — DOCUSATE SODIUM 100 MG: 100 CAPSULE ORAL at 20:24

## 2022-05-01 RX ADMIN — Medication 10 ML: at 11:25

## 2022-05-01 ASSESSMENT — LIFESTYLE VARIABLES: HOW OFTEN DO YOU HAVE A DRINK CONTAINING ALCOHOL: NEVER

## 2022-05-01 ASSESSMENT — PAIN DESCRIPTION - DESCRIPTORS: DESCRIPTORS: JABBING;SHARP;SHOOTING

## 2022-05-01 ASSESSMENT — PAIN SCALES - GENERAL
PAINLEVEL_OUTOF10: 6
PAINLEVEL_OUTOF10: 8
PAINLEVEL_OUTOF10: 7

## 2022-05-01 ASSESSMENT — PAIN DESCRIPTION - ORIENTATION: ORIENTATION: RIGHT

## 2022-05-01 ASSESSMENT — PAIN - FUNCTIONAL ASSESSMENT: PAIN_FUNCTIONAL_ASSESSMENT: 0-10

## 2022-05-01 ASSESSMENT — PAIN DESCRIPTION - LOCATION: LOCATION: OTHER (COMMENT)

## 2022-05-01 NOTE — CONSULTS
Vascular Surgery Consultation Note    Reason for Consult:  Type B dissection,  pseudoaneurysm    HPI:    This is a 80 y.o. male who is admitted to the hospital for treatment of chest pain. Pt was recently seen on 4/2 with SOB and chest pain. 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 After tight BP control he was discharged and spent three weeks in rehab. Went home for the first time Friday and on Saturday started having unbearable pain with severe radiation to his back. On presentation his SBP was 141. He had a CTA of chest that shows, type B dissection and acute  Pseudoaneurysm. After getting SBP under control in ED and with some mild pain medications, all symptoms have resolved     Vascular surgery is consulted for evaluation and treatment of type B dissection,  pseudoaneurysm.       ROS: Negative if blank [], Positive if [x]  General Vascular   [] Fevers [] Claudication (Blocks)   [] Chills [] Rest Pain   [] Weight Loss [] Tissue Loss   [] Chest Pain [] Clotting Disorder   [] SOB at rest [] Leg Swelling   [] SOB with exertion [] DVT/PE      [] Nausea    [] Vomiting [] Stroke/TIA   [] Abdominal Pain [] Focal weakness   [] Melena [] Slurred Speech   [] Hematochezia [] Vision Changes   [] Hematuria    [] Dysuria [] Hx of Central Catheters   [] Wears Glasses/Contacts [] Dialysis and If so date initiated   [] Blindness    [] Right Hand Dominant   [] Difficulty swallowing        Past Medical History:   Diagnosis Date    Acute pancreatitis     6/06 AND 11/09    Anxiety     BPH (benign prostatic hyperplasia)     CAD (coronary artery disease)     GERD (gastroesophageal reflux disease)     Hyperlipidemia     Obsessive compulsive disorder     Psoriasis         Past Surgical History:   Procedure Laterality Date    CATARACT REMOVAL Bilateral 08/24/2017    COLONOSCOPY  03/14/2017    THROAT SURGERY      UPPER GASTROINTESTINAL ENDOSCOPY  03/14/2017       Current Medications:    sodium chloride        sodium chloride flush, sodium chloride, ondansetron **OR** ondansetron, polyethylene glycol, acetaminophen **OR** acetaminophen    amLODIPine  10 mg Oral Daily    docusate sodium  100 mg Oral BID    ferrous sulfate  325 mg Oral Daily with breakfast    furosemide  40 mg Oral Daily    lisinopril  20 mg Oral Daily    metoprolol  100 mg Oral TID    mirtazapine  7.5 mg Oral Nightly    PARoxetine  1 tablet Oral Daily    rosuvastatin  10 mg Oral Nightly    cholestyramine light  4 g Oral Daily    sodium chloride flush  5-40 mL IntraVENous 2 times per day        Allergies:  Patient has no known allergies. Social History     Socioeconomic History    Marital status:      Spouse name: Not on file    Number of children: Not on file    Years of education: Not on file    Highest education level: Not on file   Occupational History    Not on file   Tobacco Use    Smoking status: Former Smoker     Types: Cigarettes     Quit date: 10/24/1969     Years since quittin.5    Smokeless tobacco: Never Used   Substance and Sexual Activity    Alcohol use: Yes     Comment: OCCASIONAL BEER    Drug use: No    Sexual activity: Not on file   Other Topics Concern    Not on file   Social History Narrative    Not on file     Social Determinants of Health     Financial Resource Strain: Low Risk     Difficulty of Paying Living Expenses: Not hard at all   Food Insecurity: No Food Insecurity    Worried About [address] [facility] in the Last Year: Never true    [facility] in the Last Year: Never true   Transportation Needs:     Lack of Transportation (Medical): Not on file    Lack of Transportation (Non-Medical):  Not on file   Physical Activity:     Days of Exercise per Week: Not on file    Minutes of Exercise per Session: Not on file   Stress:     Feeling of Stress : Not on file   Social Connections:     Frequency of Communication with Friends and Family: Not on file    Frequency of Social Gatherings with Friends and Family: Not on file    Attends Faith Services: Not on file    Active Member of Clubs or Organizations: Not on file    Attends Club or Organization Meetings: Not on file    Marital Status: Not on file   Intimate Partner Violence:     Fear of Current or Ex-Partner: Not on file    Emotionally Abused: Not on file    Physically Abused: Not on file    Sexually Abused: Not on file   Housing Stability:     Unable to Pay for Housing in the Last Year: Not on file    Number of [place] in the Last Year: Not on file    Unstable Housing in the Last Year: Not on file        Family History   Problem Relation Age of Onset    Asthma Mother     Coronary Art Dis Mother     Parkinsonism Father        PHYSICAL EXAM:    /75   Pulse 58   Temp 97 °F (36.1 °C) (Temporal)   Resp 22   Ht 5' 4\" (1.626 m)   Wt 153 lb (69.4 kg)   SpO2 100%   BMI 26.26 kg/m²   CONSTITUTIONAL:  awake, alert, cooperative, no apparent distress, and appears stated age  NEURO:  Normal  EYES:  lids and lashes normal, sclera clear and conjunctiva normal  NECK:  supple, symmetrical, trachea midline, no jugular venous distension, no carotid bruits  LUNGS:  no increased work of breathing  CARDIOVASCULAR:  regular rate and rhythm  ABDOMEN:  soft, non-distended, non-tender, Aorta is not palpable  SKIN:  no bruising or bleeding    EXTREMITIES:    R UE Swelling absent Incisions absent       5/5 Strength    L UE Swelling absent Incisions absent       5/5 Strength    R LE Edema absent     Incisions absent    Varicose veins absent    Wounds absent    normalcaprefill   5/5 Strength   Neuropathy is absent    L LE Edema absent     Incisions absent    Varicose veins absent    Wounds absent    normalcaprefill   5/5 Strength   Neuropathy is absent    R brachial  L brachial    R radial Palpable  L radial Palpable    R femoral  L femoral    R popliteal  L popliteal    R posterior tibial  L posterior tibial    R dorsalis pedis palpable L dorsalis pedis Palpable        LABS:    Lab Results   Component Value Date    WBC 10.9 05/01/2022    HGB 12.4 (L) 05/01/2022    HCT 38.9 05/01/2022     05/01/2022    PROTIME 13.7 (H) 05/01/2022    INR 1.2 05/01/2022    K 4.0 05/01/2022    BUN 19 05/01/2022    CREATININE 1.2 05/01/2022       RADIOLOGY:  XR CHEST PORTABLE    Result Date: 5/1/2022  EXAMINATION: ONE XRAY VIEW OF THE CHEST 5/1/2022 4:27 am COMPARISON: April 6, 2022. HISTORY: ORDERING SYSTEM PROVIDED HISTORY: cp TECHNOLOGIST PROVIDED HISTORY: Reason for exam:->cp What reading provider will be dictating this exam?->CRC FINDINGS: Stable cardiomegaly. Pulmonary vasculature is within normal limits. No effusion, focal consolidation or pneumothorax. Chronic interstitial changes are noted. No acute osseous abnormality. No acute findings. CTA CHEST W CONTRAST    Result Date: 5/1/2022  EXAMINATION: CTA OF THE CHEST 5/1/2022 3:52 am TECHNIQUE: CTA of the chest was performed after the administration of intravenous contrast.  Multiplanar reformatted images are provided for review. MIP images are provided for review. Dose modulation, iterative reconstruction, and/or weight based adjustment of the mA/kV was utilized to reduce the radiation dose to as low as reasonably achievable. COMPARISON: April 2, 2022. HISTORY: ORDERING SYSTEM PROVIDED HISTORY: chest pain TECHNOLOGIST PROVIDED HISTORY: Reason for exam:->chest pain Decision Support Exception - unselect if not a suspected or confirmed emergency medical condition->Emergency Medical Condition (MA) What reading provider will be dictating this exam?->CRC FINDINGS: Pulmonary Arteries: Pulmonary arteries are adequately opacified for evaluation. No evidence of intraluminal filling defect to suggest pulmonary embolism. Main pulmonary artery is normal in caliber. 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   Post-contrast images demonstrate evidence of noncalcified atherosclerotic plaque along the lateral margin of the descending thoracic aorta, as well as penetrating ulcers seen most notably along the distal 3rd of the descending thoracic aorta. There is a focal 1.7 cm region of outpouching along the mid to distal 3rd of the descending thoracic aorta, best seen on series 14, image 87, which raises suspicion for a small pseudoaneurysm type appearance. When compared to the previous study, there is no active extravasation of contrast along the false lumen to suggest an acute dissecting aneurysm. The noncontrast images demonstrate no definitive evidence of increased density along the wall of the descending thoracic aorta to suggest an intramural hematoma in the setting of acute aortic syndrome. No significant periaortic fat stranding is identified to suggest an impending acute process. There is no hilar or mediastinal adenopathy. No pericardial effusion. No obstructing endobronchial process. Moderate-sized hiatal hernia is noted. Lungs/pleura: Emphysematous changes are noted. In addition, chronic interstitial change and mild honeycombing is seen along the peripheral/subpleural aspects of the lungs, suggestive of a chronic process. No mass, consolidation, effusion or pneumothorax. Upper Abdomen: Please refer to the CTA of the abdomen for further information regarding the abdominal organs. No discrete soft tissue or osseous abnormality is seen. Soft Tissues/Bones: No acute bone or soft tissue abnormality. Again noted are findings consistent with an aneurysm involving the descending thoracic aorta, which is partially thrombosed. When compared to the previous study, the previously seen features of an acute dissecting aneurysm are not visualized. However, there is a 1.7 cm region of focal outpouching of contrast, suggestive of a pseudoaneurysm, with small penetrating ulcer seen.  No definitive evidence of acute aortic intramural hematoma is seen on the noncontrast images. Measurements are as above. No acute intrathoracic findings otherwise. Chronic interstitial/fibrotic changes involving the lungs. Please refer to the CT of the abdomen and pelvis for findings regarding the abdominal aorta. CTA ABDOMEN PELVIS W CONTRAST    Result Date: 5/1/2022  EXAMINATION: CTA OF THE ABDOMEN AND PELVIS WITH CONTRAST 5/1/2022 3:52 am: TECHNIQUE: CTA of the abdomen and pelvis was performed with the administration of intravenous contrast. Multiplanar reformatted images are provided for review. MIP images are provided for review. Dose modulation, iterative reconstruction, and/or weight based adjustment of the mA/kV was utilized to reduce the radiation dose to as low as reasonably achievable. COMPARISON: April 2, 2022. HISTORY: ORDERING SYSTEM PROVIDED HISTORY: chest pain, abdominal pain,. recent dissection TECHNOLOGIST PROVIDED HISTORY: Reason for exam:->chest pain, abdominal pain,. recent dissection Decision Support Exception - unselect if not a suspected or confirmed emergency medical condition->Emergency Medical Condition (MA) What reading provider will be dictating this exam?->CRC FINDINGS: CTA ABDOMEN: Aneurysmal dilatation of the aorta noted, at the level of the aortic hiatus, measuring up to 3.2 cm. No intimal flap. No perianeurysmal inflammation. 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  Borderline aneurysmal dilatation of the infrarenal abdominal aorta, measuring up to 2.5 cm, stable in appearance since the previous exam.  The remainder of the major branches of the abdominal aorta are patent, although prominent calcified atherosclerotic plaque is seen involving the majority of the branches.   There is evidence of prominent calcific plaque at the ostium of the renal artery bilaterally, more pronounced on the left than right, likely resulting in stenosis of the left proximal renal artery. This is also stable in appearance. Atherosclerotic calcifications and tortuosity of the aorta iliac bifurcation, without evidence of aneurysm or hemodynamically significant stenosis. The liver, spleen, adrenal glands, pancreas and kidneys are stable in appearance when compared to the previous exam. The small and large bowel are normal.  No bowel obstruction. Again seen is a left inguinal hernia, containing portions of sigmoid colon, without inflammatory change. No obstruction. The prostate gland is enlarged. Mild bladder wall thickening. No free fluid or lymphadenopathy within the pelvis. No evidence of mesenteric or retroperitoneal lymphadenopathy. No retroperitoneal fluid collections or masses identified. No lytic or blastic osseous lesions are seen. 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 Essentially stable CT angiogram of the abdomen and pelvis, as detailed above, again demonstrating mild aneurysmal dilatation at the level of the aortic hiatus, without dissection.   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 Additional findings as above, not significantly changed since the previous exam.         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 Lorena Escobar,   5/1/22  9:10 AM EDT    Pt seen and examined    R sided back pain seems more related to trauma - pt had recent fall prior to leaving rehab. His pain is positional, worse with palpation and different than the midline thoracic spine pain he had when his dissection was causing him issues. His bp is relatively well controlled. CTA was reviewed noting most of previous dissection lumen as thrombosed. Concern for possible penetrating aortic ulcer. Will have cardiology see for risk stratification for possible tevar for treatment of his dissection/penetrating aortic ulcer in future    I do not believe that his acute right sided back sxs are likely to be related to his aortic issue. Will call his niece 5/2 (per pt request he asked that I wait till 5/2 as she is out of town) to discuss with her also .     Manju Sinclair MD

## 2022-05-01 NOTE — CONSULTS
[facility] cardiology/[facility] [address] [place] [address]    Name: Kamille Johnson    Age: 80 y.o. Date of Admission: 5/1/2022  3:38 AM    Date of Service: 5/1/2022    Reason for Consultation: Chest pain, preoperative cardiovascular assessment recently diagnosed type B aortic dissection April 2, 2022    Referring Physician:     History of Present Illness: The patient is a 80y.o. year old male with a known history of longstanding hypertension and reports he fell 2 days ago banging the right side of his chest pretty significantly after he spun around and he got dizzy and went to the ground pretty hard. He reports over the next 24 hours the discomfort seem to be worse if he moved or took a deep breath or cough. He denies any exertional chest pain previously. He denies any known history of cardiovascular disease other than hypertension. Specifically no known history of myocardial infarction or congestive heart failure. He denies any history of syncope or near syncope or stroke. Past Medical History: I personally reviewed his old chart and prior echocardiogram done June 2021 showed LVEF 50% and no significant valve abnormality. Chronic long-term hypertension. Past surgical history: Cataract surgery      Review of Systems:     Constitutional: No fever, chills, sweats  Cardiac: As per HPI  Pulmonary: No cough, wheeze, hemoptysis  HEENT: No visual disturbances or difficult swallowing  GI: No nausea, vomiting, diarrhea, abdominal pain, rectal bleeding  : No dysuria or hematuria  Endocrine: No excessive thirst, heat or cold intolerance. Musculoskeletal: No joint pain or muscle aches.  No claudication  Skin: No skin breakdown or rashes  Neuro: No headache, confusion, or seizures  Psych: No depression, anxiety      Family History:  Family History   Problem Relation Age of Onset   [facility] Asthma Mother     Coronary Art Dis Mother     Parkinsonism Father        Social History:  Social History Socioeconomic History    Marital status:      Spouse name: Not on file    Number of children: Not on file    Years of education: Not on file    Highest education level: Not on file   Occupational History    Not on file   Tobacco Use    Smoking status: Former Smoker     Types: Cigarettes     Quit date: 10/24/1969     Years since quittin.5    Smokeless tobacco: Never Used   Substance and Sexual Activity    Alcohol use: Yes     Comment: OCCASIONAL BEER    Drug use: No    Sexual activity: Not on file   Other Topics Concern    Not on file   Social History Narrative    Not on file     Social Determinants of Health     Financial Resource Strain: Low Risk     Difficulty of Paying Living Expenses: Not hard at all   Food Insecurity: No Food Insecurity    Worried About [address] in the Last Year: Never true    [address] [employer] in the Last Year: Never true   Transportation Needs:     Lack of Transportation (Medical): Not on file    Lack of Transportation (Non-Medical):  Not on file   Physical Activity:     Days of Exercise per Week: Not on file    Minutes of Exercise per Session: Not on file   Stress:     Feeling of Stress : Not on file   Social Connections:     Frequency of Communication with Friends and Family: Not on file    Frequency of Social Gatherings with Friends and Family: Not on file    Attends Yarsanism Services: Not on file    Active Member of [address] [place] [address] or Organizations: Not on file    Attends Club or Organization Meetings: Not on file    Marital Status: Not on file   Intimate Partner Violence:     Fear of Current or Ex-Partner: Not on file    Emotionally Abused: Not on file    Physically Abused: Not on file    Sexually Abused: Not on file   Housing Stability:     Unable to Pay for Housing in the Last Year: Not on file    Number of [place] in the Last Year: Not on file    Unstable Housing in the Last Year: Not on file       Allergies:  No Known Allergies    Current Medications:  Current Facility-Administered Medications   Medication Dose Route Frequency Provider Last Rate Last Admin    amLODIPine (NORVASC) tablet 10 mg  10 mg Oral Daily Urszula De Luna MD        docusate sodium (COLACE) capsule 100 mg  100 mg Oral BID Urszula De Luna MD        ferrous sulfate (IRON 325) tablet 325 mg  325 mg Oral Daily with breakfast Urszula De Luna MD   325 mg at 05/01/22 1105    furosemide (LASIX) tablet 40 mg  40 mg Oral Daily Urszula De Luna MD   40 mg at 05/01/22 1105    lisinopril (PRINIVIL;ZESTRIL) tablet 20 mg  20 mg Oral Daily Urszula De Luna MD        metoprolol tartrate (LOPRESSOR) tablet 100 mg  100 mg Oral TID Urszula De Luna MD        mirtazapine (REMERON) tablet 7.5 mg  7.5 mg Oral Nightly Urszula De Luna MD        PARoxetine (PAXIL) tablet 20 mg  20 mg Oral Daily Urszula De Luna MD   20 mg at 05/01/22 1141    rosuvastatin (CRESTOR) tablet 10 mg  10 mg Oral Nightly Urszula De Luna MD        cholestyramine light packet 4 g  4 g Oral Daily Urszula De Luna MD   4 g at 05/01/22 1141    sodium chloride flush 0.9 % injection 5-40 mL  5-40 mL IntraVENous 2 times per day Urszula De Luna MD   10 mL at 05/01/22 1125    sodium chloride flush 0.9 % injection 5-40 mL  5-40 mL IntraVENous PRN Urszula De Luna MD        0.9 % sodium chloride infusion   IntraVENous PRN Urszula De Luna MD        ondansetron (ZOFRAN-ODT) disintegrating tablet 4 mg  4 mg Oral Q8H PRN Urszula De Luna MD        Or    ondansetron (ZOFRAN) injection 4 mg  4 mg IntraVENous Q6H PRN Urszula De Luna MD        polyethylene glycol (GLYCOLAX) packet 17 g  17 g Oral Daily PRN Urszula De Luna MD        acetaminophen (TYLENOL) tablet 650 mg  650 mg Oral Q6H PRN Urszula De Luna MD        Or    acetaminophen (TYLENOL) suppository 650 mg  650 mg Rectal Q6H PRN Urszula De Luna MD        morphine (PF) injection 1 mg  1 mg IntraVENous Q3H PRN Urszula De Luna MD          sodium chloride         Physical Exam:  /62   Pulse 52 Temp 97.1 °F (36.2 °C) (Temporal)   Resp 18   Ht 5' 4\" (1.626 m)   Wt 153 lb (69.4 kg)   SpO2 94%   BMI 26.26 kg/m²   Weight change: Wt Readings from Last 3 Encounters:   05/01/22 153 lb (69.4 kg)   04/08/22 171 lb 6.4 oz (77.7 kg)   03/25/22 166 lb (75.3 kg)         General: Awake, alert, oriented x3, no acute distress  HEENT: Unremarkable  Neck: No JVD or bruits. Cardiac: Regular rate and rhythm, normal S1 and S2, no extra heart sounds, murmurs, heaves, thrills  Resp: Lungs clear without wheezing or crackles. No accessory muscle use or retraction  Abdomen: soft, nontender, nondistended, no gross organomegaly or mass  Skin: Warm and dry, no cyanosis. Musculoskeletal: normal tone and strength in the upper and lower extremities bilaterally  Neuro: Grossly unremarkable  Psych: Cooperative, and normal affect    Intake/Output:    Intake/Output Summary (Last 24 hours) at 5/1/2022 1455  Last data filed at 5/1/2022 1442  Gross per 24 hour   Intake 130 ml   Output 500 ml   Net -370 ml     I/O this shift:  In: 120 [P.O.:120]  Out: 500 [Urine:500]    Laboratory Tests:  Lab Results   Component Value Date    CREATININE 1.2 05/01/2022    BUN 19 05/01/2022     05/01/2022    K 4.0 05/01/2022     05/01/2022    CO2 23 05/01/2022     No results for input(s): CKTOTAL, CKMB in the last 72 hours.     Invalid input(s): TROPONONI  No results found for: BNP  Lab Results   Component Value Date    WBC 10.9 05/01/2022    RBC 4.22 05/01/2022    HGB 12.4 05/01/2022    HCT 38.9 05/01/2022    MCV 92.2 05/01/2022    MCH 29.4 05/01/2022    MCHC 31.9 05/01/2022    RDW 13.9 05/01/2022     05/01/2022    MPV 10.4 05/01/2022     Recent Labs     05/01/22  0408   ALKPHOS 91   ALT 16   AST 16   PROT 7.1   BILITOT 0.6   LABALBU 3.0*     Lab Results   Component Value Date    MG 2.1 04/07/2022     Lab Results   Component Value Date    PROTIME 13.7 05/01/2022    INR 1.2 05/01/2022     No results found for: TSH  No components found for: CHLPL  Lab Results   Component Value Date    TRIG 72 04/13/2022    TRIG 69 04/03/2022    TRIG 89 01/18/2021     Lab Results   Component Value Date    HDL 29 04/13/2022    HDL 40 04/03/2022    HDL 51 01/18/2021     Lab Results   Component Value Date    LDLCALC 59 04/13/2022    LDLCALC 62 04/03/2022    LDLCALC 74 01/18/2021     Lab Results   Component Value Date    INR 1.2 05/01/2022       Admission ECG May 1, 2022 at 3:42 AM personally reviewed reviewed by me revealed normal sinus rhythm 93 DE interval 226 ms  ms. EKG done this afternoon when I was in the room at approximately 1430 shows sinus bradycardia heart rate of 47 no ischemic change. ASSESSMENT / PLAN:    1. Right-sided chest symptoms by examination or by history nonanginal and musculoskeletal likely. He did fall 2 days prior to this and no indication at this time of significant diaphoresis. He does have more pain if he moves around or lifts his right arm. #2 type B aortic dissection and continue to optimize blood pressure and lipids long-term. #3 hypertension and blood pressure under good control even with current musculoskeletal pain. Continues on amlodipine 10 mg daily, lisinopril 20 mg daily, metoprolol tartrate 100 mg 3 times a day which certainly could cause some bradycardia at times. Follow-up heart rate on telemetry. #4 hyperlipidemia continues on rosuvastatin 10 mg daily. #5 preoperative cardiovascular assessment and if he needs any aggressive surgical intervention of aorta certainly would be increased risk just based on being 80years old and currently somewhat debilitated.             Electronically signed by Tilmon Moritz, MD on 5/1/2022 at 2:55 PM

## 2022-05-01 NOTE — ED NOTES
Radiology Procedure Waiver   Name: Christian Montague  : 1935  MRN: [id number]    Date:  22    Time: 3:49 AM EDT    Benefits of immediately proceeding with Radiology exam(s) without pre-testing outweigh the risks or are not indicated as specified below and therefore the following is/are being waived:    [] Pregnancy test   [] Patients LMP on-time and regular.   [] Patient had Tubal Ligation or has other Contraception Device. [] Patient  is Menopausal or Premenarcheal.    [] Patient had Full or Partial Hysterectomy. [] Protocol for Iodine allergy    [] MRI Questionnaire     [x] BUN/Creatinine   [] Patient age w/no hx of renal dysfunction. [] Patient on Dialysis. [] Recent Normal Labs.   Electronically signed by Joe Owens DO on 22 at 3:49 AM EDT               Joe Owens DO  [id number]

## 2022-05-01 NOTE — ED NOTES
Pt c/o severe sudden onset back pain. States he was just seen for an aortic dissection and was being treated medically. Pt denies SOB or CP.       Lesly Schultz RN  05/01/22 0989

## 2022-05-01 NOTE — FLOWSHEET NOTE
Patient to the ED from home by EMS for Right rib pain that started 7 hrs ago. Patient states that he was seen recently for the same pain but tonight the pain is more intense.

## 2022-05-01 NOTE — ED PROVIDER NOTES
HPI:  5/1/22,   Time: 3:46 AM EDT       Adrian Hernandez is a 80 y.o. male presenting to the ED for chest pain and back pain, beginning 1 hours ago. The complaint has been persistent, moderate in severity, and worsened by nothing. Patient states that he was recently in the hospital.  He was diagnosed with a descending thoracic aortic aneurysm. The patient states that he went to a rehab facility after being treated medically. The patient states that he got out of the rehab facility on Friday. He states that tonight while at home he had sudden onset chest pain and back pain that he describes as sharp stabbing pain. He states is very similar to the pain he had the previous time he was admitted. His symptoms have been persistent therefore he came to the ED to be evaluated. Denies any nausea vomiting diarrhea. No abdominal pain. No fevers or chills. No numbness tingling. No headaches or vision changes. No shortness of breath. Review of Systems:   Pertinent positives and negatives are stated within HPI, all other systems reviewed and are negative.          --------------------------------------------- PAST HISTORY ---------------------------------------------  Past Medical History:  has a past medical history of Acute pancreatitis, Anxiety, BPH (benign prostatic hyperplasia), CAD (coronary artery disease), GERD (gastroesophageal reflux disease), Hyperlipidemia, Obsessive compulsive disorder, and Psoriasis. Past Surgical History:  has a past surgical history that includes Colonoscopy (03/14/2017); Upper gastrointestinal endoscopy (03/14/2017); Throat surgery; and Cataract removal (Bilateral, 08/24/2017). Social History:  reports that he quit smoking about 52 years ago. His smoking use included cigarettes. He has never used smokeless tobacco. He reports current alcohol use. He reports that he does not use drugs.     Family History: family history includes Asthma in his mother; Coronary Art Dis in his mother; Parkinsonism in his father. The patients home medications have been reviewed. Allergies: Patient has no known allergies. ---------------------------------------------------PHYSICAL EXAM--------------------------------------    Constitutional/General: Alert and oriented x3, NAD  Head: Normocephalic and atraumatic  Eyes: PERRL, EOMI, conjunctive normal, sclera non icteric  Mouth: Oropharynx clear, handling secretions, no trismus, no asymmetry of the posterior oropharynx or uvular edema  Neck: Supple, full ROM, non tender to palpation in the midline, no stridor, no crepitus, no meningeal signs  Respiratory: Lungs clear to auscultation bilaterally, no wheezes, rales, or rhonchi. Not in respiratory distress  Cardiovascular:  Regular rate. Regular rhythm. No murmurs, gallops, or rubs. 2+ distal pulses  GI:  Abdomen Soft, Non tender, Non distended. +BS. No organomegaly, no palpable masses,  No rebound, guarding, or rigidity. Musculoskeletal: Moves all extremities x 4. Warm and well perfused, no clubbing, cyanosis, or edema. Capillary refill <3 seconds  Integument: skin warm and dry. No rashes. Neurologic: GCS 15, no focal deficits, symmetric strength 5/5 in the upper and lower extremities bilaterally  Psychiatric: Normal Affect    -------------------------------------------------- RESULTS -------------------------------------------------  I have personally reviewed all laboratory and imaging results for this patient. Results are listed below.      LABS:  Results for orders placed or performed during the hospital encounter of 05/01/22   CBC with Auto Differential   Result Value Ref Range    WBC 10.9 4.5 - 11.5 E9/L    RBC 4.22 3.80 - 5.80 E12/L    Hemoglobin 12.4 (L) 12.5 - 16.5 g/dL    Hematocrit 38.9 37.0 - 54.0 %    MCV 92.2 80.0 - 99.9 fL    MCH 29.4 26.0 - 35.0 pg    MCHC 31.9 (L) 32.0 - 34.5 %    RDW 13.9 11.5 - 15.0 fL    Platelets 476 585 - 984 E9/L    MPV 10.4 7.0 - 12.0 fL    Neutrophils % 57.1 43.0 - 80.0 %    Immature Granulocytes % 0.4 0.0 - 5.0 %    Lymphocytes % 26.1 20.0 - 42.0 %    Monocytes % 12.1 (H) 2.0 - 12.0 %    Eosinophils % 3.7 0.0 - 6.0 %    Basophils % 0.6 0.0 - 2.0 %    Neutrophils Absolute 6.21 1.80 - 7.30 E9/L    Immature Granulocytes # 0.04 E9/L    Lymphocytes Absolute 2.83 1.50 - 4.00 E9/L    Monocytes Absolute 1.31 (H) 0.10 - 0.95 E9/L    Eosinophils Absolute 0.40 0.05 - 0.50 E9/L    Basophils Absolute 0.07 0.00 - 0.20 E9/L   Comprehensive Metabolic Panel w/ Reflex to MG   Result Value Ref Range    Sodium 135 132 - 146 mmol/L    Potassium reflex Magnesium 4.0 3.5 - 5.0 mmol/L    Chloride 101 98 - 107 mmol/L    CO2 23 22 - 29 mmol/L    Anion Gap 11 7 - 16 mmol/L    Glucose 110 (H) 74 - 99 mg/dL    BUN 19 6 - 23 mg/dL    CREATININE 1.2 0.7 - 1.2 mg/dL    GFR Non-African American 57 >=60 mL/min/1.73    GFR African American >60     Calcium 9.0 8.6 - 10.2 mg/dL    Total Protein 7.1 6.4 - 8.3 g/dL    Albumin 3.0 (L) 3.5 - 5.2 g/dL    Total Bilirubin 0.6 0.0 - 1.2 mg/dL    Alkaline Phosphatase 91 40 - 129 U/L    ALT 16 0 - 40 U/L    AST 16 0 - 39 U/L   Lipase   Result Value Ref Range    Lipase 45 13 - 60 U/L   Troponin   Result Value Ref Range    Troponin, High Sensitivity 27 (H) 0 - 11 ng/L   Protime-INR   Result Value Ref Range    Protime 13.7 (H) 9.3 - 12.4 sec    INR 1.2    APTT   Result Value Ref Range    aPTT 27.5 24.5 - 35.1 sec   Troponin   Result Value Ref Range    Troponin, High Sensitivity 27 (H) 0 - 11 ng/L       RADIOLOGY:  Interpreted by Radiologist.  XR CHEST PORTABLE   Final Result   No acute findings. CTA CHEST W CONTRAST   Final Result   Again noted are findings consistent with an aneurysm involving the descending   thoracic aorta, which is partially thrombosed. When compared to the previous   study, the previously seen features of an acute dissecting aneurysm are not   visualized.   However, there is a 1.7 cm region of focal outpouching of   contrast, suggestive of a pseudoaneurysm, with small penetrating ulcer seen. No definitive evidence of acute aortic intramural hematoma is seen on the   noncontrast images. Measurements are as above. No acute intrathoracic findings otherwise. Chronic interstitial/fibrotic   changes involving the lungs. Please refer to the CT of the abdomen and pelvis for findings regarding the   abdominal aorta. CTA ABDOMEN PELVIS W CONTRAST   Final Result   Essentially stable CT angiogram of the abdomen and pelvis, as detailed above,   again demonstrating mild aneurysmal dilatation at the level of the aortic   hiatus, without dissection. 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      Additional findings as above, not significantly changed since the previous   exam.             EKG:  This EKG is signed and interpreted by the EP. EKG shows sinus rhythm with first-degree AV block at 93 bpm.  Left axis deviation. Prolonged QT. Nonspecific ST-T wave changes. No STEMI.      ------------------------- NURSING NOTES AND VITALS REVIEWED ---------------------------   The nursing notes within the ED encounter and vital signs as below have been reviewed by myself. /62   Pulse 90   Temp 98.2 °F (36.8 °C)   Resp 21   Ht 5' 4\" (1.626 m)   Wt 153 lb (69.4 kg)   SpO2 95%   BMI 26.26 kg/m²   Oxygen Saturation Interpretation: Normal    The patients available past medical records and past encounters were reviewed.         ------------------------------ ED COURSE/MEDICAL DECISION MAKING----------------------  Medications   iopamidol (ISOVUE-370) 76 % injection 100 mL (100 mLs IntraVENous Given 5/1/22 4436)   sodium chloride flush 0.9 % injection 10 mL (10 mLs IntraVENous Given 5/1/22 6845)   fentaNYL (SUBLIMAZE) injection 25 mcg (25 mcg IntraVENous Given 5/1/22 8520)         ED COURSE:       Medical Decision Making: This is a an 70-year-old male presenting to the ED for chest pain and back pain. Patient underwent laboratory work-up which showed a normal CBC except for hemoglobin 12.4. Normal chemistry. Normal lipase. Troponins were 27x2. Negative delta. EKG showed nonspecific findings. Repeat CTA shows stable abdomen pelvis but in the chest the patient was noted to have a partially thrombosed portion of the aneurysm involving the descending thoracic aorta. There is also a new 1.7 cm region of focal outpouching concerning for pseudoaneurysm. I did speak to Dr. Mar Celestin with vascular surgery who reviewed the images himself. He recommends admission. They will consult on the patient. He asked that the surgical resident come see the patient. Surgical resident consulted. I then spoke to sound who is excepted the patient for admission. Currently after 25 mcg of fentanyl the patient is asymptomatic. Blood pressure stable at 116/62. I, Dr. Elia Jimenes, am the primary provider for this encounter    This patient's ED course included: a personal history and physicial examination, re-evaluation prior to disposition, multiple bedside re-evaluations, IV medications, cardiac monitoring and continuous pulse oximetry    This patient has remained hemodynamically stable during their ED course. Re-Evaluations:             Re-evaluation. Patients symptoms are improving      Counseling: The emergency provider has spoken with the patient and discussed todays results, in addition to providing specific details for the plan of care and counseling regarding the diagnosis and prognosis. Questions are answered at this time and they are agreeable with the plan.       --------------------------------- IMPRESSION AND DISPOSITION ---------------------------------    IMPRESSION  1.  Aortic aneurysm, descending ([facility] [address]        DISPOSITION  Disposition: Admit to telemetry  Patient condition is stable    NOTE: This report was transcribed using voice recognition software.  Every effort was made to ensure accuracy; however, inadvertent computerized transcription errors may be present        Attila Peraza DO  05/01/22 [id number]

## 2022-05-01 NOTE — PROGRESS NOTES
[id number] [facility] cardiology consult sent via [facility]    Per [phone] [facility] NP, patient known to [address]. Cardiology consult called to answering service- Dr Hedrick Other on call.

## 2022-05-01 NOTE — H&P
Hospital Medicine History & Physical      PCP: Angel Askew DO    Date of Admission: 5/1/2022    Date of Service:  5/1/2022    Chief Complaint:  Back pain      History Of Present Illness:    Jennifer Gabriel is a 80 y.o. male presenting to the ED for chest pain and back pain, beginning 1 hours ago. 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 The patient states that he went to a rehab facility after being treated medically. The patient states that he got out of the rehab facility on Friday. He states that tonight while at home he had sudden onset chest pain and back pain that he describes as sharp stabbing pain. He states is very similar to the pain he had the previous time he was admitted. CTA chest and abd showed:  Again noted are findings consistent with an aneurysm involving the descending  thoracic aorta, which is partially thrombosed.  When compared to the previous  study, the previously seen features of an acute dissecting aneurysm are not  visualized.  However, there is a 1.7 cm region of focal outpouching of  contrast, suggestive of a pseudoaneurysm, with small penetrating ulcer seen. No definitive evidence of acute aortic intramural hematoma is seen on the  noncontrast images.      Essentially stable CT angiogram of the abdomen and pelvis, as detailed above,  again demonstrating mild aneurysmal dilatation at the level of the aortic  hiatus, without dissection.  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    Vascular surgery was consulted from the ED who recommended admission patient for work-up and cardiac clearance for possible surgery. During my interview patient is comfortable he received fentanyl in the ED and his back pain and chest pain is completely resolved.   Currently denies any complaints, he has no abdominal pain nausea or vomiting denies any chest pain or shortness of breath, headache visual changes dizziness weakness or numbness or tingling in the extremities. Past Medical History:          Diagnosis Date    Acute pancreatitis     6/06 AND 11/09    Anxiety     BPH (benign prostatic hyperplasia)     CAD (coronary artery disease)     GERD (gastroesophageal reflux disease)     Hyperlipidemia     Obsessive compulsive disorder     Psoriasis        Past Surgical History:          Procedure Laterality Date    CATARACT REMOVAL Bilateral 08/24/2017    COLONOSCOPY  03/14/2017    THROAT SURGERY      UPPER GASTROINTESTINAL ENDOSCOPY  03/14/2017       Medications Prior to Admission:      Prior to Admission medications    Medication Sig Start Date End Date Taking?  Authorizing Provider   ferrous sulfate (IRON 325) 325 (65 Fe) MG tablet Take 1 tablet by mouth daily (with breakfast) 4/9/22   Severiano Adamson MD   lisinopril (PRINIVIL;ZESTRIL) 20 MG tablet Take 1 tablet by mouth daily 4/9/22   Severiano Adamson MD   amLODIPine [facility]) 10 MG tablet Take 1 tablet by mouth daily 4/9/22 5/9/22  Severiano Adamson MD   docusate sodium (COLACE) 100 MG capsule Take 1 capsule by mouth 2 times daily 4/8/22 5/8/22  Severiano Adamson MD   furosemide (LASIX) 40 MG tablet Take 1 tablet by mouth daily 4/9/22   Severiano Adamson MD   metoprolol tartrate (LOPRESSOR) 100 MG tablet Take 1 tablet by mouth in the morning, at noon, and at bedtime 4/8/22 5/8/22  Severiano Adamson MD   polyethylene glycol (GLYCOLAX) 17 g packet Take 17 g by mouth daily as needed for Constipation 4/8/22 5/8/22  Severiano Adamson MD   mirtazapine (REMERON) 7.5 MG tablet Take 1 tablet by mouth nightly 3/29/22   Jesus Kenney DO   ACIDOPHILUS LACTOBACILLUS PO Take by mouth in the morning and at bedtime    Historical Provider, MD   Bismuth Subsalicylate (PEPTO-BISMOL PO) Take by mouth as needed    Historical Provider, MD   colesevelam (WELCHOL) 625 MG tablet Take 1 tablet by mouth 2 times daily (with meals) 3/25/22   Bruce D Anola Fleischer, DO   rosuvastatin (CRESTOR) 10 MG tablet TAKE 1 TABLET BY MOUTH AT  NIGHT 1/3/22   Jesus Kenney, DO   PARoxetine (PAXIL) 20 MG tablet TAKE 1 TABLET BY MOUTH ONCE DAILY 8/23/21   Sarah Kelly, DO   Multiple Vitamins-Minerals (VITEYES AREDS FORMULA/LUTEIN PO) Take by mouth    Historical Provider, MD       Allergies:  Patient has no known allergies. Social History:      The patient currently lives home    TOBACCO:   reports that he quit smoking about 52 years ago. His smoking use included cigarettes. He has never used smokeless tobacco.  ETOH:   reports current alcohol use. Family History:      Reviewed in detail and negative for DM, CAD, Cancer, CVA. Positive as follows:        Problem Relation Age of Onset   Tracie Gonzalez Asthma Mother     Coronary Art Dis Mother     Parkinsonism Father        REVIEW OF SYSTEMS:   Pertinent positives as noted in the HPI. All other systems reviewed and negative. PHYSICAL EXAM:    /75   Pulse 58   Temp 97 °F (36.1 °C) (Temporal)   Resp 22   Ht 5' 4\" (1.626 m)   Wt 153 lb (69.4 kg)   SpO2 100%   BMI 26.26 kg/m²     General appearance:  No apparent distress, appears stated age and cooperative. HEENT:  Normal cephalic, atraumatic without obvious deformity. Pupils equal, round, and reactive to light. Extra ocular muscles intact. Conjunctivae/corneas clear. Neck: Supple, with full range of motion. No jugular venous distention. Trachea midline. Respiratory:  Normal respiratory effort. Clear to auscultation, bilaterally without Rales/Wheezes/Rhonchi. Cardiovascular:  Regular rate and rhythm with normal S1/S2 without murmurs, rubs or gallops. Abdomen: Soft, non-tender, non-distended with normal bowel sounds. Musculoskeletal:  No clubbing, cyanosis or edema bilaterally. Full range of motion without deformity. Skin: Skin color, texture, turgor normal.  No rashes or lesions.   Neurologic:  Neurovascularly intact without any focal sensory/motor deficits. Cranial nerves: II-XII intact, grossly non-focal.  Psychiatric:  Alert and oriented, thought content appropriate, normal insight      Labs:     Recent Labs     05/01/22 0408   WBC 10.9   HGB 12.4*   HCT 38.9        Recent Labs     05/01/22 0408      K 4.0      CO2 23   BUN 19   CREATININE 1.2   CALCIUM 9.0     Recent Labs     05/01/22 0408   AST 16   ALT 16   BILITOT 0.6   ALKPHOS 91     Recent Labs     05/01/22 0408   INR 1.2     No results for input(s): Benji Oliva in the last 72 hours. Urinalysis:      Lab Results   Component Value Date    NITRU Negative 05/16/2019    BLOODU Negative 05/16/2019    SPECGRAV <=1.005 05/16/2019    GLUCOSEU Negative 05/16/2019         ASSESSMENT:    Active Hospital Problems    Diagnosis Date Noted    Aortic dissection ([facility] [address] [I71.00] 05/01/2022     Priority: Medium     Assessment/plan      --aortic dissection with pseudoaneurysm . recently discharged from the hospital with known aortic dissection, now with new finding suggestive of pseudoaneurysm . vascular surgery    consulted . C/w   anti-hypertensive mgmt- Goal BP SBP<120, cardiac clearance for possible surgery          --HTN: amlodipine, lasix, lisinopril, lopressor    --GERD: Protonix    --Psoriasis: Patient follows with dermatology OP    -- Anemia: hb 12.8 , stable.      --HLD: C/w crestor    --Depression: C/w remeron and paxil.    --DVT proph:Scds ordered.     --Constipation: colace ordered.  miralax prn.         DVT Prophylaxis:   Diet: ADULT DIET; Regular; Low Fat/Low Chol/High Fiber/2 gm Na  Code Status: DNR-CCA      Dispo -admit . Urszula De Luna MD  5/1/2022    Thank you Randall Carter DO for the opportunity to be involved in this patient's care. If you have any questions or concerns please feel free to contact me through [address] [place] [address].

## 2022-05-02 PROBLEM — E44.0 MODERATE PROTEIN-CALORIE MALNUTRITION (HCC): Chronic | Status: ACTIVE | Noted: 2022-05-02

## 2022-05-02 LAB
EKG ATRIAL RATE: 48 BPM
EKG P AXIS: 48 DEGREES
EKG P-R INTERVAL: 268 MS
EKG Q-T INTERVAL: 468 MS
EKG QRS DURATION: 100 MS
EKG QTC CALCULATION (BAZETT): 418 MS
EKG R AXIS: -22 DEGREES
EKG T AXIS: 3 DEGREES
EKG VENTRICULAR RATE: 48 BPM

## 2022-05-02 PROCEDURE — 6370000000 HC RX 637 (ALT 250 FOR IP): Performed by: FAMILY MEDICINE

## 2022-05-02 PROCEDURE — 99233 SBSQ HOSP IP/OBS HIGH 50: CPT

## 2022-05-02 PROCEDURE — 97530 THERAPEUTIC ACTIVITIES: CPT

## 2022-05-02 PROCEDURE — 2580000003 HC RX 258: Performed by: FAMILY MEDICINE

## 2022-05-02 PROCEDURE — G0378 HOSPITAL OBSERVATION PER HR: HCPCS

## 2022-05-02 PROCEDURE — 2140000000 HC CCU INTERMEDIATE R&B

## 2022-05-02 PROCEDURE — 97165 OT EVAL LOW COMPLEX 30 MIN: CPT

## 2022-05-02 PROCEDURE — 97161 PT EVAL LOW COMPLEX 20 MIN: CPT

## 2022-05-02 PROCEDURE — 97535 SELF CARE MNGMENT TRAINING: CPT

## 2022-05-02 RX ADMIN — Medication 10 ML: at 08:45

## 2022-05-02 RX ADMIN — METOPROLOL TARTRATE 100 MG: 50 TABLET, FILM COATED ORAL at 20:34

## 2022-05-02 RX ADMIN — Medication 10 ML: at 20:35

## 2022-05-02 RX ADMIN — ROSUVASTATIN 10 MG: 20 TABLET, FILM COATED ORAL at 21:31

## 2022-05-02 RX ADMIN — CHOLESTYRAMINE 4 G: 4 POWDER, FOR SUSPENSION ORAL at 08:42

## 2022-05-02 RX ADMIN — MIRTAZAPINE 7.5 MG: 15 TABLET, FILM COATED ORAL at 20:34

## 2022-05-02 RX ADMIN — FERROUS SULFATE TAB 325 MG (65 MG ELEMENTAL FE) 325 MG: 325 (65 FE) TAB at 08:43

## 2022-05-02 RX ADMIN — PAROXETINE 20 MG: 20 TABLET, FILM COATED ORAL at 08:43

## 2022-05-02 RX ADMIN — FUROSEMIDE 40 MG: 40 TABLET ORAL at 08:43

## 2022-05-02 RX ADMIN — METOPROLOL TARTRATE 100 MG: 50 TABLET, FILM COATED ORAL at 15:41

## 2022-05-02 RX ADMIN — DOCUSATE SODIUM 100 MG: 100 CAPSULE ORAL at 08:43

## 2022-05-02 NOTE — PROGRESS NOTES
VASCULAR SURGERY PROGRESS NOTE    Pt is being seen in f/u today regarding aortic dissection    SUBJECTIVE  Pt s/e. Feels well. No more pain. Tolerating diet  BP & HR well controlled    CURRENT MEDICATIONS    sodium chloride        sodium chloride flush, sodium chloride, ondansetron **OR** ondansetron, polyethylene glycol, acetaminophen **OR** acetaminophen, morphine    amLODIPine  10 mg Oral Daily    docusate sodium  100 mg Oral BID    ferrous sulfate  325 mg Oral Daily with breakfast    furosemide  40 mg Oral Daily    lisinopril  20 mg Oral Daily    metoprolol  100 mg Oral TID    mirtazapine  7.5 mg Oral Nightly    PARoxetine  20 mg Oral Daily    rosuvastatin  10 mg Oral Nightly    cholestyramine light  4 g Oral Daily    sodium chloride flush  5-40 mL IntraVENous 2 times per day        PHYSICAL EXAM   /60   Pulse 50   Temp 98.4 °F (36.9 °C) (Temporal)   Resp 18   Ht 5' 4\" (1.626 m)   Wt 153 lb (69.4 kg)   SpO2 97%   BMI 26.26 kg/m²     Intake/Output Summary (Last 24 hours) at 5/2/2022 0650  Last data filed at 5/2/2022 0554  Gross per 24 hour   Intake 300 ml   Output 1150 ml   Net -850 ml          GENERAL:  NAD. A&Ox3. LUNGS:  No increased work of breathing. CARDIOVASCULAR: RR  ABDOMEN:  Soft, non-distended, non-tender. No guarding, rigidity, rebound. EXTREMITIES:   MAEx4. Atraumatic. Palpable b/l distal pulses.     LABS    Lab Results   Component Value Date    WBC 10.9 05/01/2022    HGB 12.4 (L) 05/01/2022    HCT 38.9 05/01/2022     05/01/2022    PROTIME 13.7 (H) 05/01/2022    INR 1.2 05/01/2022    APTT 27.5 05/01/2022    K 4.0 05/01/2022    BUN 19 05/01/2022    CREATININE 1.2 05/01/2022       ASSESSMENT/PLAN  80 y.o. male with type B aortic dissection now with thrombosed lumen, concern for possible penetrating aortic ulcer, improved back pain likely related to recent fall    No current complaints of pain  Heart rate and BP well controlled  Seen by cardiology -- increased risk for aortic intervention based on age & debilitated status  Will discuss plan with attending    Karie Geiger DO  Resident, PGY-4  5/2/2022  6:50 AM    Pt see and examined  No pain   Reviewed imaging with my partner  - less likely aleks  - more likely resolving dissection    No plan for vascular intervention    Appreciate card input    Ok for dc  Disc with pt and charge nurse    Prashanth Major MD

## 2022-05-02 NOTE — CARE COORDINATION
Met with pt to discuss discharge planning/transition of care. Pt lives alone in a 2 story home, bedroom upstairs. Pt has 3 walkers, 2 canes, and a shower chair(tub has grab bars). Pt was recently discharged from hospital 4/8/22 and went to [facility] and was discharged from [facility] on 4/29. [facility]. Pt complaining about side pain. Pt admitted for aortic dissection, vascular and cardiology on board. Called #[id number] for STAT evals, discussed JUANCARLOS, pt declining and would prefer home with hhc. Pt has had [facility] in past and would like to use again. Referral made to at [facility], [facility]. [facility], await call back if accepted. Will need hhc orders at discharge. Pt states that niece Parag Lewis is primary and nephew Rock Lee is secondary contact. Chele Dunaway who states that family will continue to check on pt at home, will transport pt home at discharge. Katie Reddy, MSW, LSW

## 2022-05-02 NOTE — PROGRESS NOTES
Comprehensive Nutrition Assessment    Type and Reason for Visit:  Initial,Positive Nutrition Screen    Nutrition Recommendations/Plan:   1. Recommend and start Ensure high protein supplement daily and Boost Pudding daily to help meet increased nutritional needs and d/t decreased po intake of meals. Malnutrition Assessment:  Malnutrition Status: Moderate malnutrition (05/02/22 1310)    Context:  Chronic Illness     Findings of the 6 clinical characteristics of malnutrition:  Energy Intake:  75% or less estimated energy requirements for 1 month or longer  Weight Loss:  Unable to assess (d/t lack of actual weights available upon admission)     Body Fat Loss:  Mild body fat loss Orbital,Triceps   Muscle Mass Loss:  Mild muscle mass loss Temples (temporalis),Clavicles (pectoralis & deltoids)  Fluid Accumulation:  No significant fluid accumulation     Strength:  Not Performed    Nutrition Assessment:    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 Current BMI (kg/m2): 26.2  Usual Body Weight:  (EMR shows past weights of 171# bedscale on 4/2/22 and 182# actual on 11/23/21)                       BMI Categories: Overweight (BMI 25.0-29. 9)    Estimated Daily Nutrient Needs:  Energy Requirements Based On: Formula  Weight Used for Energy Requirements: Current  Energy (kcal/day): 5744-3188 (REE 1286 x 1.2 SF)  Weight Used for Protein Requirements: Ideal  Protein (g/day): 70-85 (1.2-1.4g/kg IBW)  Method Used for Fluid Requirements: 1 ml/kcal  Fluid (ml/day): 7019-3027    Nutrition Diagnosis:   · Moderate malnutrition,In context of chronic illness related to cardiac dysfunction (hx of CAD) as evidenced by poor intake prior to admission,mild muscle loss,mild loss of subcutaneous fat      Nutrition Interventions:   Food and/or Nutrient Delivery: Continue Current Diet,Start Oral Nutrition Supplement  Nutrition Education/Counseling: Education not indicated  Coordination of Nutrition Care: No recommendation at this time       Goals:  Previous Goal Met: Progressing toward Goal(s)  Goals: by next RD assessment,PO intake 50% or greater       Nutrition Monitoring and Evaluation:   Behavioral-Environmental Outcomes: None Identified  Food/Nutrient Intake Outcomes: Food and Nutrient Intake,Supplement Intake  Physical Signs/Symptoms Outcomes: Biochemical Data,Chewing or Swallowing,GI Status,Fluid Status or Edema,Hemodynamic Status,Meal Time Behavior,Skin,Nutrition Focused Physical Findings,Weight,Constipation    Discharge Planning:     Too soon to determine     Oksana Junior RD, LD  Contact: [phone]

## 2022-05-02 NOTE — PROGRESS NOTES
Hospitalist Progress Note      SYNOPSIS: Patient admitted on 2022 for Aortic dissection ([address]      SUBJECTIVE:  Patient denies new symptoms of chest pain or difficulty breathing    Temp (24hrs), Av.8 °F (36.6 °C), Min:97.5 °F (36.4 °C), Max:98.4 °F (36.9 °C)        OBJECTIVE: On room air 96%    BP (!) 102/59   Pulse 76   Temp 98.1 °F (36.7 °C) (Temporal)   Resp 18   Ht 5' 4\" (1.626 m)   Wt 153 lb (69.4 kg)   SpO2 96%   BMI 26.26 kg/m²     General appearance: Elderly male  Not diaphoretic appears comfortable  HEENT: Lips are average size no obvious swelling to lips or tongue. Mikala Fleming    Respiratory: Clear to auscultation bilaterally, unlabored respiratory effort at rest  Cardiovascular: Pulse reg audible S1-S2  Skin:  No petechiae or hives on inspection no papules on palpation  Neurologic: awake, alert and speech is clear      ASSESSMENT:    type B aortic dissection / pseudoaneurysm  hypertension blood   sinus bradycardia   Chest wall discomfort without typical anginal features    hyperlipidemia     PLAN:  Continue beta-blocker  Continue statin agent  Continue lisinopril  Continue amlodipine  Continue diuretic    Await further recommendations from vascular surgery    DISPOSITION: Patient requesting discharge to home at the conclusion of hospital stay    Medications:  REVIEWED DAILY    Infusion Medications    sodium chloride       Scheduled Medications    amLODIPine  10 mg Oral Daily    docusate sodium  100 mg Oral BID    ferrous sulfate  325 mg Oral Daily with breakfast    furosemide  40 mg Oral Daily    lisinopril  20 mg Oral Daily    metoprolol  100 mg Oral TID    mirtazapine  7.5 mg Oral Nightly    PARoxetine  20 mg Oral Daily    rosuvastatin  10 mg Oral Nightly    cholestyramine light  4 g Oral Daily    sodium chloride flush  5-40 mL IntraVENous 2 times per day     PRN Meds: sodium chloride flush, sodium chloride, ondansetron **OR** ondansetron, polyethylene glycol, acetaminophen **OR** acetaminophen, morphine    Labs: No outstanding blood cultures in the last 30-60 days  In epic EMR  Recent Labs     05/01/22 0408   WBC 10.9   HGB 12.4*   HCT 38.9          Recent Labs     05/01/22 0408      K 4.0      CO2 23   BUN 19   CREATININE 1.2   CALCIUM 9.0       Recent Labs     05/01/22 0408   PROT 7.1   ALKPHOS 91   ALT 16   AST 16   BILITOT 0.6   LIPASE 45       Recent Labs     05/01/22 0408   INR 1.2         Radiology: REVIEWED DAILY    +++++++++++++++++++++++++++++++++++++++++++++++++  Art Galvez DO  [address]  +++++++++++++++++++++++++++++++++++++++++++++++++  NOTE: This report was transcribed using voice recognition software. Every effort was made to ensure accuracy; however, inadvertent computerized transcription errors may be present.

## 2022-05-02 NOTE — PROGRESS NOTES
[address]  [address] [facility], [address]                                                  Patient Name: Keon Ordoñez  MRN: [id number]  : 1935  Room: [address] [place] [address]    Evaluating OT: RENAE Hoff, OTR/L  # [id number]    Referring Provider:  Jaiden Miller MD  Specific Provider Orders:  Phoenix Mcconnell and Treat\"22    Diagnosis: Aortic dissection ([facility] [I71.00]  Aortic aneurysm, descending ([facility] [I71.9]    Pt was admitted w/ Chest pain radiating to back, Recent Descending Thoracic, Aortic Aneurysm. Ashley Díaz at [facility] just prior to d/c home - landing on Right side    Pertinent Medical History:  Pt has a past medical history of Acute pancreatitis, Anxiety, BPH (benign prostatic hyperplasia), CAD (coronary artery disease), GERD (gastroesophageal reflux disease), Hyperlipidemia, Obsessive compulsive disorder, and Psoriasis. ,  has a past surgical history that includes Colonoscopy (2017); Upper gastrointestinal endoscopy (2017); Throat surgery; and Cataract removal (Bilateral, 2017).     Surgeries this admission: None - Surgical Procedure w/ Vascular This Admission pending clearance from Cardiology and further recommendations     Precautions:  Fall Risk  Limited ax, no exertion per vascular    Assessment of current deficits   [x] Functional mobility   [x]ADLs  [x] Strength               []Cognition   [x] Functional transfers   [x] IADLs         [x] Safety Awareness   [x]Endurance   [] Fine Coordination              [x] Balance      [] Vision/perception   []Sensation    []Gross Motor Coordination  [] ROM  [] Delirium                   [] Motor Control       OT PLAN OF CARE   OT POC based on physician orders, patient diagnosis and results of clinical assessment    Frequency/Duration 1-3 days/wk for 2 weeks PRN   Specific OT Treatment to include:   * Instruction/training on adapted ADL techniques and AE recommendations to increase functional independence within precautions       * Training on energy conservation strategies, correct breathing pattern and techniques to improve independence/tolerance for self-care routine  * Functional transfer/mobility training/DME recommendations for increased independence, safety, and fall prevention  * Patient/Family education to increase follow through with safety techniques and functional independence  * Recommendation of environmental modifications for increased safety with functional transfers/mobility and ADLs  * Therapeutic exercise to improve motor endurance, ROM, and functional strength for ADLs/functional transfers  * Therapeutic activities to facilitate/challenge dynamic balance, stand tolerance for increased safety and independence with ADLs  * Therapeutic activities to facilitate gross/fine motor skills for increased independence with ADLs  * Positioning to improve skin integrity, interaction with environment and functional independence  * Delirium prevention/treatment  Other:  * Neuro-muscular re-education: facilitation of righting/equilibrium reactions    Recommended Adaptive Equipment: TBD as pt progresses - Reacher      Home Living:  Pt lives alone in a 3-level house. Bed/bath/Laundry on the upper level. Rec Room and half bath on lower level, (+) Basement. Keeps a [employer] on each Floor. Uses SPC on the steps   Bathroom setup:  Tub-Shower, [address] [place] [address] Avenue [facility] owned:  [facility], [employer] (3), Raised Commode seat, Shower Chair, [address];  Emergency Call system to be established soon    Available Family Assist:  Nephews live ~ 10 miles away, can assist PRN, not 24/7    Prior Level of Function:  Pt reported being IND with all ADLs, Light IADLs, Transfers and Mobility using [employer] for ambulation.    Driving:  Yes  Occupation:  Retired - finance    Pain Level:  3/10 Pain Right Ribs/hip from recent fall, Mild Relief w/ Rest and Repositioning, Nsg Notified   Additional Complaints:  Mild SOB w/ ambulation    Cognition: A & O x 4   Able to Follow Multi-Step Commands INDly   Memory:  good    Sequencing:  good    Problem solving:  good    Judgement/safety:  good   Additional Comments:  Pt was pleasant and cooperative. Vitals/Lab Values:  WFL on room air      Functional Assessment:  AM-PAC Daily Activity Raw Score: 18/24     Initial Eval Status  Date: 5/2/22   Treatment Status  Date: STGs = LTGs  Time frame: 10-14 days   Feeding IND after set up      NA   Grooming SUP/Set up    Light ax seated EOB  Declined to stand at sink    Mod I  Standing at the [employer] A/Set up    [address] seated EOB    Mod I     LB Dressing Min A/Set up    Seated/standing  Pt ed for safe/adaptive techs, use of adaptive equip    Mod I     Bathing NT    Pt ed re: Benefits of use of Shower chair/Tub Bench, resources for obtaining equip    Mod I      Toileting NT    Pt declined    Mod I     Bed Mobility  Supine to sit: SUP   Sit to supine:  NT     VCs for safety    Supine to sit: IND  Sit to supine: IND     Functional Transfers Min A    EOB 2x, Chair  Pt ed for safety/hand placement    Mod I     Functional Mobility Min A    W/ [employer] short distances at b/s, household distances in room, timmons w/ PT, mild SOB  Pt ed for safety/improved safety awareness, walker safety    Mod I     Balance Sitting:     Static:  SUP    Dynamic:  SUP w/ functional ax EOB     Standing:     Static:  SUP w/ [employer]    Dynamic:  Min A w/ functional ax/mobility w/ [employer]    Sitting:     Static:  IND    Dynamic:  IND w/ functional ax    Standing:     Static:  Mod I w/ AD PRN    Dynamic:  Mod I w/ functional ax/mobility w/ AD PRN   Activity Tolerance Fair(+)      Good(-)   Visual/  Perceptual    Hearing:  WNL   Glasses: Yes    WFL   Hearing Aids:  No               Hand Dominance: Right   AROM Strength Additional Info:    RUE  WFL WNL Good ;   Good FMC/dexterity noted during ADL tasks     LUE WFL WNL Good ; Good FMC/dexterity noted during ADL tasks       Sensation:  Denies numbness or tingling Joselo UEs   Tone: WFL Joselo UEs   Edema: None Noted Joselo UEs     Comments: Upon arrival, patient was found in supine. He was agreeable to participate in therapeutic ax. No Family present during session. Received permission from RN prior to engaging pt in OT services. Educated pt on role of OT services. At the end of the session, patient was properly positioned in b/s chair. Call light and phone within reach, all lines and tubes intact. Oriented pt to call bell. Made all appropriate Environmental Modifications to facilitate pt's level of IND and safety. All needs met. Overall patient demonstrated decreased independence and safety during completion of ADL/functional transfer/mobility tasks. Pt would benefit from continued skilled OT to increase safety and independence with completion of ADL/IADL tasks for functional independence and quality of life.     Treatment: OT treatment provided this date includes:    Instruction/training on safety and adapted techniques for completion of ADLs, use of DME/AD/Adaptive equip: Reacher for LB dressing/Item retrieval from floor level    Instruction/training on safe functional mobility/transfer techniques, use of DME/AD:     Instruction/training on energy conservation techs (EC)/Pursed-Lip Breathing (PLB)/work simplification for completion of ADLs:      Neuromuscular Reeducation to facilitate balance/righting reactions for increased function with ADLs:     Skilled positioning/alignment for Pain Mgmt, Skin Integrity, Edema Control, to maximize Pt's safety and ability to [facility] interact w/ his/her environment, maximize respiratory status   Activity tolerance - Sitting/Standing to improve endurance w/ functional ax    Cognitive retraining -  Cues for safety/safety awareness    Skilled monitoring of pt's response to tx ax     Therapeutic Exercises- Instruction on BUE gentle ROM to improve strength and function of BUE for improved indep with ADLs     Consulted RN, PT     Made all appropriate Environmental Modifications to facilitate pt's level of IND and safety.  Recommendations for Continued Participation in OT services during Hospitalization and at D/C     Pt and/or Family verbalized/demonstrated a good understanding of education provided. Will Review PRN. Rehab Potential: Good(-) for established goals     Patient / Family Goal: Not stated at this time      Patient and/or family were instructed on functional diagnosis, prognosis/goals and OT plan of care. Demonstrated good understanding. Eval Complexity: Low    Time In: 1430  Time Out: 1505  Total Treatment Time: 10 minutes    Min Units   OT Eval Low 97165  X  1   OT Eval Medium 96702      OT Eval High 13667      OT Re-Eval [id number]       Therapeutic Ex 52361       Therapeutic Activities 65364       ADL/Self Care 78015  10  1   Orthotic Management 83102       Manual [id number]     Neuro Re-Ed 53167       Non-Billable Time              Evaluation Time additionally includes thorough review of current medical information, gathering information on past medical history/social history and prior level of function, completion of standardized testing/informal observation of tasks, assessment of data and education on plan of care and goals.             Richardine Severe, MOT, OTR/L  # [id number]

## 2022-05-02 NOTE — PLAN OF CARE
Problem: Skin/Tissue Integrity  Goal: Absence of new skin breakdown  Description: 1. Monitor for areas of redness and/or skin breakdown  2. Assess vascular access sites hourly  3. Every 4-6 hours minimum:  Change oxygen saturation probe site  4. Every 4-6 hours:  If on nasal continuous positive airway pressure, respiratory therapy assess nares and determine need for appliance change or resting period.   Outcome: Progressing     Problem: ABCDS Injury Assessment  Goal: Absence of physical injury  Outcome: Progressing     Problem: Safety - Adult  Goal: Free from fall injury  Outcome: Progressing

## 2022-05-02 NOTE — CARE COORDINATION
.Advance Care Planning   Healthcare Decision Maker:    Primary Decision Maker: octavio hector - Niece/Nephew - [phone]    Secondary Decision Maker: Donya Weber - Niece/Nephew - [phone]    Click here to complete Healthcare Decision Makers including selection of the Healthcare Decision Maker Relationship (ie \"Primary\").

## 2022-05-02 NOTE — PLAN OF CARE
Problem: Discharge Planning  Goal: Discharge to home or other facility with appropriate resources  Outcome: Progressing     Problem: Pain  Goal: Verbalizes/displays adequate comfort level or baseline comfort level  Outcome: Progressing     Problem: Skin/Tissue Integrity  Goal: Absence of new skin breakdown  Description: 1. Monitor for areas of redness and/or skin breakdown  2. Assess vascular access sites hourly  3. Every 4-6 hours minimum:  Change oxygen saturation probe site  4. Every 4-6 hours:  If on nasal continuous positive airway pressure, respiratory therapy assess nares and determine need for appliance change or resting period.   5/2/2022 1257 by Juan Palmer RN  Outcome: Progressing     Problem: ABCDS Injury Assessment  Goal: Absence of physical injury  5/2/2022 1257 by Juan Palmer RN  Outcome: Progressing     Problem: Safety - Adult  Goal: Free from fall injury  5/2/2022 1257 by Juan Palmer RN  Outcome: Progressing

## 2022-05-02 NOTE — PROGRESS NOTES
Physical Therapy  Physical Therapy Initial Assessment     Name: Nicolette Hernandez  : 1935  MRN: [id number]      Date of Service: 2022    Evaluating PT:  Donato Ramirez PT, DPT    Room #:  2462/0343-D  Diagnosis:  Aortic dissection ([address] [I71.00]  Aortic aneurysm, descending ([facility]) [I71.9]  PMHx/PSHx:  Psoriasis, BPH, CAD, HTN, IBS, GERD, aortic dissection  Procedure/Surgery:  N/A  Precautions:  Fall risk  Equipment Needs:  TBD    SUBJECTIVE:    Pt lives alone in a split level home home with 1 step to enter from garage. Bed/bath is on 3rd level, 4+8 steps with 1 handrail to access. Pt ambulated with ww PTA. OBJECTIVE:   Initial Evaluation  Date: 22 Treatment Short Term/ Long Term   Goals   AM-PAC 6 Clicks 46/53     Was pt agreeable to Eval/treatment? yes     Does pt have pain?  3/10 R side     Bed Mobility  Rolling: SBA  Supine to sit: SBA  Sit to supine: SBA  Scooting: SBA  Rolling: IND  Supine to sit: IND  Sit to supine: IND  Scooting: IND   Transfers Sit to stand: SBA  Stand to sit: SBA  Stand pivot: SBA with ww  Sit to stand: mod I  Stand to sit: mod I  Stand pivot: mod I with AAD   Ambulation    250' with ww SBA  300'+ with AAD mod I   Stair negotiation: ascended and descended  NT  8 steps with 1 handrail mod I     Strength/ROM:   BLE grossly 4/5  BLE AROM WFL    Balance:   Static Sitting: Supervision  Dynamic Sitting: SBA  Static Standing: Supervision with ww  Dynamic Standing: SBA with ww    Pt is A & O x 3  Sensation:  Pt denies numbness and tingling to extremities  Edema:  unremarkable    Therapeutic Exercises:    Bed mobility: supine<>sit, cued for EOB positioning  Transfers: STSx2, cued for hand placement and postural correction  Ambulation: 250' with ww  BLE AROM    Patient education  Pt educated on role of PT, importance of functional mobility during hospital stay, safety with functional mobility    Patient response to education:   Pt verbalized understanding Pt demonstrated skill Pt requires further education in this area   yes yes reinforce     ASSESSMENT:    Conditions Requiring Skilled Therapeutic Intervention:    [x]Decreased strength     []Decreased ROM  [x]Decreased functional mobility  [x]Decreased balance   [x]Decreased endurance   [x]Decreased posture  []Decreased sensation  []Decreased coordination   []Decreased vision  []Decreased safety awareness   [x]Increased pain       Comments:    Pt supine in bed upon entering, agreeable to participate. Pt instructed to transfer to EOB, completing transfer with no physical assistance. Pt sitting upright with good static sitting balance. Pt cued for hand placement and instructed to stand from EOB. Pt demonstrating good static standing balance with ww, instructed to ambulate to tolerance. Pt demonstrating good sera with ww use. Pt cued for pacing and energy conservation as needed. Pt transferred to bedside chair after bout, cued for BLE postioning and hand placement. Pt remained in bedside chair at the end of session, all needs met and call bell in reach prior to exiting. Treatment:  Patient practiced and was instructed in the following treatment:     Bed mobility training - pt given verbal and tactile cues to facilitate proper sequencing and safety during rolling and supine>sit as well as provided with stand by assistance to complete task    STS and pivot transfer training - pt educated on proper hand and foot placement, safety and sequencing, and use of verbal and tactile cues to safely complete sit<>stand and pivot transfers with stand by assistance to complete task safely    Gait training- pt was given verbal and tactile cues to facilitate pt safety with ww during ambulation as well as provided with stand by assistance. Pt's/ family goals   1. Return home    Prognosis is good for reaching above PT goals. Patient and or family understand(s) diagnosis, prognosis, and plan of care.   yes    PHYSICAL THERAPY PLAN OF CARE:    PT POC is established based on physician order and patient diagnosis     Referring provider/PT Order:  Fernand Councilman, MD  Diagnosis:  Aortic dissection ([facility] [I71.00]  Aortic aneurysm, descending ([facility] [I71.9]  Specific instructions for next treatment:  Progress as tolerated    Current Treatment Recommendations:     [x] Strengthening to improve independence with functional mobility   [] ROM to improve independence with functional mobility   [x] Balance Training to improve static/dynamic balance and to reduce fall risk  [x] Endurance Training to improve activity tolerance during functional mobility   [x] Transfer Training to improve safety and independence with all functional transfers   [x] Gait Training to improve gait mechanics, endurance and assess need for appropriate assistive device  [x] Stair Training in preparation for safe discharge home and/or into the community   [] Positioning to prevent skin breakdown and contractures  [x] Safety and Education Training   [x] Patient/Caregiver Education   [] HEP  [] Other     PT long term treatment goals are located in above grid    Frequency of treatments: 2-5x/week x 1-2 weeks. Time in  1430  Time out  1505    Total Treatment Time  23 minutes     Evaluation Time includes thorough review of current medical information, gathering information on past medical history/social history and prior level of function, completion of standardized testing/informal observation of tasks, assessment of data and education on plan of care and goals.     CPT codes:  [x] Low Complexity PT evaluation 43274  [] Moderate Complexity PT evaluation 25664  [] High Complexity PT evaluation 66412  [] PT Re-evaluation 76103  [] Gait training 88631 -- minutes  [] Manual therapy [phone] -- minutes  [x] Therapeutic activities 62930 23 minutes  [] Therapeutic exercises 49634 -- minutes  [] Neuromuscular reeducation 41410 -- minutes     Marcela Paget, PT, DPT  [id number]

## 2022-05-02 NOTE — PATIENT CARE CONFERENCE
[facility] Quality Flow/Interdisciplinary Rounds Progress Note        Quality Flow Rounds held on May 2, 2022    Disciplines Attending:  Bedside Nurse, ,  and Nursing Unit Leadership    Elly Richardson was admitted on 5/1/2022  3:38 AM    Anticipated Discharge Date:       Disposition:    Rex Score:  Rex Scale Score: 17    Readmission Risk              Risk of Unplanned Readmission:  12           Discussed patient goal for the day, patient clinical progression, and barriers to discharge.   The following Goal(s) of the Day/Commitment(s) have been identified:  Diagnostics - Report Results and Labs - Report Results      Miko Kam RN  May 2, 2022

## 2022-05-02 NOTE — PROGRESS NOTES
Vascular Surgery Progress Note    Pt is being seen in f/u today regarding Type B aortic dissection,  pseudoaneurysm    Subjective  Pt s/e.   Pain much better today  Denies CP, SOB  Willing to proceed with surgery if deemed necessary  No acute issues overnight    Current Medications:    sodium chloride        sodium chloride flush, sodium chloride, ondansetron **OR** ondansetron, polyethylene glycol, acetaminophen **OR** acetaminophen, morphine    amLODIPine  10 mg Oral Daily    docusate sodium  100 mg Oral BID    ferrous sulfate  325 mg Oral Daily with breakfast    furosemide  40 mg Oral Daily    lisinopril  20 mg Oral Daily    metoprolol  100 mg Oral TID    mirtazapine  7.5 mg Oral Nightly    PARoxetine  20 mg Oral Daily    rosuvastatin  10 mg Oral Nightly    cholestyramine light  4 g Oral Daily    sodium chloride flush  5-40 mL IntraVENous 2 times per day      PHYSICAL EXAM:    BP 98/73   Pulse 80   Temp 97.5 °F (36.4 °C) (Temporal)   Resp 16   Ht 5' 4\" (1.626 m)   Wt 153 lb (69.4 kg)   SpO2 100%   BMI 26.26 kg/m²     Intake/Output Summary (Last 24 hours) at 5/2/2022 1435  Last data filed at 5/2/2022 1306  Gross per 24 hour   Intake 600 ml   Output 950 ml   Net -350 ml        Gen Awake, alert, oriented x3, in no apparent distress   CVS S1, S2  Resp No increased work of breathing   Abd Soft, non-tender, non-distended   R LE Warm, no edema, palpable pulses  L LE Warm , no edema, palpable pulses    LABS:    Lab Results   Component Value Date    WBC 10.9 05/01/2022    HGB 12.4 (L) 05/01/2022    HCT 38.9 05/01/2022     05/01/2022    PROTIME 13.7 (H) 05/01/2022    INR 1.2 05/01/2022    APTT 27.5 05/01/2022    K 4.0 05/01/2022    BUN 19 05/01/2022    CREATININE 1.2 05/01/2022     A/P Type B aortic dissection,  pseudoaneurysm    · CTA chest: 1.7cm region of focal outpouching suggestive of pseudoaneurysm, possible penetrating ulcer  · Possible need for intervention  · Most likely recent fall contributing to back pain - pain much improved  · Cardiology consult - appreciate their input  · Plan to be discussed with Dr. Gita Montoya, APRN - CNP

## 2022-05-02 NOTE — PROGRESS NOTES
[facility] CARDIOLOGY PROGRESS NOTE  [facility]        Subjective: Seeing patient preoperative cardiovascular assessment prior to possible type B aortic dissection intervention per vascular surgery, right-sided chest symptoms likely from fall and musculoskeletal injury. Developed a type B aortic dissection April 2, 2022. Today he reports he feels much better more comfortable. No reported shortness of breath or distress. Objective: Labs chart medications and telemetry all reviewed.     Patient Vitals for the past 24 hrs:   BP Temp Temp src Pulse Resp SpO2 Height   05/02/22 1302 -- -- -- -- -- -- 5' 4\" (1.626 m)   05/02/22 1100 98/73 97.5 °F (36.4 °C) Temporal 80 16 100 % --   05/02/22 0800 (!) 98/56 97.6 °F (36.4 °C) -- 66 16 96 % --   05/01/22 2351 109/60 98.4 °F (36.9 °C) Temporal 50 18 97 % --   05/01/22 2022 101/61 97.5 °F (36.4 °C) Temporal 53 16 96 % --         Intake/Output Summary (Last 24 hours) at 5/2/2022 1458  Last data filed at 5/2/2022 1306  Gross per 24 hour   Intake 600 ml   Output 650 ml   Net -50 ml       Wt Readings from Last 3 Encounters:   05/01/22 153 lb (69.4 kg)   04/08/22 171 lb 6.4 oz (77.7 kg)   03/25/22 166 lb (75.3 kg)       Telemetry: Personally reviewed and shows normal sinus rhythm heart rate in the 60s    Current meds: Scheduled Meds:   amLODIPine  10 mg Oral Daily    docusate sodium  100 mg Oral BID    ferrous sulfate  325 mg Oral Daily with breakfast    furosemide  40 mg Oral Daily    lisinopril  20 mg Oral Daily    metoprolol  100 mg Oral TID    mirtazapine  7.5 mg Oral Nightly    PARoxetine  20 mg Oral Daily    rosuvastatin  10 mg Oral Nightly    cholestyramine light  4 g Oral Daily    sodium chloride flush  5-40 mL IntraVENous 2 times per day     Continuous Infusions:   sodium chloride       PRN Meds:.sodium chloride flush, sodium chloride, ondansetron **OR** ondansetron, polyethylene glycol, acetaminophen **OR** acetaminophen, morphine    Allergies: Patient has no known allergies. Labs:   Recent Labs     05/01/22 0408   WBC 10.9   HGB 12.4*   HCT 38.9   MCV 92.2          Labs:   Recent Labs     05/01/22 0408      K 4.0      CO2 23   BUN 19   CREATININE 1.2       Labs: No results for input(s): CKTOTAL, CKMB, CKMBINDEX, TROPONINI in the last 72 hours. Labs: No results for input(s): BNP in the last 72 hours. Labs: No results for input(s): CHOL, HDL, TRIG in the last 72 hours. Invalid input(s): Kayleigh Calderon    Labs:   Recent Labs     05/01/22 0408   PROT 7.1   INR 1.2       Review of systems: No reported significant weight gain or weight loss. no dysuria or frequency, no dizziness, falls or trauma, no change in bowel or bladder habits, no hematochezia, hemoptysis or hematuria. No fevers, chills, nausea or vomiting reported. No significant wheezing or sputum production. No headache or visual changes. The remainder of the 10 review of systems otherwise negative. Exam      General: Patient comfortable in no distress and currently denies any chest pain. HEENT: Face symmetrical and no apparent cranial nerve deficit. Neck: No jugular venous distention, carotid bruit or thyromegaly. Lungs: Clear bilaterally without rales, wheezes or dullness. Cardiac: Regular rate and rhythm, no S3, S4, no rub or gallop. Abdomen: No rebound or guarding, no hepatosplenomegaly. Extremities: Without significant clubbing , cyanosis, or edema. Neuro:  No focal motor or sensory deficit apparent. Skin: No petechiae, no significant bruising. Assessment: See plan below        Plan: #1 preoperative cardiovascular assessment prior to possible type B aortic dissection intervention possible stent placement and will defer options and timing to vascular surgery. Echocardiogram done June 2021 showed normal LVEF 50%. Patient denies any known history of myocardial infarction.     Would suggest somewhat of an increased risk for vascular intervention but not unreasonable to proceed as he is an [age]year-old gentleman who is relatively active and functional and did spend 3 months in [place] this recent winter and denies any exertional chest pain congestive heart failure or syncope. #2 sinus bradycardia documented yesterday and EKG and would suspect potential vagal mediated bradycardia and also on appropriate large dose of metoprolol tartrate 100 mg 3 times a day for type B aortic dissection. #3 hypertension blood pressure under good control on excellent antihypertensive medical regimen. Systolic blood pressure 304-0 40 diastolic in 23W and 24S. #4 type B aortic dissection and currently is on beta-blocker, statin amlodipine, lisinopril, diuretic    #5 current right lateral chest discomfort musculoskeletal in etiology by examination and by history is a started after fall and hurts when he rotates his torso.       Electronically signed by Timmy Carmona MD on 5/2/2022 at 2:58 PM

## 2022-05-03 ENCOUNTER — APPOINTMENT (OUTPATIENT)
Dept: ULTRASOUND IMAGING | Age: 87
DRG: 300 | End: 2022-05-03
Payer: MEDICARE

## 2022-05-03 VITALS
SYSTOLIC BLOOD PRESSURE: 103 MMHG | WEIGHT: 153 LBS | DIASTOLIC BLOOD PRESSURE: 70 MMHG | HEART RATE: 70 BPM | BODY MASS INDEX: 26.12 KG/M2 | OXYGEN SATURATION: 97 % | TEMPERATURE: 97.7 F | RESPIRATION RATE: 21 BRPM | HEIGHT: 64 IN

## 2022-05-03 PROBLEM — I82.611 ACUTE BASILIC VEIN THROMBOSIS, RIGHT: Status: ACTIVE | Noted: 2022-05-03

## 2022-05-03 PROCEDURE — 6370000000 HC RX 637 (ALT 250 FOR IP): Performed by: INTERNAL MEDICINE

## 2022-05-03 PROCEDURE — 93971 EXTREMITY STUDY: CPT

## 2022-05-03 PROCEDURE — 6370000000 HC RX 637 (ALT 250 FOR IP): Performed by: FAMILY MEDICINE

## 2022-05-03 PROCEDURE — 2580000003 HC RX 258: Performed by: FAMILY MEDICINE

## 2022-05-03 PROCEDURE — 6370000000 HC RX 637 (ALT 250 FOR IP): Performed by: SURGERY

## 2022-05-03 PROCEDURE — 99233 SBSQ HOSP IP/OBS HIGH 50: CPT | Performed by: SURGERY

## 2022-05-03 PROCEDURE — G0378 HOSPITAL OBSERVATION PER HR: HCPCS

## 2022-05-03 RX ORDER — METOPROLOL TARTRATE 100 MG/1
100 TABLET ORAL 2 TIMES DAILY
Status: ON HOLD | COMMUNITY
End: 2022-05-03 | Stop reason: SDUPTHER

## 2022-05-03 RX ORDER — METOPROLOL TARTRATE 50 MG/1
100 TABLET, FILM COATED ORAL 2 TIMES DAILY
Status: DISCONTINUED | OUTPATIENT
Start: 2022-05-03 | End: 2022-05-03

## 2022-05-03 RX ORDER — ASPIRIN 81 MG/1
81 TABLET, CHEWABLE ORAL DAILY
Status: DISCONTINUED | OUTPATIENT
Start: 2022-05-03 | End: 2022-05-03 | Stop reason: HOSPADM

## 2022-05-03 RX ORDER — METOPROLOL TARTRATE 50 MG/1
100 TABLET, FILM COATED ORAL 3 TIMES DAILY
Status: DISCONTINUED | OUTPATIENT
Start: 2022-05-03 | End: 2022-05-03 | Stop reason: HOSPADM

## 2022-05-03 RX ORDER — ESOMEPRAZOLE MAGNESIUM 40 MG/1
40 FOR SUSPENSION ORAL DAILY
COMMUNITY
End: 2022-10-04 | Stop reason: ALTCHOICE

## 2022-05-03 RX ORDER — ASPIRIN 81 MG/1
81 TABLET, CHEWABLE ORAL DAILY
Qty: 30 TABLET | Refills: 3 | Status: SHIPPED | OUTPATIENT
Start: 2022-05-03

## 2022-05-03 RX ORDER — METOPROLOL TARTRATE 100 MG/1
100 TABLET ORAL 3 TIMES DAILY
Qty: 60 TABLET | Refills: 3
Start: 2022-05-03 | End: 2022-05-23 | Stop reason: SDUPTHER

## 2022-05-03 RX ORDER — PANTOPRAZOLE SODIUM 40 MG/1
40 TABLET, DELAYED RELEASE ORAL
Status: DISCONTINUED | OUTPATIENT
Start: 2022-05-04 | End: 2022-05-03 | Stop reason: HOSPADM

## 2022-05-03 RX ADMIN — CHOLESTYRAMINE 4 G: 4 POWDER, FOR SUSPENSION ORAL at 08:37

## 2022-05-03 RX ADMIN — PAROXETINE 20 MG: 20 TABLET, FILM COATED ORAL at 08:37

## 2022-05-03 RX ADMIN — AMLODIPINE BESYLATE 10 MG: 10 TABLET ORAL at 08:37

## 2022-05-03 RX ADMIN — LISINOPRIL 20 MG: 20 TABLET ORAL at 08:37

## 2022-05-03 RX ADMIN — METOPROLOL TARTRATE 100 MG: 50 TABLET, FILM COATED ORAL at 18:07

## 2022-05-03 RX ADMIN — FERROUS SULFATE TAB 325 MG (65 MG ELEMENTAL FE) 325 MG: 325 (65 FE) TAB at 08:36

## 2022-05-03 RX ADMIN — FUROSEMIDE 40 MG: 40 TABLET ORAL at 08:37

## 2022-05-03 RX ADMIN — Medication 10 ML: at 08:36

## 2022-05-03 RX ADMIN — ASPIRIN 81 MG 81 MG: 81 TABLET ORAL at 18:07

## 2022-05-03 RX ADMIN — METOPROLOL TARTRATE 100 MG: 50 TABLET, FILM COATED ORAL at 08:37

## 2022-05-03 NOTE — PROGRESS NOTES
[facility] CARDIOLOGY PROGRESS NOTE  [facility]        Subjective: Right-sided chest symptoms and right lateral chest discomfort after fall with known type B aortic dissection April 2, 2022 and follow-up imaging shows possible pseudoaneurysm but no progressive dissection    Has known history of hypertension and hyperlipidemia. Today he reports he feels much better with much less  Right-sided chest discomfort. Objective: Labs chart medications and telemetry are reviewed. Patient Vitals for the past 24 hrs:   BP Temp Temp src Pulse Resp SpO2 Height   05/03/22 0740 115/62 97.9 °F (36.6 °C) Oral 55 16 98 % --   05/02/22 2310 117/62 97.8 °F (36.6 °C) Temporal 56 16 93 % --   05/02/22 2034 -- -- -- 60 -- -- --   05/02/22 2015 -- -- -- 60 -- -- --   05/02/22 1957 (!) 105/58 97.8 °F (36.6 °C) Oral 57 16 95 % --   05/02/22 1523 (!) 102/59 98.1 °F (36.7 °C) Temporal 76 18 96 % --   05/02/22 1302 -- -- -- -- -- -- 5' 4\" (1.626 m)   05/02/22 1100 98/73 97.5 °F (36.4 °C) Temporal 80 16 100 % --         Intake/Output Summary (Last 24 hours) at 5/3/2022 1054  Last data filed at 5/3/2022 0953  Gross per 24 hour   Intake 600 ml   Output 1300 ml   Net -700 ml       Wt Readings from Last 3 Encounters:   05/01/22 153 lb (69.4 kg)   04/08/22 171 lb 6.4 oz (77.7 kg)   03/25/22 166 lb (75.3 kg)       Telemetry: Personally reviewed and shows normal sinus rhythm heart rate in the 50s.     Current meds: Scheduled Meds:   metoprolol  100 mg Oral BID    [START ON 5/4/2022] pantoprazole  40 mg Oral QAM AC    amLODIPine  10 mg Oral Daily    docusate sodium  100 mg Oral BID    ferrous sulfate  325 mg Oral Daily with breakfast    furosemide  40 mg Oral Daily    lisinopril  20 mg Oral Daily    mirtazapine  7.5 mg Oral Nightly    PARoxetine  20 mg Oral Daily    rosuvastatin  10 mg Oral Nightly    cholestyramine light  4 g Oral Daily    sodium chloride flush  5-40 mL IntraVENous 2 times per day     Continuous Infusions:   sodium chloride       PRN Meds:.sodium chloride flush, sodium chloride, ondansetron **OR** ondansetron, polyethylene glycol, acetaminophen **OR** acetaminophen, morphine    Allergies: Patient has no known allergies. Labs:   Recent Labs     05/01/22 0408   WBC 10.9   HGB 12.4*   HCT 38.9   MCV 92.2          Labs:   Recent Labs     05/01/22 0408      K 4.0      CO2 23   BUN 19   CREATININE 1.2       Labs: No results for input(s): CKTOTAL, CKMB, CKMBINDEX, TROPONINI in the last 72 hours. Labs: No results for input(s): BNP in the last 72 hours. Labs: No results for input(s): CHOL, HDL, TRIG in the last 72 hours. Invalid input(s): Chanelle Dessert    Labs:   Recent Labs     05/01/22 0408   PROT 7.1   INR 1.2       Review of systems: No reported significant weight gain or weight loss. no dysuria or frequency, no dizziness, falls or trauma, no change in bowel or bladder habits, no hematochezia, hemoptysis or hematuria. No fevers, chills, nausea or vomiting reported. No significant wheezing or sputum production. No headache or visual changes. The remainder of the 10 review of systems otherwise negative. Exam      General: Patient comfortable in no distress and currently denies any chest pain. HEENT: Face symmetrical and no apparent cranial nerve deficit. Neck: No jugular venous distention, carotid bruit or thyromegaly. Lungs: Clear bilaterally without rales, wheezes or dullness. Cardiac: Regular rate and rhythm, no S3, S4, no rub or gallop. Abdomen: No rebound or guarding, no hepatosplenomegaly. Extremities: Without significant clubbing , cyanosis, or edema. Neuro:  No focal motor or sensory deficit apparent. Skin: No petechiae, no significant bruising. Assessment: See plan below      Plan: #1 right-sided chest symptoms musculoskeletal in etiology after fall.     #2 type B aortic dissection stable and defer follow-up to vascular surgery. #3 hypertension blood pressure reasonable on current medical regimen. #4 sinus bradycardia without pauses or AV block. Narrow QRS on telemetry and EKGs and MN interval reasonable. Continue metoprolol 100 mg 3 times a day which is probably the best medication for dissection management    #5 hyperlipidemia and continues on statin. Crestor 10 mg daily. Plan discharged home today from cardiology viewpoint and to follow-up my office [facility] cardiology in 6 weeks.         Electronically signed by Mark Garza MD on 5/3/2022 at 10:54 AM

## 2022-05-03 NOTE — PROGRESS NOTES
VASCULAR SURGERY PROGRESS NOTE    Pt is being seen in f/u today regarding aortic dissection    SUBJECTIVE  Pt s/e. No complaints. BP & HR well controlled    CURRENT MEDICATIONS    sodium chloride        sodium chloride flush, sodium chloride, ondansetron **OR** ondansetron, polyethylene glycol, acetaminophen **OR** acetaminophen, morphine    amLODIPine  10 mg Oral Daily    docusate sodium  100 mg Oral BID    ferrous sulfate  325 mg Oral Daily with breakfast    furosemide  40 mg Oral Daily    lisinopril  20 mg Oral Daily    metoprolol  100 mg Oral TID    mirtazapine  7.5 mg Oral Nightly    PARoxetine  20 mg Oral Daily    rosuvastatin  10 mg Oral Nightly    cholestyramine light  4 g Oral Daily    sodium chloride flush  5-40 mL IntraVENous 2 times per day        PHYSICAL EXAM   /62   Pulse 56   Temp 97.8 °F (36.6 °C) (Temporal)   Resp 16   Ht 5' 4\" (1.626 m)   Wt 153 lb (69.4 kg)   SpO2 93%   BMI 26.26 kg/m²     Intake/Output Summary (Last 24 hours) at 5/3/2022 0707  Last data filed at 5/3/2022 0630  Gross per 24 hour   Intake 540 ml   Output 1050 ml   Net -510 ml          GENERAL:  NAD. A&Ox3. LUNGS:  No increased work of breathing. CARDIOVASCULAR: RR  ABDOMEN:  Soft, non-distended, non-tender. No guarding, rigidity, rebound. EXTREMITIES:   MAEx4. Atraumatic. Palpable b/l distal pulses. LABS    Lab Results   Component Value Date    WBC 10.9 05/01/2022    HGB 12.4 (L) 05/01/2022    HCT 38.9 05/01/2022     05/01/2022    PROTIME 13.7 (H) 05/01/2022    INR 1.2 05/01/2022    APTT 27.5 05/01/2022    K 4.0 05/01/2022    BUN 19 05/01/2022    CREATININE 1.2 05/01/2022       ASSESSMENT/PLAN  80 y.o. male with type B aortic dissection now with thrombosed lumen, concern for possible penetrating aortic ulcer vs resolving dissection.   Improved back pain likely related to recent fall    No current complaints of pain  Heart rate and BP well controlled  Seen by cardiology -- increased risk for aortic intervention based on age & debilitated status  No plans for immediate vascular surgery intervention  Follow up in the office for repeat scan in couple months  OK for discharge from vascular standpoint    Gayathri Douglas DO  Resident, PGY-4  5/3/2022  7:07 AM    Pt seen and examined    R UE soreness - palpable cord in forearm    US notes basilic vein thrombus  No evidence of DVT    Start asa 81 mg daily    Pt will call with more pain, swelling, redness    Will reimage in 2 weeks    Descending thoracic dissection  - in further review do not believe true aleks  - will reimage in 2 months  - no surgical intervention at this time    Pain of back likely related to trauma and improving    Discussed with patient and nephew re above  Ok for [employer], MD

## 2022-05-03 NOTE — PROGRESS NOTES
Vascular Surgery Progress Note    Pt is being seen in f/u today regarding Type B aortic dissection,  pseudoaneurysm    Subjective  Pt s/e.   Denies pain  Denies CP, SOB  For possible discharge today  Needs US of R UE from where IV was removed  No acute issues overnight    Current Medications:    sodium chloride        sodium chloride flush, sodium chloride, ondansetron **OR** ondansetron, polyethylene glycol, acetaminophen **OR** acetaminophen, morphine    metoprolol  100 mg Oral BID    [START ON 5/4/2022] pantoprazole  40 mg Oral QAM AC    amLODIPine  10 mg Oral Daily    docusate sodium  100 mg Oral BID    ferrous sulfate  325 mg Oral Daily with breakfast    furosemide  40 mg Oral Daily    lisinopril  20 mg Oral Daily    mirtazapine  7.5 mg Oral Nightly    PARoxetine  20 mg Oral Daily    rosuvastatin  10 mg Oral Nightly    cholestyramine light  4 g Oral Daily    sodium chloride flush  5-40 mL IntraVENous 2 times per day      PHYSICAL EXAM:    /62   Pulse 55   Temp 97.9 °F (36.6 °C) (Oral)   Resp 16   Ht 5' 4\" (1.626 m)   Wt 153 lb (69.4 kg)   SpO2 98%   BMI 26.26 kg/m²     Intake/Output Summary (Last 24 hours) at 5/3/2022 1150  Last data filed at 5/3/2022 0953  Gross per 24 hour   Intake 600 ml   Output 1300 ml   Net -700 ml        Gen Awake, alert, oriented x3, in no apparent distress   CVS S1, S2  Resp No increased work of breathing   Abd Soft, non-tender, non-distended   R UE Erythema around old IV site, phlebitis  R LE Warm, no edema, palpable pulses  L LE Warm , no edema, palpable pulses    LABS:    Lab Results   Component Value Date    WBC 10.9 05/01/2022    HGB 12.4 (L) 05/01/2022    HCT 38.9 05/01/2022     05/01/2022    PROTIME 13.7 (H) 05/01/2022    INR 1.2 05/01/2022    APTT 27.5 05/01/2022    K 4.0 05/01/2022    BUN 19 05/01/2022    CREATININE 1.2 05/01/2022     A/P Type B aortic dissection,  pseudoaneurysm  · CTA chest: 1.7cm region of focal outpouching suggestive of pseudoaneurysm, possible penetrating ulcer  · No need for intervention at this time  · Follow up in the office  · Most likely recent fall contributing to back pain - pain improved  · Cardiology consult - appreciate their input  · US R UE to evaluate for DVT post IV removal  · No vascular surgery interventions planned at this time.      Ashley Monae, APRN - CNP

## 2022-05-03 NOTE — CARE COORDINATION
Anticipate discharging today, hhc orders are written. Called [facility] Home with [facility], they have accepted pt and will pull up hhc orders, they are notified that pt is discharging today. Awaiting U/S.  SENAIT Mcneill, BEKAW

## 2022-05-03 NOTE — PROGRESS NOTES
Message sent to [facility] doctor to inform him that bedside RN removed IV from Right forearm and site is hard, red and painful.

## 2022-05-03 NOTE — DISCHARGE SUMMARY
Hospitalist Discharge Summary    Patient ID: Jennifer Gabriel   Patient : 1935  Patient's PCP: Angel Askew DO    Admit Date: 2022   Admitting Physician: Xu Joshua MD    Discharge Date:  5/3/2022  Discharge Physician: Tatum James DO   Discharge Condition: Stable  Discharge Disposition: Home    History of presenting illness:  History Of Present Illness:    Jennifer Gabriel is a 80 y.o. male presenting to the ED for chest pain and back pain, beginning 1 hours ago. 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 The patient states that he went to a rehab facility after being treated medically. The patient states that he got out of the rehab facility on Friday. He states that tonight while at home he had sudden onset chest pain and back pain that he describes as sharp stabbing pain. History Of Present Illness:    Jennifer Gabriel is a 80 y.o. male presenting to the ED for chest pain and back pain, beginning 1 hours ago. 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 The patient states that he went to a rehab facility after being treated medically. The patient states that he got out of the rehab facility on Friday. He states that tonight while at home he had sudden onset chest pain and back pain that he describes as sharp stabbing pain. CTA chest and abd showed:  Again noted are findings consistent with an aneurysm involving the descending  thoracic aorta, which is partially thrombosed. When compared to the previous  study, the previously seen features of an acute dissecting aneurysm are not  visualized. However, there is a 1.7 cm region of focal outpouching of  contrast, suggestive of a pseudoaneurysm, with small penetrating ulcer seen. No definitive evidence of acute aortic intramural hematoma is seen on the  noncontrast images.        Hospital course in brief:  (Please refer to daily progress notes for a comprehensive review of the hospitalization by requesting medical records)  Patient remained in the hospital for several days  During that time he was evaluated in consult by vascular surgery and cardiology service  Please see history and physical/consult notes/and daily progress notes for details  I began to evaluate the patient on May 2  By the time I evaluated the patient vascular surgery had declared disposition for this patient with no need for surgical intervention  Cardiology service did evaluate the patient on May 1  [place] that the patient's symptoms of right-sided chest discomfort were not anginal and musculoskeletal in nature     Admission ECG May 1, 2022 at 3:42 AM personally reviewed reviewed by me revealed normal sinus rhythm 93 MD interval 226 ms  ms. EKG done this afternoon when I was in the room at approximately 1430 shows sinus bradycardia heart rate of 47 no ischemic change. They also offer the opinion preoperative cardiovascular assessment    #5 preoperative cardiovascular assessment and if he needs any aggressive surgical intervention of aorta certainly would be increased risk just based on being 80years old and currently somewhat debilitated.     Vascular surgery opinion is that this patient did not require acute intervention  I evaluated the patient again on May 3  Vital signs appeared stable except for bradycardia  Cardiologist recommended to continue current medical regimen for blood pressure as it was under reasonable control  And continue metoprolol 100 mg 3 times a day  As well as Crestor 10 mg a day  From cardiology point of view patient was cleared for discharge with follow-up [facility] cardiology in 6 weeks  Vascular surgery also recommended follow-up in the office and no need for surgical intervention at this time    Right upper extremity ultrasound was ordered  As nurse reported that there was some erythema and swelling at the site of IV insertion and with removal of IV line  Right upper extremity ultrasound was ordered to evaluate for the presence of either of phlebitis or DVT    I called patient sun may 8 621pm spoke with patient by phone call  To report results of us   recommended asa 81 mg po qd   he will f-up with vasc surgery in near future  Patient understood these recommendations and stated he had aspirin at home and did not need me to call in the aspirin    Consults:   IP CONSULT TO VASCULAR SURGERY  IP CONSULT TO INTERNAL MEDICINE  IP CONSULT TO CARDIOLOGY    Discharge Diagnoses:  Type B aortic dissection,  pseudoaneurysm  CTA chest: 1.7cm region of focal outpouching suggestive of pseudoaneurysm, possible penetrating ulcer  No need for intervention at this time  Hypertension-essential/controlled  Bradycardia this youwithout pauses or AV block. Narrow QRS on telemetry and EKGs and ID interval reasonable.   hyperlipidemia   right-sided chest symptoms musculoskeletal in etiology after fall.      Discharge Instructions / Follow up:  Future Appointments   Date Time Provider Fatou Tracy   5/10/2022  2:45 PM Benito Meier DO [facility]   5/16/2022  3:30 PM [facility] RM 1 SEYZ  [facility]   7/18/2022  1:00 PM Rhonda Tolbert MD [facility]   8/8/2022 11:15 AM Jesus Pineda DO [facility]        Follow-up with vascular surgery  Follow-up with [facility] cardiology in 6 weeks  Continued appropriate risk factor modification of blood pressure, diabetes and serum lipids will remain essential to reducing risk of future atherosclerotic development    Activity: activity as tolerated    Significant labs:no bc in system for epic emr for the last 30-60 days  CBC:   Recent Labs     05/01/22  0408   WBC 10.9   RBC 4.22   HGB 12.4*   HCT 38.9   MCV 92.2   RDW 13.9        BMP:   Recent Labs     05/01/22  0408      K 4.0      CO2 23   BUN 19   CREATININE 1.2     LFT:  Recent Labs     05/01/22  0408   PROT 7.1   ALKPHOS 91   ALT 16   AST 16   BILITOT 0.6 LIPASE 45     PT/INR:   Recent Labs     05/01/22  0408   INR 1.2   APTT 27.5       Urinalysis:    Lab Results   Component Value Date    NITRU Negative 05/16/2019    BLOODU Negative 05/16/2019    SPECGRAV <=1.005 05/16/2019    GLUCOSEU Negative 05/16/2019       Imaging:    XR CHEST PORTABLE    Result Date: 5/1/2022  EXAMINATION: ONE XRAY VIEW OF THE CHEST 5/1/2022 4:27 am COMPARISON: April 6, 2022. HISTORY: ity. No acute findings. CTA CHEST W CONTRAST    Result Date: 5/1/2022  EXAMINATION: CTA OF THE CHEST 5/1/2022 3:52 am TECHNIQUE:   Again noted are findings consistent with an aneurysm involving the descending thoracic aorta, which is partially thrombosed. When compared to the previous study, the previously seen features of an acute dissecting aneurysm are not visualized. However, there is a 1.7 cm region of focal outpouching of contrast, suggestive of a pseudoaneurysm, with small penetrating ulcer seen. No definitive evidence of acute aortic intramural hematoma is seen on the noncontrast images. Measurements are as above. No acute intrathoracic findings otherwise. Chronic interstitial/fibrotic changes involving the lungs. Please refer to the CT of the abdomen and pelvis for findings regarding the abdominal aorta. CTA ABDOMEN PELVIS W CONTRAST    Result Date: 5/1/2022  EXAMINATION: CTA OF THE ABDOMEN AND PELVIS WITH CONTRAST 5/1/2022 3:52 am:   Essentially stable CT angiogram of the abdomen and pelvis, as detailed above, again demonstrating mild aneurysmal dilatation at the level of the aortic hiatus, without dissection.   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 Additional findings as above, not significantly changed since the previous exam.       christel us 5/03/2022  There is evidence for deep venous thrombosis       ALERT:  THIS IS AN ABNORMAL REPORT             Discharge Medications: Medication List        CONTINUE taking these medications      ACIDOPHILUS LACTOBACILLUS PO     amLODIPine 10 MG tablet  Commonly known as: NORVASC  Take 1 tablet by mouth daily     colesevelam 625 MG tablet  Commonly known as: WELCHOL  Take 1 tablet by mouth 2 times daily (with meals)     docusate sodium 100 MG capsule  Commonly known as: COLACE  Take 1 capsule by mouth 2 times daily     ferrous sulfate 325 (65 Fe) MG tablet  Commonly known as: IRON 325  Take 1 tablet by mouth daily (with breakfast)     furosemide 40 MG tablet  Commonly known as: LASIX  Take 1 tablet by mouth daily     lisinopril 20 MG tablet  Commonly known as: PRINIVIL;ZESTRIL  Take 1 tablet by mouth daily     metoprolol 100 MG tablet  Commonly known as: LOPRESSOR  Take 1 tablet by mouth in the morning, at noon, and at bedtime     mirtazapine 7.5 MG tablet  Commonly known as: REMERON  Take 1 tablet by mouth nightly     PARoxetine 20 MG tablet  Commonly known as: PAXIL  TAKE 1 TABLET BY MOUTH ONCE DAILY     PEPTO-BISMOL PO     polyethylene glycol 17 g packet  Commonly known as: GLYCOLAX  Take 17 g by mouth daily as needed for Constipation     rosuvastatin 10 MG tablet  Commonly known as: CRESTOR  TAKE 1 TABLET BY MOUTH AT  NIGHT     VITEYES AREDS FORMULA/LUTEIN PO              Time Spent on discharge is more than 35 minutes in the examination, evaluation, counseling and review of medications and discharge plan.    +++++++++++++++++++++++++++++++++++++++++++++++++  DO NADER Polanco/ [address], [facility]  +++++++++++++++++++++++++++++++++++++++++++++++++  NOTE: This report was transcribed using voice recognition software. Every effort was made to ensure accuracy; however, inadvertent computerized transcription errors may be present.

## 2022-05-03 NOTE — PROGRESS NOTES
Hospitalist Progress Note      SYNOPSIS: Patient admitted on 2022 for Aortic dissection ([address]  From  80 y. o. male presenting to the ED for chest pain and back pain, beginning 1 hours ago.  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  The patient states that he went to a rehab facility after being treated medically. Erika Porter patient states that he got out of the rehab facility on Friday.  He states that tonight while at home he had sudden onset chest pain and back pain that he describes as sharp stabbing pain.  He states is very similar to the pain he had the previous time he was admitted. Type B aortic dissection,  pseudoaneurysm     SUBJECTIVE:    Per vascular surgery  ·  CTA chest: 1.7cm region of focal outpouching suggestive of pseudoaneurysm, possible penetrating ulcer  ? No need for intervention at this time  ? Follow up in the office    Patient denies new symptoms of chest pain or difficulty breathing  Initially stated that he felt some discomfort with his right knee  However stated that he was ambulating and was weightbearing without difficulty  Patient feels well and would like to go home today  Patient's nurse informed me that when IV line was discontinued she noticed some swelling in the right upper extremity  A right upper extremity ultrasound has been ordered    Temp (24hrs), Av.9 °F (36.6 °C), Min:97.7 °F (36.5 °C), Max:98.1 °F (36.7 °C)        OBJECTIVE: On room air 98%    BP 90/61   Pulse 61   Temp 97.7 °F (36.5 °C) (Core)   Resp 21   Ht 5' 4\" (1.626 m)   Wt 153 lb (69.4 kg)   SpO2 98%   BMI 26.26 kg/m²     General appearance: Elderly male  Not diaphoretic appears comfortable  HEENT: Lips are average size no obvious swelling to lips or tongue. Shanta Money    Respiratory: Clear to auscultation bilaterally, unlabored respiratory effort at rest  Cardiovascular: Pulse reg audible S1-S2  Skin:  No petechiae or hives on inspection no papules on palpation  Musculoskeletal= no obvious deformity or misalignment to the joints of the lower extremity  The knee joint right lower extremity slightly more swollen compared to the left  Flexion of the right knee is limited as compared to the left leg  Patient is able to bend the knee and flex it to about 105 degrees  But not much more  Flexion with the left knee appears intact  About 125-130 degress without much difficulty  Neurologic: awake, alert and speech is clear      ASSESSMENT:  · CTA chest: 1.7cm region of focal outpouching suggestive of pseudoaneurysm, possible penetrating ulcer  ? No need for intervention at this time  ?  Follow up in the office    type B aortic dissection / pseudoaneurysm  It is felt that patient likely had back pain is secondary to recent fall  Possible right upper extremity phlebitis with IV site  hypertension blood pressure in adequate range  sinus bradycardia in the setting of metoprolol 100 mg 3 times a day at the recommendation of cardiology  Chest wall discomfort without typical anginal features    hyperlipidemia on Crestor 10 mg a day   moderate malnutriti as described by nutrionist  PLAN: Right upper extremity venous ultrasound  If it reveals DVT     Would recommend      Symptomatic care       notification to vascular team of results        I am not certain this patient would be an ideal best candidate     For systemic anticoagulation at this time  If it reveals phlebitis would recommend continued local care  Continue beta-blocker metoprolol 100 mg 3 times a day  Continue statin agent Crestor 10 mg a day  Continue lisinopril  Continue amlodipine  Continue diuretic  Follow-up with vascular surgery  Follow-up with [facility] cardiology in 6 weeks    DISPOSITION: Patient requesting discharge to home at the conclusion of hospital stay  Home health care  Medications:  REVIEWED DAILY    Infusion Medications    sodium chloride       Scheduled Medications    [START ON 5/4/2022] pantoprazole  40 mg Oral QAM AC    metoprolol  100 mg Oral TID    amLODIPine  10 mg Oral Daily    docusate sodium  100 mg Oral BID    ferrous sulfate  325 mg Oral Daily with breakfast    furosemide  40 mg Oral Daily    lisinopril  20 mg Oral Daily    mirtazapine  7.5 mg Oral Nightly    PARoxetine  20 mg Oral Daily    rosuvastatin  10 mg Oral Nightly    cholestyramine light  4 g Oral Daily    sodium chloride flush  5-40 mL IntraVENous 2 times per day     PRN Meds: sodium chloride flush, sodium chloride, ondansetron **OR** ondansetron, polyethylene glycol, acetaminophen **OR** acetaminophen, morphine    Labs: No outstanding blood cultures in the last 30-60 days  In epic EMR  Recent Labs     05/01/22 0408   WBC 10.9   HGB 12.4*   HCT 38.9          Recent Labs     05/01/22 0408      K 4.0      CO2 23   BUN 19   CREATININE 1.2   CALCIUM 9.0       Recent Labs     05/01/22 0408   PROT 7.1   ALKPHOS 91   ALT 16   AST 16   BILITOT 0.6   LIPASE 45       Recent Labs     05/01/22 0408   INR 1.2         Radiology: pending deep christel us rue    +++++++++++++++++++++++++++++++++++++++++++++++++  Jeovany Kelley DO  [address]  +++++++++++++++++++++++++++++++++++++++++++++++++  NOTE: This report was transcribed using voice recognition software. Every effort was made to ensure accuracy; however, inadvertent computerized transcription errors may be present.

## 2022-05-03 NOTE — PROGRESS NOTES
During assessment of patient observed IV site from EMS placed on 5/1. Pt complained of pain, site is bruised, and red which was outlined in black marker. there is a palpable cord. IV pulled and dressing applied, charge is aware and notified physician, awaiting orders.

## 2022-05-03 NOTE — PATIENT CARE CONFERENCE
[facility] Quality Flow/Interdisciplinary Rounds Progress Note        Quality Flow Rounds held on May 3, 2022    Disciplines Attending:  Bedside Nurse, ,  and Nursing Unit Leadership    Adrian Hernandez was admitted on 5/1/2022  3:38 AM    Anticipated Discharge Date:  Expected Discharge Date: 05/04/22    Disposition:    Rex Score:  Rex Scale Score: 19    Readmission Risk              Risk of Unplanned Readmission:  12           Discussed patient goal for the day, patient clinical progression, and barriers to discharge.   The following Goal(s) of the Day/Commitment(s) have been identified:  Diagnostics - Report Results and Labs - Report Results      Sukhjinder Bond RN  May 3, 2022

## 2022-05-04 PROCEDURE — 93971 EXTREMITY STUDY: CPT | Performed by: RADIOLOGY

## 2022-05-05 DIAGNOSIS — I82.611 ACUTE BASILIC VEIN THROMBOSIS, RIGHT: Primary | ICD-10-CM

## 2022-05-05 NOTE — PROGRESS NOTES
Physician Progress Note      PATIENT:               Vandana Soares  CSN #:                  [id number]  :                       1935  ADMIT DATE:       2022 3:38 AM  DISCH DATE:        5/3/2022 7:02 PM  RESPONDING  PROVIDER #:        Johanna Johnson DO          QUERY TEXT:    Pt has moderate malnutrition documented by dietician on  Please further   specify type of malnutrition with documentation in the medical record. The medical record reflects the following:  Risk Factors: poor oral intake, GERD, CAD, age  Clinical Indicators:  dietician--Moderate malnutrition,In context of   chronic illness related to cardiac dysfunction (hx of CAD) as evidenced by   poor intake prior to admission,mild muscle loss,mild loss of subcutaneous fat  Findings of the 6 clinical characteristics of malnutrition:  Energy Intake:  75% or less estimated energy requirements for 1 month or   longer  Weight Loss:  Unable to assess (d/t lack of actual weights available upon   admission)  Body Fat Loss:  Mild body fat loss Orbital,Triceps  Muscle Mass Loss:  Mild muscle mass loss Temples (temporalis),Clavicles   (pectoralis & deltoids)  Fluid Accumulation:  No significant fluid accumulation   Strength:  Not Performed  Treatment: start Ensure high protein supplement daily, boost pudding,   dietician consult    ASPEN Criteria:    https://aspenjournals. onlinelibrary. jackson. com/doi/full/10.1177/544151144795018  5    Thank you, Ean Garzon RN BSN CDS  [phone]  Options provided:  -- Moderate Malnutrition  -- Moderate Protein calorie malnutrition  -- Other - I will add my own diagnosis  -- Disagree - Not applicable / Not valid  -- Disagree - Clinically unable to determine / Unknown  -- Refer to Clinical Documentation Reviewer    PROVIDER RESPONSE TEXT:    This patient has moderate malnutrition.     Query created by: Jairo Walls on 5/3/2022 3:24 PM      Electronically signed by:  Johanna Johnson DO 2022 1:11 PM

## 2022-05-06 ENCOUNTER — TELEPHONE (OUTPATIENT)
Dept: VASCULAR SURGERY | Age: 87
End: 2022-05-06

## 2022-05-06 NOTE — TELEPHONE ENCOUNTER
Scheduled (R) UE venous u/s at [facility] ([facility]) 5/16/22 at 3:30 pm, pt notified. Also, scheduled ov with Dr. Amy Ramon 7/18/22 with a CTA chest to be done prior, appt card mailed.

## 2022-05-10 PROBLEM — I71.012 DESCENDING THORACIC AORTIC DISSECTION (HCC): Status: RESOLVED | Noted: 2022-04-02 | Resolved: 2022-05-10

## 2022-05-10 PROBLEM — I71.00 AORTIC DISSECTION (HCC): Status: RESOLVED | Noted: 2022-05-01 | Resolved: 2022-05-10

## 2022-05-16 ENCOUNTER — HOSPITAL ENCOUNTER (OUTPATIENT)
Dept: ULTRASOUND IMAGING | Age: 87
Discharge: HOME | End: 2022-05-18
Payer: MEDICARE

## 2022-05-16 DIAGNOSIS — I82.611 ACUTE BASILIC VEIN THROMBOSIS, RIGHT: ICD-10-CM

## 2022-05-16 PROCEDURE — 93971 EXTREMITY STUDY: CPT | Performed by: RADIOLOGY

## 2022-05-16 PROCEDURE — 93971 EXTREMITY STUDY: CPT

## 2022-05-24 ENCOUNTER — TELEPHONE (OUTPATIENT)
Dept: VASCULAR SURGERY | Age: 87
End: 2022-05-24

## 2022-05-24 NOTE — TELEPHONE ENCOUNTER
Patient called for ultrasound results.     Please see my note thanks  Spoke with him     Mesfin Hernandez MD

## 2022-05-25 NOTE — PROGRESS NOTES
R UE Venous US reviewed    nterval resolution of basilic vein thrombus    Continue asa 81 mg daily    Discussed with patient     Scheduled to see me in 7/2022  He will need cta chest in regards to dissection prior to that    All ? answered    Todd Martinez MD

## 2022-06-22 ENCOUNTER — OFFICE VISIT (OUTPATIENT)
Dept: SURGERY | Age: 87
End: 2022-06-22
Payer: MEDICARE

## 2022-06-22 VITALS
BODY MASS INDEX: 22.94 KG/M2 | OXYGEN SATURATION: 95 % | HEIGHT: 70 IN | WEIGHT: 160.2 LBS | TEMPERATURE: 97.3 F | SYSTOLIC BLOOD PRESSURE: 126 MMHG | HEART RATE: 63 BPM | DIASTOLIC BLOOD PRESSURE: 74 MMHG

## 2022-06-22 DIAGNOSIS — C44.529 SQUAMOUS CELL CARCINOMA OF SKIN OF CHEST: Primary | ICD-10-CM

## 2022-06-22 PROCEDURE — 1123F ACP DISCUSS/DSCN MKR DOCD: CPT | Performed by: PLASTIC SURGERY

## 2022-06-22 PROCEDURE — 99204 OFFICE O/P NEW MOD 45 MIN: CPT | Performed by: PLASTIC SURGERY

## 2022-06-22 NOTE — PROGRESS NOTES
Department of Plastic Surgery - Adult  Attending Consult Note      CHIEF COMPLAINT:   Squamous Cell Cancer of right chest     History Obtained From:  patient    HISTORY OF PRESENT ILLNESS:                The patient is a 80 y.o. 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  The patient states that they first noticed the lesion 2 months ago. It has grown in size since they first noticed the lesion. The lesion has not changed in color and has not had discharge or bleeding. The pt has had the lesion biopsied previously. The patient has not had the lesion removed previously. The patient states the lesion is painfull. The pt denies any associated symptoms. History of aortic dissection and CAD on 81 mg ASA, not a surgical candidate per Vascular Surgery. Former smoker quit in 1960s    Past Medical History:    Past Medical History:   Diagnosis Date    Acute basilic vein thrombosis, right 5/3/2022    Acute pancreatitis      AND     Anxiety     BPH (benign prostatic hyperplasia)     CAD (coronary artery disease)     GERD (gastroesophageal reflux disease)     Hyperlipidemia     Obsessive compulsive disorder     Psoriasis      Past Surgical History:    Past Surgical History:   Procedure Laterality Date    CATARACT REMOVAL Bilateral 2017    COLONOSCOPY  2017    THROAT SURGERY      UPPER GASTROINTESTINAL ENDOSCOPY  2017     Current Medications:   No current facility-administered medications for this visit. Allergies:  Patient has no known allergies.     Social History:   Social History     Socioeconomic History    Marital status:      Spouse name: Not on file    Number of children: Not on file    Years of education: Not on file    Highest education level: Not on file   Occupational History    Not on file   Tobacco Use    Smoking status: Former Smoker     Types: Cigarettes     Quit date: 10/24/1969     Years since quittin.6    headache, difficulty swallowing or nose bleeds. CARDIOVASCULAR:  negative for  chest pain, dyspnea, palpitations, syncope  GASTROINTESTINAL:  negative for nausea, vomiting, change in bowel habits, diarrhea, constipation and abdominal pain  EXTREMITIES: negative for edema  MUSCULOSKELETAL: negative for muscle weakness  SKIN: positive for lesion negative for itching or rashes. HEME: negative for easy brusing or bleeding  BEHAVIOR/PSYCH:  negative for poor appetite, increased appetite, decreased sleep and poor concentration  All other systems negative      PHYSICAL EXAM:    VITALS:  /74   Pulse 63   Temp 97.3 °F (36.3 °C) (Temporal)   Ht 5' 10\" (1.778 m)   Wt 160 lb 3.2 oz (72.7 kg)   SpO2 95%   BMI 22.99 kg/m²   CONSTITUTIONAL:  awake, alert, cooperative, no apparent distress, and appears stated age  EYES: PERRLA, EOMI, no signs of occular infection  LUNGS:  No increased work of breathing, good air exchange,   CARDIOVASCULAR:  Normal apical impulse, regular rate and rhythm,   EXTREMITIES: no signs of clubbing or cyanosis. MUSCULOSKELETAL: negative for flaccid muscle tone or spastic movements. NEURO: Cranial nerves II-XII grossly intact. No signs of agitated mood. SKIN: Squamous Cell Cancer of right upper chest-  20mm x 20mm,  red in color, irregular border, slightly raised, no signs of bleeding,drainage or infection. Non tender to palpation.     DATA:    Labs: CBC:   Lab Results   Component Value Date    WBC 10.9 05/01/2022    RBC 4.22 05/01/2022    HGB 12.4 05/01/2022    HCT 38.9 05/01/2022    MCV 92.2 05/01/2022    MCH 29.4 05/01/2022    MCHC 31.9 05/01/2022    RDW 13.9 05/01/2022     05/01/2022    MPV 10.4 05/01/2022     BMP:    Lab Results   Component Value Date     05/01/2022    K 4.0 05/01/2022     05/01/2022    CO2 23 05/01/2022    BUN 19 05/01/2022    LABALBU 3.0 05/01/2022    LABALBU 4.2 09/28/2011    CREATININE 1.2 05/01/2022    CALCIUM 9.0 05/01/2022    GFRAA >60 05/01/2022    LABGLOM 57 05/01/2022    GLUCOSE 110 05/01/2022    GLUCOSE 92 09/28/2011       Radiology Review:  No radiology needed at this time  Pathology Review:  Pathology reviewed   Squamous cell carcinoma- transected right chest     IMPRESSION/RECOMMENDATIONS:        Diagnosis  -) Squamous Cell Cancer of right upper chest       -The patient was counseled on their pathology results and the need for surgical   intervention.   -We will plan to proceed and have the lesion formely excised with margins in office .  -The patient will not require frozen sections in the operating room  -The patient will not require pre-op clearance from their PCP. -Excision of right chest Squamous Cell Cancer with possible local tissue rearrangement, possible full thickness skin graft.    -The risks, benefits and options were discussed with the pt. The risks included but not limited to pain, bleeding, infection, heavy scarring, damage to surrounding structures, fluid collections, asymmetry, and need for further procedures. All of His questions were answered to their satisfaction and He agrees to proceed with the procedure. Follow up day of procedure. Seen, examined, discussed with Dr. Danelle Hutton      Attending Physician Statement  I have discussed the case, including pertinent history and exam findings with the resident. I have seen and examined the patient and the key elements of all parts of the encounter have been performed by me. I agree with the assessment, exam, plan and orders as documented by the resident. I attest that the patient was seen and examined by me, and concur with the documentation above. I agree with the assessment and the plan outlined. This document is generated, in part, by voice recognition software and thus  syntax and grammatical errors are possible.     Shivam Latif

## 2022-06-29 ENCOUNTER — TELEPHONE (OUTPATIENT)
Dept: PHARMACY | Facility: CLINIC | Age: 87
End: 2022-06-29

## 2022-06-29 NOTE — TELEPHONE ENCOUNTER
[facility] CLINICAL PHARMACY: ADHERENCE REVIEW  Identified care gap per [place]: fills at [facility]: ACE/ARB and Statin adherence    ASSESSMENT  ACE/ARB ADHERENCE    Insurance Records claims through 6/20/22 (YTD [place] = 82%; Potential Fail Date: 7/31/22): Lisinopril 20mg due to refill on 5/15/22. Last filled 14 day supply. Per Reconciled Dispense Report:  Lisinopril 20mg last filled on 6/3/22 for 30 day supply. It looks like patient is using [facility] and getting pill packs now. Unsure how they are billing prescriptions    BP Readings from Last 3 Encounters:   06/22/22 126/74   06/10/22 138/70   05/10/22 86/60     Estimated Creatinine Clearance: 45 mL/min (based on SCr of 1.2 mg/dL). [address] Records claims through 6/20/22 (Prior Year [place] = PASSED; YTD [employer] [place] = Filled only once; Potential Fail Date: 7/4/22): Rosuvastatin 10mg due to refill on 5/17/22. Last filled 14 day supply. Per Reconciled Dispense Report:  Rosuvastatin 10mg last filled on 6/3/22 for 30 day supply. It looks like patient is using [facility] and getting pill packs now. Unsure how they are billing prescriptions    Lab Results   Component Value Date    CHOL 102 04/13/2022    TRIG 72 04/13/2022    HDL 29 04/13/2022    LDLCALC 59 04/13/2022     ALT   Date Value Ref Range Status   05/01/2022 16 0 - 40 U/L Final     AST   Date Value Ref Range Status   05/01/2022 16 0 - 39 U/L Final     The ASCVD Risk score (Dayana Grace et al., 2013) failed to calculate for the following reasons: The 2013 ASCVD risk score is only valid for ages 36 to 78     PLAN  The following are interventions that have been identified:   - Patient overdue refilling Rosuvastatin and lisinopril and active on home medication list.     [facility] and verified that he has not filled either medication yet, but they do have prescriptions on file.  They said that he called them on 6/27 and asked for things to be held because

## 2022-06-30 NOTE — TELEPHONE ENCOUNTER
Patient returned call in regards to VM left. Stated that he has supply on hand and is waiting until he sees the MD before getting any refills in fear of medications changing. He had an appt today 6/30/2022, but it has been rescheduled to 7/7/2022 due to MD having an emergency. Once he sees MD he will contact [facility] to get medications refilled.

## 2022-06-30 NOTE — TELEPHONE ENCOUNTER
Pt refusing to refill anything until next appt . Will sign off.        Alexa Grace, PharmD, [address] [facility]  Department, toll free: [phone]    For Pharmacy [address] in place:  No   Recommendation Provided To: Patient/Caregiver: 2 via Telephone and Pharmacy: 2   Intervention Detail: Adherence Monitorin and Refill(s) Provided   Gap Closed?: No    Intervention Accepted By: Patient/Caregiver: 0 and Pharmacy: 2   Time Spent (min): 20

## 2022-07-08 ENCOUNTER — OFFICE VISIT (OUTPATIENT)
Dept: FAMILY MEDICINE CLINIC | Age: 87
End: 2022-07-08
Payer: MEDICARE

## 2022-07-08 VITALS
SYSTOLIC BLOOD PRESSURE: 122 MMHG | DIASTOLIC BLOOD PRESSURE: 70 MMHG | RESPIRATION RATE: 16 BRPM | BODY MASS INDEX: 23.48 KG/M2 | TEMPERATURE: 97.7 F | OXYGEN SATURATION: 95 % | HEART RATE: 73 BPM | HEIGHT: 70 IN | WEIGHT: 164 LBS

## 2022-07-08 DIAGNOSIS — S01.112A LACERATION OF LEFT EYEBROW, INITIAL ENCOUNTER: Primary | ICD-10-CM

## 2022-07-08 DIAGNOSIS — S50.812A ABRASION OF LEFT FOREARM, INITIAL ENCOUNTER: ICD-10-CM

## 2022-07-08 PROCEDURE — 1123F ACP DISCUSS/DSCN MKR DOCD: CPT

## 2022-07-08 PROCEDURE — 99213 OFFICE O/P EST LOW 20 MIN: CPT

## 2022-07-08 NOTE — PROGRESS NOTES
Chief Complaint       Laceration (Left arm, hand and wrist and forehead. He tripped on an uneven sidewalk and fell into a tree about 20 minutes ago. )    History of Present Illness   Source of history provided by:  patient. Amy Mathur is a 80 y.o. old male presenting to the walk in clinic for a laceration to the left eyebrow and abrasions to the left wrist and posterior forearm, caused by tripping on an uneven sidewalk and scraping against a tree, which occurred 30 minutes prior to arrival.  Pt states bleeding is controlled and there is no N/T to the site. The patients tetanus status is UTD, 2017. Pt denies any significant pain at the site, loss of function to the area, fever, chills, lethargy, N/V, or syncope. ROS    Unless otherwise stated in this report or unable to obtain because of the patient's clinical or mental status as evidenced by the medical record, this patients's positive and negative responses for Review of Systems, constitutional, psych, eyes, ENT, cardiovascular, respiratory, gastrointestinal, neurological, genitourinary, musculoskeletal, integument systems and systems related to the presenting problem are either stated in the preceding or were not pertinent or were negative for the symptoms and/or complaints related to the medical problem. Physical Exam         VS:  /70   Pulse 73   Temp 97.7 °F (36.5 °C) (Temporal)   Resp 16   Ht 5' 10\" (1.778 m)   Wt 164 lb (74.4 kg)   SpO2 95%   BMI 23.53 kg/m²    Oxygen Saturation Interpretation: Normal.    Constitutional:  Alert, development consistent with age. Chest: Heart RRR without pathologic murmurs or gallops. Lungs CTAB without W/R/R. Skin: 2cm laceration to the left eyebrow tail noted. No current active bleeding. Wound explored extensively and no FB present. 6 cm linear abrasion to the posterior left forearm and 2 small 1 cm abrasions to the wrist.  No tendon laceration noted. FROM without deficits or weakness. Distal sensation intact. Cap refill less then 2 sec. Lymphatics: No lymphangitis or adenopathy noted. Neurological:  Alert and oriented. Motor functions intact. Lab / Imaging Results   (All laboratory and radiology results have been personally reviewed by myself)  Labs:  No results found for this visit on 07/08/22. Imaging: All Radiology results interpreted by Radiologist unless otherwise noted. Assessment / Plan     Impression(s):  Charlene Zaidi was seen today for laceration. Diagnoses and all orders for this visit:    Laceration of left eyebrow, initial encounter    Abrasion of left forearm, initial encounter  -     mupirocin (BACTROBAN) 2 % ointment; Apply topically 3 times daily. Disposition:  Disposition: Discharge to home. Abrasions cleansed extensively with normal saline and wound cleanser. Topical antibiotic and sterile dressings applied. Eyebrow laceration cleansed and wound closed with skin glue. Wound care measures discussed at length. ED sooner if symptoms worsen or change. ED immediately with signs of secondary infection including surrounding erythema, swelling, increased tenderness, or purulent discharge. ED with any weakness, paresthesias, or uncontrolled bleeding. Pt states understanding and is in agreement with this care plan. All questions answered. PRATIBHA Pro    **This report was transcribed using voice recognition software. Every effort was made to ensure accuracy; however, inadvertent computerized transcription errors may be present.

## 2022-07-13 ENCOUNTER — PROCEDURE VISIT (OUTPATIENT)
Dept: SURGERY | Age: 87
End: 2022-07-13
Payer: MEDICARE

## 2022-07-13 ENCOUNTER — INITIAL CONSULT (OUTPATIENT)
Dept: SURGERY | Age: 87
End: 2022-07-13
Payer: MEDICARE

## 2022-07-13 VITALS
WEIGHT: 164 LBS | BODY MASS INDEX: 23.48 KG/M2 | HEIGHT: 70 IN | OXYGEN SATURATION: 96 % | SYSTOLIC BLOOD PRESSURE: 133 MMHG | HEART RATE: 55 BPM | TEMPERATURE: 97.9 F | RESPIRATION RATE: 20 BRPM | DIASTOLIC BLOOD PRESSURE: 75 MMHG

## 2022-07-13 VITALS
WEIGHT: 160 LBS | BODY MASS INDEX: 22.9 KG/M2 | HEART RATE: 84 BPM | TEMPERATURE: 97.3 F | DIASTOLIC BLOOD PRESSURE: 64 MMHG | SYSTOLIC BLOOD PRESSURE: 102 MMHG | OXYGEN SATURATION: 95 % | HEIGHT: 70 IN

## 2022-07-13 DIAGNOSIS — K40.90 LEFT INGUINAL HERNIA: Primary | ICD-10-CM

## 2022-07-13 DIAGNOSIS — C44.529 SQUAMOUS CELL CARCINOMA OF SKIN OF CHEST: Primary | ICD-10-CM

## 2022-07-13 PROCEDURE — 12032 INTMD RPR S/A/T/EXT 2.6-7.5: CPT | Performed by: PLASTIC SURGERY

## 2022-07-13 PROCEDURE — 11603 EXC TR-EXT MAL+MARG 2.1-3 CM: CPT | Performed by: PLASTIC SURGERY

## 2022-07-13 PROCEDURE — 1123F ACP DISCUSS/DSCN MKR DOCD: CPT | Performed by: SURGERY

## 2022-07-13 PROCEDURE — 99203 OFFICE O/P NEW LOW 30 MIN: CPT | Performed by: SURGERY

## 2022-07-13 RX ORDER — LIDOCAINE HYDROCHLORIDE AND EPINEPHRINE 10; 10 MG/ML; UG/ML
3 INJECTION, SOLUTION INFILTRATION; PERINEURAL ONCE
Status: COMPLETED | OUTPATIENT
Start: 2022-07-13 | End: 2022-07-13

## 2022-07-13 RX ADMIN — LIDOCAINE HYDROCHLORIDE AND EPINEPHRINE 3 ML: 10; 10 INJECTION, SOLUTION INFILTRATION; PERINEURAL at 16:11

## 2022-07-13 NOTE — PROGRESS NOTES
Subjective: Follow up today for excision of right upper chest squamous cell carcinoma. Denies fever, nausea, vomiting, leg pain or swelling. The patient voices understanding of the procedure they are having today and would like to proceed. Patient states that the mass has been painful and growing. Objective:    /64 (Site: Left Upper Arm, Position: Sitting, Cuff Size: Medium Adult)   Pulse 84   Temp 97.3 °F (36.3 °C) (Temporal)   Ht 5' 10\" (1.778 m)   Wt 160 lb (72.6 kg)   SpO2 95%   BMI 22.96 kg/m²       Right upper chest squamous cell carcinoma  Lesion- 15mm x 15mm  Margin- 4mm  Defect- 23mm x 23mm  Scar-40mm         Assessment:    Patient Active Problem List   Diagnosis    Psoriasis    BPH (benign prostatic hyperplasia)    CAD (coronary artery disease)    Obsessive neurosis    Essential hypertension    Irritable bowel syndrome with diarrhea    Hiatal hernia with GERD    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HCC)    Moderate protein-calorie malnutrition (HCC)    Acute basilic vein thrombosis, right       Plan:       Diagnosis  -) Squamous cell carcinoma right upper chest  -) Pain    -The risks, benefits and options were discussed with the pt. The risks included but not limited to pain, bleeding, infection, heavy scarring, damage to surrounding structures, fluid collections, asymmetry, and need for further procedures. All of His questions were answered to their satisfaction and He agrees to proceed with the procedure.      -After consent was obtained, the area was cleansed with an alcohol swab. Local anesthesia consisting of 1% lidocaine with 1:100,000 epinepherine was injected  surrounding the area. The local was allowed to work. Using a 10-blade the Squamous cell carcinoma right upper chest was excised,  intermediate repair was performed.   The wound(s) were closed with 3-0 monocryl and 4-0 monocryl hemostasis was obtained and a steristrip with tegaderm dressing was placed over the wound.  The procedure was performed by Dr Norah Marquez         Please schedule an appointment to be seen on Follow-up with our office in 7-14 days  Diet: regular diet  Activity: no heavy lifting for 7 days. Shower/Bathing: OK to shower over the wound site in 48 hours. No baths, hot tubs or soaking of the wound site at this time. Pt voices understanding. There is a dressing in place steri strips and Tegaderm over the wound site. This will remain intact until seen in our office. Medications: As prescribed  Educated to contact physician with concerns or signs of infection (redness, increasing pain, discharge, odor, fever).     The entirety of the procedure was performed by Dr. Norah Marquez with first assistance by PRATIBHA Cyr      Please note that the medical decision making for the patient as well as the subjective, objective, assessment and plan were reviewed and performed by Dr Gucci Chang MD

## 2022-07-13 NOTE — PROGRESS NOTES
General Surgery History and Physical  [facility]    Patient's Name/Date of Birth: Linden Guallpa / 1935    Date: July 13, 2022     Surgeon: Migue Moncada M.D.    PCP: Isiah Stovall DO     Chief Complaint: Left inguinal bulge    HPI:   Linden Guallpa is a 80 y.o. male who presents for evaluation of left inguinal hernia. Timing is intermittent, radiation to left groin left abdominal wall, alleviated by none and started months ago and severity is 8/10. States pain has been worsening, no symptoms of bowel obstruction, nausea, vomiting, fever, chills, abdominal pain. States it protrudes with activity, it is reducible. Patient with a history of COVID in January 2022 and since then has had significant health complications resulting from the hospital stay. Complain of some lower abdominal pain and left groin pain. He states he does have a bulge in the area and he did not associate this with the hernia for most of our discussion until I showed it to them. Patient also has known history of a very large paraesophageal hernia for which she is relatively asymptomatic and denies any bloating nausea regurgitation of food.   He did have some weight loss during his hospitalization for COVID but he seems to have significantly improved from this    Patient Active Problem List   Diagnosis    Psoriasis    BPH (benign prostatic hyperplasia)    CAD (coronary artery disease)    Obsessive neurosis    Essential hypertension    Irritable bowel syndrome with diarrhea    Hiatal hernia with GERD    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[address]    Moderate protein-calorie malnutrition ([address]    Acute basilic vein thrombosis, right       Past Medical History:   Diagnosis Date    Acute basilic vein thrombosis, right 5/3/2022    Acute pancreatitis     6/06 AND 11/09    Anxiety     BPH (benign prostatic hyperplasia)     CAD (coronary artery disease)     GERD (gastroesophageal reflux disease)     Hyperlipidemia  Obsessive compulsive disorder     Psoriasis        Past Surgical History:   Procedure Laterality Date    CATARACT REMOVAL Bilateral 2017    COLONOSCOPY  2017    THROAT SURGERY      UPPER GASTROINTESTINAL ENDOSCOPY  2017       No Known Allergies    The patient has a family history that is negative for severe cardiovascular or respiratory issues, negative for reaction to anesthesia. Time spent reviewing past medical, surgical, social and family history, vitals, nursing assessment and images. No changes from above documented history. Social History     Socioeconomic History    Marital status:      Spouse name: Not on file    Number of children: Not on file    Years of education: Not on file    Highest education level: Not on file   Occupational History    Not on file   Tobacco Use    Smoking status: Former Smoker     Types: Cigarettes     Quit date:      Years since quittin.5    Smokeless tobacco: Never Used   Substance and Sexual Activity    Alcohol use: Yes     Comment: OCCASIONAL BEER    Drug use: No    Sexual activity: Not on file   Other Topics Concern    Not on file   Social History Narrative    Not on file     Social Determinants of Health     Financial Resource Strain: Low Risk     Difficulty of Paying Living Expenses: Not hard at all   Food Insecurity: No Food Insecurity    Worried About [address] [facility] in the Last Year: Never true    [facility] in the Last Year: Never true   Transportation Needs:     Lack of Transportation (Medical): Not on file    Lack of Transportation (Non-Medical):  Not on file   Physical Activity:     Days of Exercise per Week: Not on file    Minutes of Exercise per Session: Not on file   Stress:     Feeling of Stress : Not on file   Social Connections:     Frequency of Communication with Friends and Family: Not on file    Frequency of Social Gatherings with Friends and Family: Not on file    Attends [employer] Services: Not on file    Active Member of Clubs or Organizations: Not on file    Attends Club or Organization Meetings: Not on file    Marital Status: Not on file   Intimate Partner Violence:     Fear of Current or Ex-Partner: Not on file    Emotionally Abused: Not on file    Physically Abused: Not on file    Sexually Abused: Not on file   Housing Stability:     Unable to Pay for Housing in the Last Year: Not on file    Number of [place] in the Last Year: Not on file    Unstable Housing in the Last Year: Not on file         A complete 10 system review was performed and are otherwise negative unless mentioned in the above HPI. Specific negatives are listed below but may not include all those reviewed.     General ROS: negative obtundation, AMS  ENT ROS: negative rhinorrhea, epistaxis  Allergy and Immunology ROS: negative itchy/watery eyes or nasal congestion  Hematological and Lymphatic ROS: negative spontaneous bleeding or bruising  Endocrine ROS: negative  lethargy, mood swings, palpitations or polydipsia/polyuria  Respiratory ROS: negative sputum changes, stridor, tachypnea or wheezing  Cardiovascular ROS: negative for - loss of consciousness, murmur or orthopnea  Gastrointestinal ROS: negative for - hematochezia or hematemesis  Genito-Urinary ROS: negative for -  genital discharge or hematuria  Musculoskeletal ROS: negative for - focal weakness, gangrene  Psych/Neuro ROS: negative for - visual or auditory hallucinations, suicidal ideation    Physical exam:   /75   Pulse 55   Temp 97.9 °F (36.6 °C)   Resp 20   Ht 5' 10\" (1.778 m)   Wt 164 lb (74.4 kg)   SpO2 96%   BMI 23.53 kg/m²   General appearance:  NAD, appears stated age  Head: NCAT, PERRLA, EOMI, red conjunctiva  Neck: supple, no masses, trachea midline  Lungs: Equal chest rise bilateral, no retractions, no wheezing  Heart: Reg rate  Abdomen: soft, nontender nondistended  Skin; warm and dry, no cyanosis  Gu: no cva tenderness, partially reducible left inguinal hernia containing sigmoid colon  Extremities: atraumatic, no focal motor deficits, no open wounds  Psych: No tremor, visual hallucinations      Radiology:  Radiology: I reviewed relevant abdominal imaging from this admission and that available in the EMR including CT abd/pel from May 2022. My assessment is left inguinal hernia containing sigmoid colon, large paraesophageal hernia          Assessment:  Felisa Gregorio is a 80 y.o. male with left inguinal hernia, nonrecurrent, large paraesophageal hernia  Patient Active Problem List   Diagnosis    Psoriasis    BPH (benign prostatic hyperplasia)    CAD (coronary artery disease)    Obsessive neurosis    Essential hypertension    Irritable bowel syndrome with diarrhea    Hiatal hernia with GERD    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HCC)    Moderate protein-calorie malnutrition (HCC)    Acute basilic vein thrombosis, right         Plan:    I discussed the significant risks of proceeding to the OR for inguinal hernia repair  Pain seems to be improving after what likely was an acute insult.   There is no evidence of incarceration or strangulation and therefore would defer surgical intervention to his preference  He seems indifferent about repairing it and this does not seem to be pushing us to proceed or  He currently has a follow-up with plastic surgery regarding a squamous cell cancer that was found on his chest  Will defer management of this to plastic surgery and await resolution of the acute work-up  Should he show signs of significant worsening bowel obstruction or worsening pain or obstipation would recommend OR for laparoscopic robot assisted left inguinal hernia repair with mesh possible bilateral  Discussed the risk, benefits and alternatives of surgery including wound infections, bleeding, scar, recurrent hernia formation, mesh infection and migration, chronic groin pain, nerve injury, testicle injury, repeat procedures and the risks of general anesthetic including MI, CVA, sudden death or reactions to anesthetic medications. The patient understands the risks and alternatives and the possibility of converting to an open procedure. All questions were answered to the patient's satisfaction and they freely signed the consent.      Follow up as needed      Ruddy Hansen MD  11:28 AM  7/13/2022

## 2022-07-15 ENCOUNTER — TELEPHONE (OUTPATIENT)
Dept: VASCULAR SURGERY | Age: 87
End: 2022-07-15

## 2022-07-15 DIAGNOSIS — I71.03 DISSECTION OF THORACOABDOMINAL AORTA (HCC): Primary | ICD-10-CM

## 2022-07-15 DIAGNOSIS — I71.019 DISSECTION OF AORTA, THORACIC: Primary | ICD-10-CM

## 2022-07-25 ENCOUNTER — OFFICE VISIT (OUTPATIENT)
Dept: SURGERY | Age: 87
End: 2022-07-25

## 2022-07-25 VITALS
HEART RATE: 82 BPM | OXYGEN SATURATION: 96 % | BODY MASS INDEX: 22.96 KG/M2 | WEIGHT: 155 LBS | HEIGHT: 69 IN | TEMPERATURE: 99 F

## 2022-07-25 DIAGNOSIS — I71.03 DISSECTION OF THORACOABDOMINAL AORTA (HCC): ICD-10-CM

## 2022-07-25 DIAGNOSIS — C44.529 SQUAMOUS CELL CARCINOMA OF SKIN OF CHEST: Primary | ICD-10-CM

## 2022-07-25 PROCEDURE — 99024 POSTOP FOLLOW-UP VISIT: CPT | Performed by: PHYSICIAN ASSISTANT

## 2022-07-25 NOTE — PROGRESS NOTES
Subjective: Follow up today from an of right chest squamous cell carcinoma. Denies fever, nausea, vomiting, leg pain or swelling, pain is absent. The pt states that they have  kept the wound site(s) covered with steri strips. They state that their pain is absent. Objective:    Pulse 82   Temp 99 °F (37.2 °C) (Temporal)   Ht 5' 9\" (1.753 m)   Wt 155 lb (70.3 kg)   SpO2 96%   BMI 22.89 kg/m²       Wound: Clean, dry, and intact, no drainage. No signs of infection, wound healing well    Neuro- Sensation  intact to miracle incisional site with light touch .  Assessment:    Patient Active Problem List   Diagnosis    Psoriasis    BPH (benign prostatic hyperplasia)    CAD (coronary artery disease)    Obsessive neurosis    Essential hypertension    Irritable bowel syndrome with diarrhea    Hiatal hernia with GERD    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HCC)    Moderate protein-calorie malnutrition ([address]    Acute basilic vein thrombosis, right       Labs: CBC:   Lab Results   Component Value Date/Time    WBC 10.9 05/01/2022 04:08 AM    RBC 4.22 05/01/2022 04:08 AM    HGB 12.4 05/01/2022 04:08 AM    HCT 38.9 05/01/2022 04:08 AM    MCV 92.2 05/01/2022 04:08 AM    MCH 29.4 05/01/2022 04:08 AM    MCHC 31.9 05/01/2022 04:08 AM    RDW 13.9 05/01/2022 04:08 AM     05/01/2022 04:08 AM    MPV 10.4 05/01/2022 04:08 AM     BMP:    Lab Results   Component Value Date/Time     05/01/2022 04:08 AM    K 4.0 05/01/2022 04:08 AM     05/01/2022 04:08 AM    CO2 23 05/01/2022 04:08 AM    BUN 19 05/01/2022 04:08 AM    LABALBU 3.0 05/01/2022 04:08 AM    LABALBU 4.2 09/28/2011 09:30 AM    CREATININE 1.2 05/01/2022 04:08 AM    CALCIUM 9.0 05/01/2022 04:08 AM    GFRAA >60 05/01/2022 04:08 AM    LABGLOM 57 05/01/2022 04:08 AM    GLUCOSE 110 05/01/2022 04:08 AM    GLUCOSE 92 09/28/2011 09:30 AM         Pathology Report- [place] [facility]   [address]      [address]            [address] [facility] [place], [address] [place] [address], [address], 17 [facility], [address] [place] [address] Drive               FINAL SURGICAL PATHOLOGY REPORT     NAME:            Lopez Chaudhry             Date of       07/13/2022                                            Collection:   Medical Record   [id number]              Date of       07/14/2022   Number:                                  Receipt:   Age:  80 Y        Sex:  M                Date          07/19/2022 11:56                                            Reported:   YOB: 1935   Financial        [id number]            Admitting     ARIEL TORRES   Number:                                  Physician:   Patient          Elly Cornejo                   Ordering      ARIEL TORRES   Location:                                Physician:     Accession Number:  [id number]                                            Additional Physicians:BRUCE Lyanne Curling       Diagnosis:   Skin lesion right chest: Squamous carcinoma, margins of excision are   negative for neoplasm. Pao Weir M.D.                                        (Electronic Signature)     Plan:     Educated the pt on their pathology report. Pt voices understanding      Dressing -None  Showering at this time -OK    Pt was instructed on scar massage  Scar Care Instructions      Sunscreen Recommendations for Scars  We recommend that all patients use a sunscreen when outside but especially on new scars (that are exposed to sunlight) for a year after their procedure. The SPF should be at least 28. Follow the application directions on the bottle. Why is it so Important to Use Sunscreen on Scars?   A scar is new and more fragile than the surrounding skin. If you do not use sunscreen, the scar line will react differently to the sun than the surrounding skin. If you don't use sunscreen, the scar tissue will become darker than surrounding skin. This is a hyper-pigmented scar and will remain darker than the other skin. After one year, the scar and surrounding skin should react equally to sun. Superficial Scar Massage  Scar massage desensitizes and reduces scar adhesions so skin glides freely. Rub in a circular motion on and around the scar with firm, even pressure for 5 minutes four times per day   You can start scar massage once incision is completely healed and strong enough to handle the motion (usually 10 - 14 days post operatively). You use lotion to do the scar massage to allow ease with motion over the scar and prevent friction at the area. Patient had no further questions. F/U PRN  Call office with concerns or signs of infection.     Samantha Eng, [address]   12:55 PM  7/25/2022

## 2022-07-26 ENCOUNTER — TELEPHONE (OUTPATIENT)
Dept: VASCULAR SURGERY | Age: 87
End: 2022-07-26

## 2022-07-26 NOTE — TELEPHONE ENCOUNTER
Scheduled CTA chest at [facility]) 7/28/22 at 3:00 pm, ov with Dr. Torsten Duvall 8/1/22 at 11:15 a.m, message including instructions for CTA left on pt's voicemail.

## 2022-07-28 ENCOUNTER — HOSPITAL ENCOUNTER (OUTPATIENT)
Dept: CT IMAGING | Age: 87
Discharge: HOME | End: 2022-07-30
Payer: MEDICARE

## 2022-07-28 DIAGNOSIS — I71.019 DISSECTION OF AORTA, THORACIC: ICD-10-CM

## 2022-07-28 PROCEDURE — 71275 CT ANGIOGRAPHY CHEST: CPT

## 2022-07-28 PROCEDURE — 6360000004 HC RX CONTRAST MEDICATION: Performed by: RADIOLOGY

## 2022-07-28 RX ADMIN — IOPAMIDOL 75 ML: 755 INJECTION, SOLUTION INTRAVENOUS at 14:52

## 2022-07-29 ENCOUNTER — TELEPHONE (OUTPATIENT)
Dept: VASCULAR SURGERY | Age: 87
End: 2022-07-29

## 2022-08-01 ENCOUNTER — OFFICE VISIT (OUTPATIENT)
Dept: VASCULAR SURGERY | Age: 87
End: 2022-08-01
Payer: MEDICARE

## 2022-08-01 VITALS — HEIGHT: 70 IN | WEIGHT: 160 LBS | BODY MASS INDEX: 22.9 KG/M2

## 2022-08-01 DIAGNOSIS — I71.9 PENETRATING ATHEROSCLEROTIC ULCER OF AORTA (HCC): Primary | ICD-10-CM

## 2022-08-01 PROCEDURE — 99215 OFFICE O/P EST HI 40 MIN: CPT | Performed by: SURGERY

## 2022-08-01 PROCEDURE — 1123F ACP DISCUSS/DSCN MKR DOCD: CPT | Performed by: SURGERY

## 2022-08-01 NOTE — PROGRESS NOTES
Vascular Surgery Outpatient Followup    PCP : Kennedy Leigh DO  Cardiologist : Dr. Rafael Pimentel:    This is a 80 y.o. male who presents in fu regarding descending thoracic aortic dissection, aneurysm. He was initially admitted in 3 of 2022 with an acute descending thoracic dissection and noted to have acute hypertension. He was started on medication and blood pressure was better controlled. He was readmitted 5/2022 and was noted on follow-up imaging to have resolution for the most part of his dissection, but there was a concern of whether a penetrating aortic ulcer had developed. Reimaging was ordered and on it it is in fact noted to be a penetrating aortic ulcer. He denies any significant abdominal pain. He denies any significant chest pain. He does still have some intermittent issues with back pain. While admitted 6 of 2022 he had developed a superficial thrombophlebitis involving his basilic vein where an IV was. It subsequently resolved on follow-up ultrasound. Past Medical History:        Diagnosis Date    Acute basilic vein thrombosis, right 5/3/2022    Acute pancreatitis     6/06 AND 11/09    Anxiety     BPH (benign prostatic hyperplasia)     CAD (coronary artery disease)     GERD (gastroesophageal reflux disease)     Hyperlipidemia     Obsessive compulsive disorder     Psoriasis      Past Surgical History:        Procedure Laterality Date    CATARACT REMOVAL Bilateral 08/24/2017    COLONOSCOPY  03/14/2017    THROAT SURGERY      UPPER GASTROINTESTINAL ENDOSCOPY  03/14/2017     Current Medications:   Current Outpatient Medications   Medication Sig Dispense Refill    mupirocin (BACTROBAN) 2 % ointment Apply topically 3 times daily.  3 g 0    colestipol (COLESTID) 1 g tablet TAKE 1 TAB BY MOUTH 2 TIMES A DAY AS NEEDED FOR LOOSE STOOLS 1 HOUR PRIOR OR 4 HOURS AFTER OTHER MED      colesevelam (WELCHOL) 625 MG tablet Take 1 tablet by mouth 2 times daily (with meals) 180 tablet 3    ferrous sulfate (IRON 325) 325 (65 Fe) MG tablet Take 1 tablet by mouth daily (with breakfast) 90 tablet 3    PARoxetine (PAXIL) 20 MG tablet ONE DAILY 90 tablet 3    metoprolol (LOPRESSOR) 100 MG tablet Take 1 tablet by mouth 3 times daily 180 tablet 3    lisinopril (PRINIVIL;ZESTRIL) 20 MG tablet Take 1 tablet by mouth daily 90 tablet 3    rosuvastatin (CRESTOR) 10 MG tablet TAKE 1 TABLET BY MOUTH AT  NIGHT 90 tablet 3    furosemide (LASIX) 40 MG tablet Take 1 tablet by mouth daily 90 tablet 3    Probiotic Product (VSL#3) CAPS TAKE 2 CAPSULES BY MOUTH EVERY DAY      mirtazapine (REMERON) 15 MG tablet Take 1 tablet by mouth nightly 90 tablet 3    esomeprazole Magnesium (NEXIUM) 40 MG PACK Take 40 mg by mouth daily      aspirin 81 MG chewable tablet Take 1 tablet by mouth daily 30 tablet 3    Bismuth Subsalicylate (PEPTO-BISMOL PO) Take by mouth as needed      Multiple Vitamins-Minerals (VITEYES AREDS FORMULA/LUTEIN PO) Take by mouth      amLODIPine (NORVASC) 10 MG tablet Take 1 tablet by mouth daily 90 tablet 0     No current facility-administered medications for this visit. Allergies:  Patient has no known allergies.   Social History     Socioeconomic History    Marital status:      Spouse name: Not on file    Number of children: Not on file    Years of education: Not on file    Highest education level: Not on file   Occupational History    Not on file   Tobacco Use    Smoking status: Former     Types: Cigarettes     Quit date:      Years since quittin.6    Smokeless tobacco: Never   Substance and Sexual Activity    Alcohol use: Yes     Comment: OCCASIONAL BEER    Drug use: No    Sexual activity: Not on file   Other Topics Concern    Not on file   Social History Narrative    Not on file     Social Determinants of Health     Financial Resource Strain: Low Risk     Difficulty of Paying Living Expenses: Not hard at all   Food Insecurity: No Food Insecurity    Worried About Running Out of Food in the Last Year: Never true    Ran Out of Food in the Last Year: Never true   Transportation Needs: Not on file   Physical Activity: Not on file   Stress: Not on file   Social Connections: Not on file   Intimate Partner Violence: Not on file   Housing Stability: Not on file     Family History   Problem Relation Age of Onset    Asthma Mother     Coronary Art Dis Mother     Parkinsonism Father      Labs  Lab Results   Component Value Date    WBC 10.9 05/01/2022    HGB 12.4 (L) 05/01/2022    HCT 38.9 05/01/2022     05/01/2022    PROTIME 13.7 (H) 05/01/2022    INR 1.2 05/01/2022    APTT 27.5 05/01/2022    K 4.0 05/01/2022    BUN 19 05/01/2022    CREATININE 1.2 05/01/2022       PHYSICAL EXAM:    Ht 5' 10\" (1.778 m)   Wt 160 lb (72.6 kg)   BMI 22.96 kg/m²   CONSTITUTIONAL:   Awake, alert, cooperative  PSYCHIATRIC :  Oriented to time, place and person     Appropriate insight to disease process  EYES: Lids and lashes normal  ENT:  External ears and nose without lesions   Hearing deficits noted  NECK: Supple, symmetrical, trachea midline   Carotid bruit not noted  LUNGS:  No increased work of breathing                 Clear to auscultation  CARDIOVASCULAR:  regular rate and rhythm   ABDOMEN:  soft, non-distended, non-tender   Aorta is not palpable  SKIN:   Normal skin color   Texture and turgor normal, no induration  EXTREMITIES:   R LE Edema absent  L LE Edema absent  R femoral 2+ L femoral 2+     RADIOLOGY:  CT reviewed  Penetrating aortic ulcer present    A/P Descending thoracic aorta - Penetrating aortic ulcer  We discussed the risk of rupture being about 50% at 2 years  The overall appearance is worsened since last seen and is more consistent with a PACU as compared to what was previously thought to be a dissection  We discussed surgical intervention versus nonoperative management  We also discussed with surgical intervention there was concern of paralysis because of the location  I reviewed the procedure with the patient and family as available. I discussed the procedure, risks, benefits, complications, and alternatives of the procedure. They understand and consent.   All questions were answered  We will plan on proceeding with [facility] anesthesia  He has been seen by Dr. Kassidy Montes 6/2022 from a cardiac standpoint - and felt it was reasonable to proceed  Will plan on TEVAR  Considering previous aortic dissection is not well seen would not endeavor to treated at the level of the subclavian and extend down  Would ideally just treat the area where the penetrating aortic ulcer is with a short 10 cm stent graft    Lela Brunner MD

## 2022-08-18 NOTE — PROGRESS NOTES
Patient was given 2 dates to choose for PAT. He will check with his family ride and return our call.

## 2022-08-24 NOTE — PROGRESS NOTES
[address]   PRE-ADMISSION TESTING GENERAL INSTRUCTIONS  [facility] Phone Number: [phone]      GENERAL INSTRUCTIONS:    [x] Antibacterial Soap Shower Night before and/or AM of surgery. [x] Do not wear makeup, lotions, powders, deodorant. [x] Nothing by mouth after midnight; including gum, candy, mints, or water. [x] You may brush your teeth, gargle, but do NOT swallow water. [x] No tobacco products, illegal drugs, or alcohol within 24 hours of your surgery. [x] Jewelry or valuables should not be brought to the hospital. All body and/or tongue piercing's must be removed prior to arriving to hospital. No contact lens or hair pins. [x] Bring insurance card and photo ID. [x] Bring copy of living will or healthcare power of  papers to be placed in your electronic record. [x] Transfusion Bracelet: Please bring with you to hospital, day of surgery. PARKING INSTRUCTIONS:     [x] ARRIVAL DATE & TIME: 9/8 at 5:30 am  [x] Enter into the [employer]. Two people may accompany you. Masks are required. [x] Parking Lot \"I\" is where you will park. It is located on the corner of [facility] [address] and [facility] [address] [facility]. The entrance is on [address]. Upon entering the parking lot, a voucher ticket will print    EDUCATION INSTRUCTIONS:      [x] Pre-admission Testing educational folder given  [x] Incentive Spirometry,coughing & deep breathing exercises reviewed. [x] Medication information sheet(s)   [x] Fluoroscopy-Xray used in surgery reviewed with patient. Educational pamphlet placed in chart. [x] Pain: Post-op pain is normal and to be expected. You will be asked to rate your pain from 0-10. MEDICATION INSTRUCTIONS:    [x] Bring a complete list of your medications, please write the last time you took the medicine, give this list to the nurse in Pre-Op.   [x] Take only the following medications the morning of surgery with 1-2 ounces of water: paxil, amlodipine, nexium  [x] Stop all herbal supplements and vitamins 5 days before surgery. Stop NSAIDS 7 days before surgery. [x] Follow physician instructions regarding any blood thinners you may be taking. WHAT TO EXPECT:    [x] The day of surgery you will be greeted and checked in by the [employer].  In addition, you will be registered in the [facility] by a Patient Access Representative. Please bring your photo ID and insurance card. A nurse will greet you in accordance to the time you are needed in the pre-op area to prepare you for surgery. Please do not be discouraged if you are not greeted in the order you arrive as there are many variables that are involved in patient preparation. Your patience is greatly appreciated as you wait for your nurse. Please bring in items such as: books, magazines, newspapers, electronics, or any other items  to occupy your time in the waiting area. [x]  Delays may occur with surgery and staff will make a sincere effort to keep you informed of delays. If any delays occur with your procedure, we apologize ahead of time for your inconvenience as we recognize the value of your time.

## 2022-09-01 ENCOUNTER — HOSPITAL ENCOUNTER (OUTPATIENT)
Dept: PREADMISSION TESTING | Age: 87
Discharge: HOME | End: 2022-09-01
Payer: MEDICARE

## 2022-09-01 ENCOUNTER — HOSPITAL ENCOUNTER (OUTPATIENT)
Dept: GENERAL RADIOLOGY | Age: 87
Discharge: HOME | End: 2022-09-03
Payer: MEDICARE

## 2022-09-01 VITALS
OXYGEN SATURATION: 100 % | WEIGHT: 162.9 LBS | DIASTOLIC BLOOD PRESSURE: 72 MMHG | HEART RATE: 57 BPM | TEMPERATURE: 97.8 F | SYSTOLIC BLOOD PRESSURE: 120 MMHG | RESPIRATION RATE: 16 BRPM | BODY MASS INDEX: 23.37 KG/M2

## 2022-09-01 DIAGNOSIS — Z01.810 PREOP CARDIOVASCULAR EXAM: ICD-10-CM

## 2022-09-01 DIAGNOSIS — I71.9 PENETRATING ATHEROSCLEROTIC ULCER OF AORTA (HCC): ICD-10-CM

## 2022-09-01 DIAGNOSIS — Z01.812 PRE-OPERATIVE LABORATORY EXAMINATION: Primary | ICD-10-CM

## 2022-09-01 LAB
ABO/RH: NORMAL
ANION GAP SERPL CALCULATED.3IONS-SCNC: 9 MMOL/L (ref 7–16)
ANTIBODY SCREEN: NORMAL
BUN BLDV-MCNC: 17 MG/DL (ref 6–23)
CALCIUM SERPL-MCNC: 9.2 MG/DL (ref 8.6–10.2)
CHLORIDE BLD-SCNC: 110 MMOL/L (ref 98–107)
CO2: 23 MMOL/L (ref 22–29)
CREAT SERPL-MCNC: 1 MG/DL (ref 0.7–1.2)
GFR AFRICAN AMERICAN: >60
GFR NON-AFRICAN AMERICAN: >60 ML/MIN/1.73
GLUCOSE BLD-MCNC: 80 MG/DL (ref 74–99)
HCT VFR BLD CALC: 38.3 % (ref 37–54)
HEMOGLOBIN: 12.6 G/DL (ref 12.5–16.5)
INR BLD: 1.1
MCH RBC QN AUTO: 31 PG (ref 26–35)
MCHC RBC AUTO-ENTMCNC: 32.9 % (ref 32–34.5)
MCV RBC AUTO: 94.3 FL (ref 80–99.9)
PDW BLD-RTO: 13.6 FL (ref 11.5–15)
PLATELET # BLD: 173 E9/L (ref 130–450)
PMV BLD AUTO: 10.7 FL (ref 7–12)
POTASSIUM REFLEX MAGNESIUM: 4.2 MMOL/L (ref 3.5–5)
PROTHROMBIN TIME: 12.4 SEC (ref 9.3–12.4)
RBC # BLD: 4.06 E12/L (ref 3.8–5.8)
SODIUM BLD-SCNC: 142 MMOL/L (ref 132–146)
WBC # BLD: 9.8 E9/L (ref 4.5–11.5)

## 2022-09-01 PROCEDURE — 86923 COMPATIBILITY TEST ELECTRIC: CPT

## 2022-09-01 PROCEDURE — 86901 BLOOD TYPING SEROLOGIC RH(D): CPT

## 2022-09-01 PROCEDURE — 86900 BLOOD TYPING SEROLOGIC ABO: CPT

## 2022-09-01 PROCEDURE — 36415 COLL VENOUS BLD VENIPUNCTURE: CPT

## 2022-09-01 PROCEDURE — 93005 ELECTROCARDIOGRAM TRACING: CPT | Performed by: NURSE PRACTITIONER

## 2022-09-01 PROCEDURE — 85610 PROTHROMBIN TIME: CPT

## 2022-09-01 PROCEDURE — 86850 RBC ANTIBODY SCREEN: CPT

## 2022-09-01 PROCEDURE — 87081 CULTURE SCREEN ONLY: CPT

## 2022-09-01 PROCEDURE — 71046 X-RAY EXAM CHEST 2 VIEWS: CPT

## 2022-09-01 PROCEDURE — 80048 BASIC METABOLIC PNL TOTAL CA: CPT

## 2022-09-01 PROCEDURE — 85027 COMPLETE CBC AUTOMATED: CPT

## 2022-09-02 LAB
EKG ATRIAL RATE: 53 BPM
EKG P AXIS: 37 DEGREES
EKG P-R INTERVAL: 260 MS
EKG Q-T INTERVAL: 456 MS
EKG QRS DURATION: 94 MS
EKG QTC CALCULATION (BAZETT): 427 MS
EKG R AXIS: -32 DEGREES
EKG T AXIS: 24 DEGREES
EKG VENTRICULAR RATE: 53 BPM
MRSA CULTURE ONLY: NORMAL

## 2022-09-06 NOTE — CONSENT
Informed Consent for Blood Component Transfusion Note    I have discussed with the patient the rationale for blood component transfusion; its benefits in treating or preventing fatigue, organ damage, or death; and its risk which includes mild transfusion reactions, rare risk of blood borne infection, or more serious but rare reactions. I have discussed the alternatives to transfusion, including the risk and consequences of not receiving transfusion. The patient had an opportunity to ask questions and had agreed to proceed with transfusion of blood components.     Electronically signed by Alexandr Up MD on 9/6/22 at 7:50 AM EDT

## 2022-09-07 ENCOUNTER — ANESTHESIA EVENT (OUTPATIENT)
Dept: OPERATING ROOM | Age: 87
DRG: 221 | End: 2022-09-07
Payer: MEDICARE

## 2022-09-08 ENCOUNTER — ANESTHESIA (OUTPATIENT)
Dept: OPERATING ROOM | Age: 87
DRG: 221 | End: 2022-09-08
Payer: MEDICARE

## 2022-09-08 ENCOUNTER — HOSPITAL ENCOUNTER (INPATIENT)
Age: 87
LOS: 1 days | Discharge: HOME HEALTH | DRG: 221 | End: 2022-09-09
Attending: SURGERY | Admitting: SURGERY
Payer: MEDICARE

## 2022-09-08 DIAGNOSIS — I71.03 DISSECTION OF THORACOABDOMINAL AORTA (HCC): ICD-10-CM

## 2022-09-08 DIAGNOSIS — Z01.810 PREOP CARDIOVASCULAR EXAM: Primary | ICD-10-CM

## 2022-09-08 DIAGNOSIS — I71.9 PENETRATING ATHEROSCLEROTIC ULCER OF AORTA (HCC): ICD-10-CM

## 2022-09-08 PROBLEM — I71.20 THORACIC AORTIC ANEURYSM (HCC): Status: ACTIVE | Noted: 2022-09-08

## 2022-09-08 LAB
ANION GAP SERPL CALCULATED.3IONS-SCNC: 10 MMOL/L (ref 7–16)
BUN BLDV-MCNC: 17 MG/DL (ref 6–23)
CALCIUM SERPL-MCNC: 8.6 MG/DL (ref 8.6–10.2)
CHLORIDE BLD-SCNC: 109 MMOL/L (ref 98–107)
CO2: 21 MMOL/L (ref 22–29)
CREAT SERPL-MCNC: 1 MG/DL (ref 0.7–1.2)
GFR AFRICAN AMERICAN: >60
GFR NON-AFRICAN AMERICAN: >60 ML/MIN/1.73
GLUCOSE BLD-MCNC: 103 MG/DL (ref 74–99)
HCT VFR BLD CALC: 36.1 % (ref 37–54)
HEMOGLOBIN: 11.7 G/DL (ref 12.5–16.5)
MCH RBC QN AUTO: 30.2 PG (ref 26–35)
MCHC RBC AUTO-ENTMCNC: 32.4 % (ref 32–34.5)
MCV RBC AUTO: 93.3 FL (ref 80–99.9)
PDW BLD-RTO: 13.3 FL (ref 11.5–15)
PLATELET # BLD: 168 E9/L (ref 130–450)
PMV BLD AUTO: 10.6 FL (ref 7–12)
POTASSIUM SERPL-SCNC: 4.2 MMOL/L (ref 3.5–5)
RBC # BLD: 3.87 E12/L (ref 3.8–5.8)
SODIUM BLD-SCNC: 140 MMOL/L (ref 132–146)
WBC # BLD: 9.3 E9/L (ref 4.5–11.5)

## 2022-09-08 PROCEDURE — 7100000001 HC PACU RECOVERY - ADDTL 15 MIN: Performed by: SURGERY

## 2022-09-08 PROCEDURE — 2580000003 HC RX 258: Performed by: NURSE PRACTITIONER

## 2022-09-08 PROCEDURE — 3700000001 HC ADD 15 MINUTES (ANESTHESIA): Performed by: SURGERY

## 2022-09-08 PROCEDURE — 6370000000 HC RX 637 (ALT 250 FOR IP): Performed by: NURSE PRACTITIONER

## 2022-09-08 PROCEDURE — 04QK0ZZ REPAIR RIGHT FEMORAL ARTERY, OPEN APPROACH: ICD-10-PCS | Performed by: SURGERY

## 2022-09-08 PROCEDURE — 7100000000 HC PACU RECOVERY - FIRST 15 MIN: Performed by: SURGERY

## 2022-09-08 PROCEDURE — 34713 PERQ ACCESS & CLSR FEM ART: CPT

## 2022-09-08 PROCEDURE — 85027 COMPLETE CBC AUTOMATED: CPT

## 2022-09-08 PROCEDURE — 6360000004 HC RX CONTRAST MEDICATION: Performed by: SURGERY

## 2022-09-08 PROCEDURE — 2580000003 HC RX 258: Performed by: SURGERY

## 2022-09-08 PROCEDURE — 85347 COAGULATION TIME ACTIVATED: CPT

## 2022-09-08 PROCEDURE — 80048 BASIC METABOLIC PNL TOTAL CA: CPT

## 2022-09-08 PROCEDURE — C1768 GRAFT, VASCULAR: HCPCS | Performed by: SURGERY

## 2022-09-08 PROCEDURE — 37799 UNLISTED PX VASCULAR SURGERY: CPT

## 2022-09-08 PROCEDURE — C1894 INTRO/SHEATH, NON-LASER: HCPCS | Performed by: SURGERY

## 2022-09-08 PROCEDURE — 33883 DELAYED PLMT PROX XTN PROSTH: CPT

## 2022-09-08 PROCEDURE — 2580000003 HC RX 258

## 2022-09-08 PROCEDURE — 34713 PERQ ACCESS & CLSR FEM ART: CPT | Performed by: SURGERY

## 2022-09-08 PROCEDURE — A4217 STERILE WATER/SALINE, 500 ML: HCPCS | Performed by: SURGERY

## 2022-09-08 PROCEDURE — 3600000005 HC SURGERY LEVEL 5 BASE: Performed by: SURGERY

## 2022-09-08 PROCEDURE — C1760 CLOSURE DEV, VASC: HCPCS | Performed by: SURGERY

## 2022-09-08 PROCEDURE — C1769 GUIDE WIRE: HCPCS | Performed by: SURGERY

## 2022-09-08 PROCEDURE — C2628 CATHETER, OCCLUSION: HCPCS | Performed by: SURGERY

## 2022-09-08 PROCEDURE — 33883 DELAYED PLMT PROX XTN PROSTH: CPT | Performed by: SURGERY

## 2022-09-08 PROCEDURE — 2709999900 HC NON-CHARGEABLE SUPPLY: Performed by: SURGERY

## 2022-09-08 PROCEDURE — 36415 COLL VENOUS BLD VENIPUNCTURE: CPT

## 2022-09-08 PROCEDURE — 02VW3DZ RESTRICTION OF THORACIC AORTA, DESCENDING WITH INTRALUMINAL DEVICE, PERCUTANEOUS APPROACH: ICD-10-PCS | Performed by: SURGERY

## 2022-09-08 PROCEDURE — 3700000000 HC ANESTHESIA ATTENDED CARE: Performed by: SURGERY

## 2022-09-08 PROCEDURE — 6360000002 HC RX W HCPCS

## 2022-09-08 PROCEDURE — 2000000000 HC ICU R&B

## 2022-09-08 PROCEDURE — 6360000002 HC RX W HCPCS: Performed by: NURSE PRACTITIONER

## 2022-09-08 PROCEDURE — 6370000000 HC RX 637 (ALT 250 FOR IP): Performed by: SURGERY

## 2022-09-08 PROCEDURE — 6360000002 HC RX W HCPCS: Performed by: SURGERY

## 2022-09-08 PROCEDURE — 2500000003 HC RX 250 WO HCPCS: Performed by: SURGERY

## 2022-09-08 PROCEDURE — 3600000015 HC SURGERY LEVEL 5 ADDTL 15MIN: Performed by: SURGERY

## 2022-09-08 DEVICE — IMPLANTABLE DEVICE: Type: IMPLANTABLE DEVICE | Site: GROIN | Status: FUNCTIONAL

## 2022-09-08 RX ORDER — SODIUM CHLORIDE 0.9 % (FLUSH) 0.9 %
5-40 SYRINGE (ML) INJECTION PRN
Status: DISCONTINUED | OUTPATIENT
Start: 2022-09-08 | End: 2022-09-08 | Stop reason: HOSPADM

## 2022-09-08 RX ORDER — SODIUM CHLORIDE 0.9 % (FLUSH) 0.9 %
5-40 SYRINGE (ML) INJECTION EVERY 12 HOURS SCHEDULED
Status: DISCONTINUED | OUTPATIENT
Start: 2022-09-08 | End: 2022-09-08 | Stop reason: HOSPADM

## 2022-09-08 RX ORDER — DEXAMETHASONE SODIUM PHOSPHATE 10 MG/ML
INJECTION INTRAMUSCULAR; INTRAVENOUS PRN
Status: DISCONTINUED | OUTPATIENT
Start: 2022-09-08 | End: 2022-09-08 | Stop reason: SDUPTHER

## 2022-09-08 RX ORDER — SODIUM CHLORIDE 9 MG/ML
INJECTION, SOLUTION INTRAVENOUS PRN
Status: DISCONTINUED | OUTPATIENT
Start: 2022-09-08 | End: 2022-09-09 | Stop reason: HOSPADM

## 2022-09-08 RX ORDER — SODIUM CHLORIDE 9 MG/ML
INJECTION, SOLUTION INTRAVENOUS PRN
Status: DISCONTINUED | OUTPATIENT
Start: 2022-09-08 | End: 2022-09-08 | Stop reason: HOSPADM

## 2022-09-08 RX ORDER — CHOLESTYRAMINE LIGHT 4 G/5.7G
4 POWDER, FOR SUSPENSION ORAL 2 TIMES DAILY
Status: DISCONTINUED | OUTPATIENT
Start: 2022-09-09 | End: 2022-09-09 | Stop reason: HOSPADM

## 2022-09-08 RX ORDER — LISINOPRIL 20 MG/1
20 TABLET ORAL DAILY
Status: DISCONTINUED | OUTPATIENT
Start: 2022-09-09 | End: 2022-09-09 | Stop reason: HOSPADM

## 2022-09-08 RX ORDER — PROTAMINE SULFATE 10 MG/ML
INJECTION, SOLUTION INTRAVENOUS PRN
Status: DISCONTINUED | OUTPATIENT
Start: 2022-09-08 | End: 2022-09-08 | Stop reason: SDUPTHER

## 2022-09-08 RX ORDER — ASPIRIN 81 MG/1
81 TABLET, CHEWABLE ORAL DAILY
Status: DISCONTINUED | OUTPATIENT
Start: 2022-09-09 | End: 2022-09-09 | Stop reason: HOSPADM

## 2022-09-08 RX ORDER — SODIUM CHLORIDE, SODIUM LACTATE, POTASSIUM CHLORIDE, CALCIUM CHLORIDE 600; 310; 30; 20 MG/100ML; MG/100ML; MG/100ML; MG/100ML
INJECTION, SOLUTION INTRAVENOUS CONTINUOUS
Status: DISCONTINUED | OUTPATIENT
Start: 2022-09-08 | End: 2022-09-09

## 2022-09-08 RX ORDER — PROPOFOL 10 MG/ML
INJECTION, EMULSION INTRAVENOUS CONTINUOUS PRN
Status: DISCONTINUED | OUTPATIENT
Start: 2022-09-08 | End: 2022-09-08 | Stop reason: SDUPTHER

## 2022-09-08 RX ORDER — MORPHINE SULFATE 2 MG/ML
2 INJECTION, SOLUTION INTRAMUSCULAR; INTRAVENOUS EVERY 5 MIN PRN
Status: DISCONTINUED | OUTPATIENT
Start: 2022-09-08 | End: 2022-09-08 | Stop reason: SDUPTHER

## 2022-09-08 RX ORDER — FENTANYL CITRATE 50 UG/ML
INJECTION, SOLUTION INTRAMUSCULAR; INTRAVENOUS PRN
Status: DISCONTINUED | OUTPATIENT
Start: 2022-09-08 | End: 2022-09-08 | Stop reason: SDUPTHER

## 2022-09-08 RX ORDER — MIDAZOLAM HYDROCHLORIDE 1 MG/ML
INJECTION INTRAMUSCULAR; INTRAVENOUS PRN
Status: DISCONTINUED | OUTPATIENT
Start: 2022-09-08 | End: 2022-09-08 | Stop reason: SDUPTHER

## 2022-09-08 RX ORDER — ROSUVASTATIN CALCIUM 10 MG/1
10 TABLET, COATED ORAL NIGHTLY
Status: DISCONTINUED | OUTPATIENT
Start: 2022-09-08 | End: 2022-09-09 | Stop reason: HOSPADM

## 2022-09-08 RX ORDER — PANTOPRAZOLE SODIUM 40 MG/1
40 TABLET, DELAYED RELEASE ORAL DAILY
Status: DISCONTINUED | OUTPATIENT
Start: 2022-09-09 | End: 2022-09-09 | Stop reason: HOSPADM

## 2022-09-08 RX ORDER — SODIUM CHLORIDE 9 MG/ML
INJECTION, SOLUTION INTRAVENOUS CONTINUOUS PRN
Status: DISCONTINUED | OUTPATIENT
Start: 2022-09-08 | End: 2022-09-08 | Stop reason: SDUPTHER

## 2022-09-08 RX ORDER — AMLODIPINE BESYLATE 5 MG/1
10 TABLET ORAL DAILY
Status: DISCONTINUED | OUTPATIENT
Start: 2022-09-09 | End: 2022-09-09 | Stop reason: HOSPADM

## 2022-09-08 RX ORDER — MEPERIDINE HYDROCHLORIDE 25 MG/ML
12.5 INJECTION INTRAMUSCULAR; INTRAVENOUS; SUBCUTANEOUS EVERY 5 MIN PRN
Status: DISCONTINUED | OUTPATIENT
Start: 2022-09-08 | End: 2022-09-08 | Stop reason: HOSPADM

## 2022-09-08 RX ORDER — SODIUM CHLORIDE, SODIUM LACTATE, POTASSIUM CHLORIDE, CALCIUM CHLORIDE 600; 310; 30; 20 MG/100ML; MG/100ML; MG/100ML; MG/100ML
INJECTION, SOLUTION INTRAVENOUS CONTINUOUS PRN
Status: DISCONTINUED | OUTPATIENT
Start: 2022-09-08 | End: 2022-09-08 | Stop reason: SDUPTHER

## 2022-09-08 RX ORDER — FUROSEMIDE 20 MG/1
20 TABLET ORAL DAILY
Status: DISCONTINUED | OUTPATIENT
Start: 2022-09-09 | End: 2022-09-09 | Stop reason: HOSPADM

## 2022-09-08 RX ORDER — ONDANSETRON 2 MG/ML
4 INJECTION INTRAMUSCULAR; INTRAVENOUS EVERY 6 HOURS PRN
Status: DISCONTINUED | OUTPATIENT
Start: 2022-09-08 | End: 2022-09-09 | Stop reason: HOSPADM

## 2022-09-08 RX ORDER — SODIUM CHLORIDE 0.9 % (FLUSH) 0.9 %
5-40 SYRINGE (ML) INJECTION PRN
Status: DISCONTINUED | OUTPATIENT
Start: 2022-09-08 | End: 2022-09-09 | Stop reason: HOSPADM

## 2022-09-08 RX ORDER — PAROXETINE HYDROCHLORIDE 20 MG/1
20 TABLET, FILM COATED ORAL DAILY
Status: DISCONTINUED | OUTPATIENT
Start: 2022-09-09 | End: 2022-09-09 | Stop reason: HOSPADM

## 2022-09-08 RX ORDER — ONDANSETRON 2 MG/ML
INJECTION INTRAMUSCULAR; INTRAVENOUS PRN
Status: DISCONTINUED | OUTPATIENT
Start: 2022-09-08 | End: 2022-09-08 | Stop reason: SDUPTHER

## 2022-09-08 RX ORDER — MORPHINE SULFATE 2 MG/ML
1 INJECTION, SOLUTION INTRAMUSCULAR; INTRAVENOUS EVERY 5 MIN PRN
Status: DISCONTINUED | OUTPATIENT
Start: 2022-09-08 | End: 2022-09-08 | Stop reason: SDUPTHER

## 2022-09-08 RX ORDER — ENOXAPARIN SODIUM 100 MG/ML
40 INJECTION SUBCUTANEOUS DAILY
Status: DISCONTINUED | OUTPATIENT
Start: 2022-09-09 | End: 2022-09-09 | Stop reason: HOSPADM

## 2022-09-08 RX ORDER — SODIUM CHLORIDE 0.9 % (FLUSH) 0.9 %
5-40 SYRINGE (ML) INJECTION EVERY 12 HOURS SCHEDULED
Status: DISCONTINUED | OUTPATIENT
Start: 2022-09-08 | End: 2022-09-09 | Stop reason: HOSPADM

## 2022-09-08 RX ORDER — MIRTAZAPINE 15 MG/1
15 TABLET, FILM COATED ORAL NIGHTLY
Status: DISCONTINUED | OUTPATIENT
Start: 2022-09-08 | End: 2022-09-09 | Stop reason: HOSPADM

## 2022-09-08 RX ORDER — HEPARIN SODIUM 1000 [USP'U]/ML
INJECTION, SOLUTION INTRAVENOUS; SUBCUTANEOUS PRN
Status: DISCONTINUED | OUTPATIENT
Start: 2022-09-08 | End: 2022-09-08 | Stop reason: SDUPTHER

## 2022-09-08 RX ADMIN — PHENYLEPHRINE HYDROCHLORIDE 25 MCG/MIN: 10 INJECTION, SOLUTION INTRAMUSCULAR; INTRAVENOUS; SUBCUTANEOUS at 08:47

## 2022-09-08 RX ADMIN — SODIUM CHLORIDE: 9 INJECTION, SOLUTION INTRAVENOUS at 06:40

## 2022-09-08 RX ADMIN — SODIUM CHLORIDE: 9 INJECTION, SOLUTION INTRAVENOUS at 08:05

## 2022-09-08 RX ADMIN — METOPROLOL TARTRATE 50 MG: 25 TABLET, FILM COATED ORAL at 21:15

## 2022-09-08 RX ADMIN — FENTANYL CITRATE 50 MCG: 50 INJECTION, SOLUTION INTRAMUSCULAR; INTRAVENOUS at 07:38

## 2022-09-08 RX ADMIN — MIDAZOLAM 1 MG: 1 INJECTION INTRAMUSCULAR; INTRAVENOUS at 07:38

## 2022-09-08 RX ADMIN — SODIUM CHLORIDE, POTASSIUM CHLORIDE, SODIUM LACTATE AND CALCIUM CHLORIDE: 600; 310; 30; 20 INJECTION, SOLUTION INTRAVENOUS at 15:19

## 2022-09-08 RX ADMIN — PROTAMINE SULFATE 50 MG: 10 INJECTION, SOLUTION INTRAVENOUS at 09:24

## 2022-09-08 RX ADMIN — PROTAMINE SULFATE 10 MG: 10 INJECTION, SOLUTION INTRAVENOUS at 09:38

## 2022-09-08 RX ADMIN — PROPOFOL 75 MCG/KG/MIN: 10 INJECTION, EMULSION INTRAVENOUS at 07:38

## 2022-09-08 RX ADMIN — CEFAZOLIN 2000 MG: 2 INJECTION, POWDER, FOR SOLUTION INTRAMUSCULAR; INTRAVENOUS at 07:59

## 2022-09-08 RX ADMIN — SODIUM CHLORIDE, POTASSIUM CHLORIDE, SODIUM LACTATE AND CALCIUM CHLORIDE: 600; 310; 30; 20 INJECTION, SOLUTION INTRAVENOUS at 08:05

## 2022-09-08 RX ADMIN — ONDANSETRON HYDROCHLORIDE 4 MG: 2 SOLUTION INTRAMUSCULAR; INTRAVENOUS at 07:38

## 2022-09-08 RX ADMIN — HEPARIN SODIUM 10000 UNITS: 1000 INJECTION INTRAVENOUS; SUBCUTANEOUS at 08:39

## 2022-09-08 RX ADMIN — SODIUM CHLORIDE: 9 INJECTION, SOLUTION INTRAVENOUS at 07:30

## 2022-09-08 RX ADMIN — MIRTAZAPINE 15 MG: 15 TABLET, FILM COATED ORAL at 22:24

## 2022-09-08 RX ADMIN — DEXAMETHASONE SODIUM PHOSPHATE 10 MG: 10 INJECTION INTRAMUSCULAR; INTRAVENOUS at 07:59

## 2022-09-08 RX ADMIN — WATER 2000 MG: 1 INJECTION INTRAMUSCULAR; INTRAVENOUS; SUBCUTANEOUS at 18:53

## 2022-09-08 RX ADMIN — ROSUVASTATIN 10 MG: 10 TABLET, FILM COATED ORAL at 21:15

## 2022-09-08 RX ADMIN — MIDAZOLAM 1 MG: 1 INJECTION INTRAMUSCULAR; INTRAVENOUS at 07:30

## 2022-09-08 RX ADMIN — SODIUM CHLORIDE, PRESERVATIVE FREE 10 ML: 5 INJECTION INTRAVENOUS at 21:18

## 2022-09-08 ASSESSMENT — PAIN - FUNCTIONAL ASSESSMENT: PAIN_FUNCTIONAL_ASSESSMENT: 0-10

## 2022-09-08 NOTE — H&P
Vascular Surgery History & Physical Exam    Chief Complaint: Hx AAA    HISTORY OF PRESENT ILLNESS:    The patient is a 80 y.o. male who presents to the hospital for elective repair of an assx Assx DAIJA of descending thoracic aorta  Past Medical History:   Diagnosis Date    Acute basilic vein thrombosis, right 5/3/2022    Acute pancreatitis      AND     Anxiety     BPH (benign prostatic hyperplasia)     CAD (coronary artery disease)     GERD (gastroesophageal reflux disease)     Hyperlipidemia     Obsessive compulsive disorder     Psoriasis      Past Surgical History:   Procedure Laterality Date    CATARACT REMOVAL Bilateral 2017    COLONOSCOPY  2017    THROAT SURGERY      UPPER GASTROINTESTINAL ENDOSCOPY  2017     Current Medications:     Current Facility-Administered Medications:     sodium chloride flush 0.9 % injection 5-40 mL, 5-40 mL, IntraVENous, 2 times per day, Beto Mane APRN - CNP    sodium chloride flush 0.9 % injection 5-40 mL, 5-40 mL, IntraVENous, PRN, Beto Mane APRN - CNP    0.9 % sodium chloride infusion, , IntraVENous, PRN, Beto Mane APRN - CNP, Last Rate: 75 mL/hr at 22 0640, New Bag at 22 0640    ceFAZolin (ANCEF) 2,000 mg in sterile water 20 mL IV syringe, 2,000 mg, IntraVENous, On Call to Beto Gates APRN - CNP  Allergies:  Patient has no known allergies.   Social History     Socioeconomic History    Marital status:      Spouse name: Not on file    Number of children: Not on file    Years of education: Not on file    Highest education level: Not on file   Occupational History    Not on file   Tobacco Use    Smoking status: Former     Types: Cigarettes     Quit date: 1969     Years since quittin.7    Smokeless tobacco: Never   Substance and Sexual Activity    Alcohol use: Yes     Comment: OCCASIONAL BEER    Drug use: No    Sexual activity: Not on file   Other Topics Concern    Not on file   Social History Narrative    Not on file     Social Determinants of Health     Financial Resource Strain: Low Risk     Difficulty of Paying Living Expenses: Not hard at all   Food Insecurity: No Food Insecurity    Worried About Running Out of Food in the Last Year: Never true    Ran Out of Food in the Last Year: Never true   Transportation Needs: Not on file   Physical Activity: Not on file   Stress: Not on file   Social Connections: Not on file   Intimate Partner Violence: Not on file   Housing Stability: Not on file     Family History   Problem Relation Age of Onset    Asthma Mother     Coronary Art Dis Mother     Parkinsonism Father        ROS : All others Negative if blank [], Positive if [x]  General   [] Fevers   [] Chills   [] Weight Loss   Skin   [] Tissue Loss   Eyes   [] Wears Glasses/Contacts   [] Vision Changes   Respiratory    [] Shortness of breath   Cardiovascular   [] Chest Pain   [] Shortness of breath with exertion   Gastrointestinal   [] Abdominal Pain      PHYSICAL EXAM:    Vitals:    09/08/22 0608   BP: 137/65   Pulse: 64   Resp: 18   Temp: 98 °F (36.7 °C)   SpO2: 94%     CONSTITUTIONAL:  awake, alert, cooperative, no apparent distress, and appears stated age  NECK:  supple, symmetrical, trachea midline  LUNGS:  no increased work of breathing, good resp excursion  CARDIOVASCULAR:  S1S2  ABDOMEN:  soft, non-distended and non-tenderl   R LE  L LE  LABS:    Lab Results   Component Value Date    WBC 9.8 09/01/2022    HGB 12.6 09/01/2022    HCT 38.3 09/01/2022     09/01/2022    PROTIME 12.4 09/01/2022    INR 1.1 09/01/2022    APTT 27.5 05/01/2022    K 4.2 09/01/2022    BUN 17 09/01/2022    CREATININE 1.0 09/01/2022       Assesment:  Assx DAIJA of descending thoracic aorta  PLAN:   I reviewed the procedure with the patient and family as available. I discussed the procedure, risks, benefits, complications, and alternatives of the procedure. They understand and consent.   All questions were answered  plan for MCIHELLE Talavera S Kole Schofield MD

## 2022-09-08 NOTE — PROGRESS NOTES
Patient's niece Shawna Campbell and nephew Anastacio Bruce made aware of patient's transfer to [facility] when the bed/room is cleaned. Questions answered. [facility] nurse's station phone number provided.

## 2022-09-08 NOTE — OP NOTE
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 Item Serial No.  Lot No. LRB No. Used Action   GRAFT STENT TAG CONFORM THORACIC SG AC [id number] 22FR - [id number] Vascular grafts GRAFT STENT TAG CONFORM THORACIC SG AC [id number] 22FR [id number]  [employer] [facility]-  N/A 1 Implanted     DESCRIPTION OF PROCEDURE: The patient was identified and the procedure was confirmed. The abdomen, groins and thighs were prepped and draped in the usual sterile fashion. The right common femoral artery was identified under US, noted to be patent and percutaneously accessed with micropuncture needle. Image documented. Wire and than 6 fr sheath placed. Proglides placed at 12,10 and 2 oclock position and predeployed. Rubber shods to sutures. 8 fr sheath replaced. The patient was heparinized maintaining an activated clotting time greater than 300 seconds. The CTAG (34x10) was inserted through the 22 fr sheath over a lunderquist wire through the right femoral artery and into the abdominal aorta. An arteriogram was obtained to identify the position of the DAIJA, celiac artery and the stent was deployed.       A completion aortogram was obtained, which identified a widely patent stent

## 2022-09-08 NOTE — ANESTHESIA PROCEDURE NOTES
Arterial Line:    An arterial line was placed using surface landmarks, in the OR for the following indication(s): continuous blood pressure monitoring and blood sampling needed. A 20 gauge (size), 1 and 3/8 inch (length), Arrow (type) catheter was placed, Seldinger technique not used, into the radial artery, secured by tape and Tegaderm. Anesthesia type: Local  Local infiltration: Injection    Events:  patient tolerated procedure well with no complications.   Resident/CRNA: ORVILLE Lozano - DOTTY  Other anesthesia staff: Pelon Saavedra RN  Performed: Resident/CRNA   Preanesthetic Checklist  Completed: patient identified, IV checked, site marked, risks and benefits discussed, surgical/procedural consents, equipment checked, pre-op evaluation, timeout performed, anesthesia consent given, oxygen available and monitors applied/VS acknowledged

## 2022-09-08 NOTE — ANESTHESIA PRE PROCEDURE
Department of Anesthesiology  Preprocedure Note       Name:  Cecilio Taylor   Age:  80 y.o.  :  1935                                          MRN:  [id number]         Date:  2022      Surgeon: Mera Mantilla):  Keira Duffy MD    Procedure: Procedure(s):  THORACIC AORTIC ANEURYSM REPAIR ENDOVASCULAR    Medications prior to admission:   Prior to Admission medications    Medication Sig Start Date End Date Taking? Authorizing Provider   furosemide (LASIX) 20 MG tablet Take 1 tablet by mouth in the morning. 22   Jesus Kenney DO   mupirocin (BACTROBAN) 2 % ointment Apply topically 3 times daily. Patient taking differently: 3 times daily as needed Apply topically 3 times daily.  22   PRATIBHA Tidwell   colestipol (COLESTID) 1 g tablet TAKE 1 TAB BY MOUTH 2 TIMES A DAY AS NEEDED FOR LOOSE STOOLS 1 HOUR PRIOR OR 4 HOURS AFTER OTHER MED 22   Historical Provider, MD   amLODIPine (NORVASC) 10 MG tablet Take 1 tablet by mouth daily 22  Jesus Kenney DO   colesevelam (WELCHOL) 625 MG tablet Take 1 tablet by mouth 2 times daily (with meals) 22   German Pradhan DO   ferrous sulfate (IRON 325) 325 (65 Fe) MG tablet Take 1 tablet by mouth daily (with breakfast) 22   Jesus Kenney DO   PARoxetine (PAXIL) 20 MG tablet ONE DAILY 22   German Pradhan DO   metoprolol (LOPRESSOR) 100 MG tablet Take 1 tablet by mouth 3 times daily  Patient taking differently: Take 50 mg by mouth nightly 22   Jesus Kenney DO   lisinopril (PRINIVIL;ZESTRIL) 20 MG tablet Take 1 tablet by mouth daily 22   Jesus Kenney DO   rosuvastatin (CRESTOR) 10 MG tablet TAKE 1 TABLET BY MOUTH AT  NIGHT 22   Jesus Kenney DO   Probiotic Product (VSL#3) CAPS TAKE 2 CAPSULES BY MOUTH EVERY DAY 3/30/22   Historical Provider, MD   mirtazapine (REMERON) 15 MG tablet Take 1 tablet by mouth nightly 5/10/22   Jesus Kenney DO   esomeprazole Magnesium (NEXIUM) 40 MG PACK Take 40 mg by mouth daily    Historical Provider, MD   aspirin 81 MG chewable tablet Take 1 tablet by mouth daily 5/3/22   Melvi Waldron MD   Bismuth Subsalicylate (PEPTO-BISMOL PO) Take by mouth as needed    Historical Provider, MD   Multiple Vitamins-Minerals (VITEYES AREDS FORMULA/LUTEIN PO) Take by mouth    Historical Provider, MD       Current medications:    Current Facility-Administered Medications   Medication Dose Route Frequency Provider Last Rate Last Admin    sodium chloride flush 0.9 % injection 5-40 mL  5-40 mL IntraVENous 2 times per day Gennett Alhaji, APRN - CNP        sodium chloride flush 0.9 % injection 5-40 mL  5-40 mL IntraVENous PRN Gennett Alhaji, APRN - CNP        0.9 % sodium chloride infusion   IntraVENous PRN Gennett Alhaji, APRN - CNP 75 mL/hr at 09/08/22 0640 New Bag at 09/08/22 0640    ceFAZolin (ANCEF) 2,000 mg in sterile water 20 mL IV syringe  2,000 mg IntraVENous On Call to [address] 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    Thoracic aortic aneurysm ([address] I71.2       Past Medical History:        Diagnosis Date    Acute basilic vein thrombosis, right 5/3/2022    Acute pancreatitis     6/06 AND 11/09    Anxiety     BPH (benign prostatic hyperplasia)     CAD (coronary artery disease)     GERD (gastroesophageal reflux disease)     Hyperlipidemia     Obsessive compulsive disorder     Psoriasis        Past Surgical History: Procedure Laterality Date    CATARACT REMOVAL Bilateral 2017    COLONOSCOPY  2017    THROAT SURGERY      UPPER GASTROINTESTINAL ENDOSCOPY  2017       Social History:    Social History     Tobacco Use    Smoking status: Former     Types: Cigarettes     Quit date: 1969     Years since quittin.7    Smokeless tobacco: Never   Substance Use Topics    Alcohol use: Yes     Comment: OCCASIONAL BEER                                Counseling given: Not Answered      Vital Signs (Current):   Vitals:    22 0608   BP: 137/65   Pulse: 64   Resp: 18   Temp: 36.7 °C (98 °F)   TempSrc: Temporal   SpO2: 94%   Weight: 162 lb 14.4 oz (73.9 kg)   Height: 5' 10\" (1.778 m)                                              BP Readings from Last 3 Encounters:   22 137/65   22 120/72   22 102/64       NPO Status: Time of last liquid consumption: 410                        Time of last solid consumption:                         Date of last liquid consumption: 22                        Date of last solid food consumption: 22    BMI:   Wt Readings from Last 3 Encounters:   22 162 lb 14.4 oz (73.9 kg)   22 162 lb 14.4 oz (73.9 kg)   22 160 lb (72.6 kg)     Body mass index is 23.37 kg/m².     CBC:   Lab Results   Component Value Date/Time    WBC 9.8 2022 12:00 PM    RBC 4.06 2022 12:00 PM    HGB 12.6 2022 12:00 PM    HCT 38.3 2022 12:00 PM    MCV 94.3 2022 12:00 PM    RDW 13.6 2022 12:00 PM     2022 12:00 PM       CMP:   Lab Results   Component Value Date/Time     2022 12:00 PM    K 4.2 2022 12:00 PM     2022 12:00 PM    CO2 23 2022 12:00 PM    BUN 17 2022 12:00 PM    CREATININE 1.0 2022 12:00 PM    GFRAA >60 2022 12:00 PM    LABGLOM >60 2022 12:00 PM    GLUCOSE 80 2022 12:00 PM    GLUCOSE 92 2011 09:30 AM    PROT 7.1 2022 04:08 AM CALCIUM 9.2 09/01/2022 12:00 PM    BILITOT 0.6 05/01/2022 04:08 AM    ALKPHOS 91 05/01/2022 04:08 AM    AST 16 05/01/2022 04:08 AM    ALT 16 05/01/2022 04:08 AM       POC Tests: No results for input(s): POCGLU, POCNA, POCK, POCCL, POCBUN, POCHEMO, POCHCT in the last 72 hours. Coags:   Lab Results   Component Value Date/Time    PROTIME 12.4 09/01/2022 12:00 PM    INR 1.1 09/01/2022 12:00 PM    APTT 27.5 05/01/2022 04:08 AM       HCG (If Applicable): No results found for: PREGTESTUR, PREGSERUM, HCG, HCGQUANT     ABGs: No results found for: PHART, PO2ART, JDV5DSS, CKL3NIJ, BEART, W7GOKCVW     Type & Screen (If Applicable):  No results found for: LABABO, LABRH    Drug/Infectious Status (If Applicable):  No results found for: HIV, HEPCAB    COVID-19 Screening (If Applicable):   Lab Results   Component Value Date/Time    COVID19 Not Detected 04/28/2022 12:00 PM     ECG- 9/1/2022- \"Sinus bradycardia with 1st degree AV block  Left axis deviation  Inferior infarct (cited on or before 01-MAY-2022)  Abnormal ECG  When compared with ECG of 01-MAY-2022 14:29,  Significant changes have occurred\"        Anesthesia Evaluation  Patient summary reviewed no history of anesthetic complications:   Airway: Mallampati: I  TM distance: <3 FB   Neck ROM: full  Mouth opening: < 3 FB   Dental: normal exam         Pulmonary:Negative Pulmonary ROS and normal exam                               Cardiovascular:    (+) hypertension:, CAD:, hyperlipidemia      ECG reviewed  Rhythm: regular  Rate: normal  Echocardiogram reviewed                  Neuro/Psych:   (+) psychiatric history ( OCD):            GI/Hepatic/Renal:   (+) hiatal hernia, GERD:,           Endo/Other:                     Abdominal:             Vascular: negative vascular ROS.  + DVT, . Other Findings:           Anesthesia Plan      general and MAC     ASA 3       Induction: intravenous. arterial line  MIPS: Prophylactic antiemetics administered.   Anesthetic plan and risks discussed with patient. Use of blood products discussed with patient whom consented to blood products. Plan discussed with CRNA and attending. Gabo Olguin RN   9/8/2022      Pt seen, examined, chart reviewed, plan discussed.   Josefina Saravia MD  9/8/2022  7:24 AM

## 2022-09-08 NOTE — ANESTHESIA POSTPROCEDURE EVALUATION
Department of Anesthesiology  Postprocedure Note    Patient: Bella Whittaker  MRN: [id number]  YOB: 1935  Date of evaluation: 9/8/2022      Procedure Summary     Date: 09/08/22 Room / Location: [place] [address] / [facility]    Anesthesia Start:  Anesthesia Stop:     Procedure: THORACIC AORTIC ANEURYSM REPAIR ENDOVASCULAR Diagnosis:       Dissection of aorta, thoracoabdominal ([facility]      ([address])    Surgeons: Helen Baires MD Responsible Provider:     Anesthesia Type: general, MAC ASA Status: 3          Anesthesia Type: MAC    Sumanth Phase I: Sumanth Score: 8    Sumanth Phase II:        Anesthesia Post Evaluation    Patient location during evaluation: ICU  Patient participation: complete - patient participated  Level of consciousness: awake  Pain score: 3  Airway patency: patent  Nausea & Vomiting: no nausea and no vomiting  Complications: no  Cardiovascular status: blood pressure returned to baseline  Respiratory status: acceptable  Hydration status: euvolemic

## 2022-09-08 NOTE — PROGRESS NOTES
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 Hemodynamically stable. Drowsy, following commands   Eyes: PERRL  Pulmonary:  No wheezes, no accessory muscle use noted   Cardiovascular: RRR, no heaves or thrills palpated   Telemetry: SR   Abdomen: Soft, nondistended  Extremities: BLE 2+ DP, no edema   Neurologic/Psych: Drowsy, MAEx4 to command   Skin: Warm and dry    Incision: Right groin incision well approximated      Assessment/Plan: Day of Surgery     1.  S/p TEVAR   - Frequent neurovascular checks, monitor incision for signs of swelling/hematoma   - NPO, IVF @125cc/hr  - Chappell catheter to GD  - Bedrest  - Perioperative Ancef  - PRN dilaudid for pain management   - Maintain SBP <160, start Nicardipine gtt if needed       Electronically signed by ORVILLE Kwon - CNP on 9/8/2022 at 10:34 AM

## 2022-09-09 VITALS
HEART RATE: 55 BPM | BODY MASS INDEX: 23.32 KG/M2 | WEIGHT: 162.9 LBS | SYSTOLIC BLOOD PRESSURE: 102 MMHG | RESPIRATION RATE: 18 BRPM | TEMPERATURE: 98.1 F | DIASTOLIC BLOOD PRESSURE: 55 MMHG | HEIGHT: 70 IN | OXYGEN SATURATION: 96 %

## 2022-09-09 LAB
POC ACT LR: 147 SECONDS
POC ACT LR: 159 SECONDS
POC ACT LR: 284 SECONDS
POC ACT LR: 309 SECONDS

## 2022-09-09 PROCEDURE — 6360000002 HC RX W HCPCS: Performed by: NURSE PRACTITIONER

## 2022-09-09 PROCEDURE — 2580000003 HC RX 258: Performed by: NURSE PRACTITIONER

## 2022-09-09 PROCEDURE — 37799 UNLISTED PX VASCULAR SURGERY: CPT

## 2022-09-09 PROCEDURE — 6370000000 HC RX 637 (ALT 250 FOR IP): Performed by: NURSE PRACTITIONER

## 2022-09-09 RX ORDER — SENNA AND DOCUSATE SODIUM 50; 8.6 MG/1; MG/1
1 TABLET, FILM COATED ORAL DAILY
Qty: 3 TABLET | Refills: 0 | Status: SHIPPED | OUTPATIENT
Start: 2022-09-09 | End: 2022-09-12

## 2022-09-09 RX ORDER — HYDROCODONE BITARTRATE AND ACETAMINOPHEN 5; 325 MG/1; MG/1
1 TABLET ORAL EVERY 6 HOURS PRN
Qty: 28 TABLET | Refills: 0 | Status: SHIPPED | OUTPATIENT
Start: 2022-09-09 | End: 2022-09-16

## 2022-09-09 RX ORDER — OXYCODONE HYDROCHLORIDE AND ACETAMINOPHEN 5; 325 MG/1; MG/1
1 TABLET ORAL EVERY 6 HOURS PRN
Qty: 8 TABLET | Refills: 0 | Status: CANCELLED | OUTPATIENT
Start: 2022-09-09 | End: 2022-09-12

## 2022-09-09 RX ADMIN — ENOXAPARIN SODIUM 40 MG: 100 INJECTION SUBCUTANEOUS at 08:26

## 2022-09-09 RX ADMIN — CHOLESTYRAMINE 4 G: 4 POWDER, FOR SUSPENSION ORAL at 08:28

## 2022-09-09 RX ADMIN — LISINOPRIL 20 MG: 20 TABLET ORAL at 08:27

## 2022-09-09 RX ADMIN — FUROSEMIDE 20 MG: 20 TABLET ORAL at 08:27

## 2022-09-09 RX ADMIN — PANTOPRAZOLE SODIUM 40 MG: 40 TABLET, DELAYED RELEASE ORAL at 08:36

## 2022-09-09 RX ADMIN — WATER 2000 MG: 1 INJECTION INTRAMUSCULAR; INTRAVENOUS; SUBCUTANEOUS at 01:55

## 2022-09-09 RX ADMIN — ASPIRIN 81 MG CHEWABLE TABLET 81 MG: 81 TABLET CHEWABLE at 08:27

## 2022-09-09 RX ADMIN — SODIUM CHLORIDE, PRESERVATIVE FREE 10 ML: 5 INJECTION INTRAVENOUS at 08:28

## 2022-09-09 RX ADMIN — PAROXETINE 20 MG: 20 TABLET, FILM COATED ORAL at 08:27

## 2022-09-09 RX ADMIN — AMLODIPINE BESYLATE 10 MG: 5 TABLET ORAL at 08:27

## 2022-09-09 NOTE — DISCHARGE INSTRUCTIONS
Discharge Instructions for Endovascular Repair of Aortic Aneurysm         Call Dr. Garcia Cords office [phone] for follow-up appointment. During endovascular repair, the doctor guides catheters through both groin arteries up to the aortic aneurysm in the abdomen. A stent graft is placed in the weakened area to strengthen it. Steps to Take   Home Care    Follow these guidelines after surgery:   Rest. Try to move as tolerated. A mix of rest and light activity improves healing. The incision area may be sore for a few days. To minimize pain and soreness: Take pain medicine as directed. Avoid strenuous activity and heavy lifting. Keep the incision area clean and dry. Follow your doctor's instructions for changing the bandages, such as:   Wash your hands thoroughly. Cleanse the site with lukewarm water and mild soap. Use a soft wash cloth to gently wipe the incision area. Gently sponge bathe or shower. Do not scrub the incision areas. Avoid baths or swimming for one week. Diet    You can return to your regular diet. You may work with a dietician who will help you follow a heart-healthy diet. Physical Activity    You will feel sore after the surgery. Try to walk steadily within two weeks. You may be able to return to normal activities within 1-3 weeks. While recovering, you will need to avoid strenuous activities, like heavy lifting. Ask your doctor when you will be able to return to work. Do not drive unless your doctor has given you permission to do so. Medications    Your doctor may recommend:   Over-the-counter or prescription pain medicine   Aspirin or a cholesterol-lowering drug to prevent complications   If you had to stop medicines before the procedure, ask your doctor when you can start again.  Medicines that may have been stopped include:   Anti-inflammatory drugs (eg, aspirin, ibuprofen)   Blood thinners, like warfarin (Coumadin)   Clopidogrel (Plavix)   When taking medicines: Take your medicine as directed. Do not change the amount or the schedule. Do not stop taking them without talking to your doctor. Do not share them. Know what results and side effects to look for. Report them to your doctor. Some drugs can be dangerous when mixed. Talk to a doctor or pharmacist if you are taking more than one drug. This includes over-the-counter medicines and herb or dietary supplements. Plan ahead for refills so you do not run out. Lifestyle Changes    You and your doctor will plan lifestyle changes that will help you recover. Some things to keep in mind include: Atherosclerosis and high blood pressure should be carefully managed. This can be done with medicines and a healthy lifestyle. If you smoke, talk to your doctor about quitting. Follow-up   To check for stent graft problems, schedule regular follow-up testing as directed by your doctor. Be sure to go to all of your appointments. Call Your Doctor If Any of the Following Occurs   After you leave the hospital, call your doctor if any of the following occurs:   Redness, swelling, increasing pain, excessive bleeding, or discharge at the incision site   Signs of infection, including fever and chills   New abdominal pain   Back pain   Any change of color or sensation in your legs or feet   Burning, pain, or other problems when urinating   Nausea or vomiting   Abdominal cramps or diarrhea   Unusual fatigue or depression   Disorientation or confusion   Numbness or tingling in the legs   New, unexplained symptoms   Cough   Call 911 or go to the emergency room right away if you have:   Shortness of breath   Chest pain   If you think you have an emergency,  CALL 911.

## 2022-09-09 NOTE — PROGRESS NOTES
Vascular Surgery Progress Note    Pt is being seen in f/u today regarding status post TEVAR with right femoral cutdown 9/8    Subjective  Pt s/e. No chest, abdominal, lower extremity pain. Urinating appropriately. Blood pressure within normal limits.     Current Medications:    lactated ringers 125 mL/hr at 09/08/22 1519    sodium chloride      niCARdipine        sodium chloride flush, sodium chloride, HYDROmorphone, ondansetron    amLODIPine  10 mg Oral Daily    aspirin  81 mg Oral Daily    cholestyramine light  4 g Oral BID    pantoprazole  40 mg Oral Daily    furosemide  20 mg Oral Daily    lisinopril  20 mg Oral Daily    metoprolol  50 mg Oral Nightly    mirtazapine  15 mg Oral Nightly    PARoxetine  20 mg Oral Daily    rosuvastatin  10 mg Oral Nightly    sodium chloride flush  5-40 mL IntraVENous 2 times per day    enoxaparin  40 mg SubCUTAneous Daily        PHYSICAL EXAM:    BP (!) 135/45   Pulse 71   Temp 98.1 °F (36.7 °C) (Temporal)   Resp 17   Ht 5' 10\" (1.778 m)   Wt 162 lb 14.4 oz (73.9 kg)   SpO2 98%   BMI 23.37 kg/m²     Intake/Output Summary (Last 24 hours) at 9/9/2022 0704  Last data filed at 9/9/2022 0600  Gross per 24 hour   Intake 3123.87 ml   Output 3290 ml   Net -166.13 ml        Gen: awake, alert and oriented x3, no apparent distress  CVS: RRR  Resp: No increased work of breathing  Abd: Soft, non-tender, non-distended  RUE: 5/5 strength 2+ radial   LUE: 5/5 strength 2+ radial   R LE: 5/5 strength, warm, dopplerable dp/pt  L LE: 5/5 strength, warm, dopplerable dp/pt    LABS:    Lab Results   Component Value Date    WBC 9.3 09/08/2022    HGB 11.7 (L) 09/08/2022    HCT 36.1 (L) 09/08/2022     09/08/2022    PROTIME 12.4 09/01/2022    INR 1.1 09/01/2022    APTT 27.5 05/01/2022    K 4.2 09/08/2022    BUN 17 09/08/2022    CREATININE 1.0 09/08/2022       A/P  80 y.o. male status post TEVAR 9/8    Diet  Fluids off  Chappell out  A-line out  Out of bed    Hopefully DC later today      Brent Shepard More Rush MD  Pt seen and examined    R groin c/d/I, some ecchymosis, no hematoma  R DP and PT biphasic    B/L UE 5/5 strength , flex, ext  B/L LE 5/5 DF/PF    Pain well controlled  Bp controlled now  Off cardene    Remove art line, azar  Ambulate  Dc planning    Amaryllis Prader, MD

## 2022-09-09 NOTE — PROGRESS NOTES
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     Important/Relevant PMH/PSH: HPL, CAD, GERD, BPH, Psoriasis, OCD, anxiety, former smoker      Procedure/Surgeries: 9/8/2022 THORACIC AORTIC ANEURYSM REPAIR ENDOVASCULAR CTAG 34x10  Unilateral femoral artery access under US guidance and closure using perclose technique  Right femoral artery cutdown - repair of right common femoral artery      Intake/Output Summary (Last 24 hours) at 9/9/2022 0825  Last data filed at 9/9/2022 0600  Gross per 24 hour   Intake 3123.87 ml   Output 3290 ml   Net -166.13 ml       Recent Labs     09/08/22  1112   WBC 9.3   HGB 11.7*   HCT 36.1*         Lab Results   Component Value Date/Time     09/08/2022 11:12 AM    K 4.2 09/08/2022 11:12 AM    K 4.2 09/01/2022 12:00 PM     09/08/2022 11:12 AM    CO2 21 09/08/2022 11:12 AM    BUN 17 09/08/2022 11:12 AM    CREATININE 1.0 09/08/2022 11:12 AM    GLUCOSE 103 09/08/2022 11:12 AM    GLUCOSE 92 09/28/2011 09:30 AM    CALCIUM 8.6 09/08/2022 11:12 AM         Physical Exam:    BP (!) 135/45   Pulse 71   Temp 98.1 °F (36.7 °C) (Temporal)   Resp 17   Ht 5' 10\" (1.778 m)   Wt 162 lb 14.4 oz (73.9 kg)   SpO2 98%   BMI 23.37 kg/m²       General: Pleasant, awake, alert. Hemodynamically stable. Eyes: PERRL, anicteric   Pulmonary:  No wheezes, no accessory muscle use noted   Cardiovascular:  RRR, no heaves or thrills on palpation  Tele: SR   Abdomen: Soft, nontender  Extremities: Palpable pulses all extremities, no edema   Neurologic/Psych: A&Ox3, HERNANDEZ to command   Skin: Warm and dry  Incisions: Right groin incision well approximated, minimal ecchymosis noted       Assessment/Plan: POD #1    1.  S/p TEVAR   - Frequent neurovascular checks  - Advance diet  - Chappell catheter removed, monitor for void   - OOBTC, ambulate with nursing   - Pain well controlled, did not receive any pain medication overnight        Dispo: Discharge home later today, f/u with Dr. Ashley Jeronimo in 2 weeks     Electronically signed by ORVILLE Joshua CNP on 9/9/2022 at 8:25 AM

## 2022-09-09 NOTE — CARE COORDINATION
Pod #1 s/p TEVAR. Met with pt to discuss discharge planning. He lives alone in a 2 story home with b&b on 2nd floor, half bath on 1st floor. He is independent in adl's and uses a st cane for ambulation. Hx of xavier at [facility] and [facility]. HX of hhc with [facility]. Plan is to return home. His nephew and niece assist as needed. Offered hhc per protocol. He is agreeable to [facility] [place]. Referral called to [facility] but they cannot accept d/t staffing. Pt has no other preference. Referral made to [facility] at [facility]. [facility] has accepted. Pt states nephew will transport him home.

## 2022-09-09 NOTE — PROGRESS NOTES
Vascular Surgery Progress Note    Pt is being seen in f/u today regarding s/p TEVAR    Subjective  Pt s/e. Denies chest or back pain. Denies right groin pain. Hemodynamically stable overnight.     Current Medications:    sodium chloride      niCARdipine        sodium chloride flush, sodium chloride, HYDROmorphone, ondansetron    amLODIPine  10 mg Oral Daily    aspirin  81 mg Oral Daily    cholestyramine light  4 g Oral BID    pantoprazole  40 mg Oral Daily    furosemide  20 mg Oral Daily    lisinopril  20 mg Oral Daily    metoprolol  50 mg Oral Nightly    mirtazapine  15 mg Oral Nightly    PARoxetine  20 mg Oral Daily    rosuvastatin  10 mg Oral Nightly    sodium chloride flush  5-40 mL IntraVENous 2 times per day    enoxaparin  40 mg SubCUTAneous Daily      PHYSICAL EXAM:    BP (!) 135/45   Pulse 71   Temp 98.1 °F (36.7 °C) (Temporal)   Resp 17   Ht 5' 10\" (1.778 m)   Wt 162 lb 14.4 oz (73.9 kg)   SpO2 98%   BMI 23.37 kg/m²     Intake/Output Summary (Last 24 hours) at 9/9/2022 0741  Last data filed at 9/9/2022 0600  Gross per 24 hour   Intake 3123.87 ml   Output 3290 ml   Net -166.13 ml        Gen awake, alert, in no apparent distress  CVS RRR  Resp easy, nonlabored  Abd soft, ndnt  R LE Groin incision soft, mild ecchymosis 2+DP/PT  L LE  2+ DP/PT    LABS:    Lab Results   Component Value Date    WBC 9.3 09/08/2022    HGB 11.7 (L) 09/08/2022    HCT 36.1 (L) 09/08/2022     09/08/2022    PROTIME 12.4 09/01/2022    INR 1.1 09/01/2022    APTT 27.5 05/01/2022    K 4.2 09/08/2022    BUN 17 09/08/2022    CREATININE 1.0 09/08/2022     A/P POD #1 s/p TEVAR  Vascular exam intact  D/c invasive lines  Advance diet  Ambulate  Possible discharge home this afternoon  F/u in office - will arrange     Pt seen and plan reviewed with Dr. Nicolás Pimentel, APRN - CNP

## 2022-09-09 NOTE — PLAN OF CARE
Problem: Discharge Planning  Goal: Discharge to home or other facility with appropriate resources  9/9/2022 1457 by Lore Brooke RN  Outcome: Progressing  9/9/2022 0518 by Bhavesh Eubanks RN  Outcome: Progressing     Problem: Pain  Goal: Verbalizes/displays adequate comfort level or baseline comfort level  9/9/2022 1457 by Lore Brooke RN  Outcome: Progressing  9/9/2022 0518 by Bhavesh Eubanks RN  Outcome: Progressing

## 2022-09-16 LAB
BLOOD BANK DISPENSE STATUS: NORMAL
BLOOD BANK PRODUCT CODE: NORMAL
BPU ID: NORMAL
DESCRIPTION BLOOD BANK: NORMAL

## 2022-09-19 ENCOUNTER — TELEPHONE (OUTPATIENT)
Dept: SURGERY | Age: 87
End: 2022-09-19

## 2022-09-19 ENCOUNTER — OFFICE VISIT (OUTPATIENT)
Dept: SURGERY | Age: 87
End: 2022-09-19
Payer: MEDICARE

## 2022-09-19 VITALS
BODY MASS INDEX: 23.19 KG/M2 | DIASTOLIC BLOOD PRESSURE: 60 MMHG | WEIGHT: 162 LBS | TEMPERATURE: 98.2 F | HEIGHT: 70 IN | SYSTOLIC BLOOD PRESSURE: 110 MMHG | HEART RATE: 68 BPM

## 2022-09-19 DIAGNOSIS — K40.90 LEFT INGUINAL HERNIA: Primary | ICD-10-CM

## 2022-09-19 PROCEDURE — 1123F ACP DISCUSS/DSCN MKR DOCD: CPT | Performed by: SURGERY

## 2022-09-19 PROCEDURE — 99214 OFFICE O/P EST MOD 30 MIN: CPT | Performed by: SURGERY

## 2022-09-19 RX ORDER — SODIUM CHLORIDE 0.9 % (FLUSH) 0.9 %
5-40 SYRINGE (ML) INJECTION PRN
Status: CANCELLED | OUTPATIENT
Start: 2022-09-19

## 2022-09-19 RX ORDER — SODIUM CHLORIDE 9 MG/ML
INJECTION, SOLUTION INTRAVENOUS CONTINUOUS
Status: CANCELLED | OUTPATIENT
Start: 2022-09-19

## 2022-09-19 RX ORDER — SODIUM CHLORIDE 0.9 % (FLUSH) 0.9 %
5-40 SYRINGE (ML) INJECTION EVERY 12 HOURS SCHEDULED
Status: CANCELLED | OUTPATIENT
Start: 2022-09-19

## 2022-09-19 RX ORDER — SODIUM CHLORIDE 9 MG/ML
INJECTION, SOLUTION INTRAVENOUS PRN
Status: CANCELLED | OUTPATIENT
Start: 2022-09-19

## 2022-09-19 NOTE — PROGRESS NOTES
thrombosis, right 5/3/2022    Acute pancreatitis      AND     Anxiety     BPH (benign prostatic hyperplasia)     CAD (coronary artery disease)     GERD (gastroesophageal reflux disease)     Hyperlipidemia     Obsessive compulsive disorder     Psoriasis        Past Surgical History:   Procedure Laterality Date    CATARACT REMOVAL Bilateral 2017    COLONOSCOPY  2017    THORACIC AORTIC ANEURYSM REPAIR N/A 2022    THORACIC AORTIC ANEURYSM REPAIR ENDOVASCULAR performed by Elena Ndiaye MD at [place] [address] ENDOSCOPY  2017       No Known Allergies    The patient has a family history that is negative for severe cardiovascular or respiratory issues, negative for reaction to anesthesia. Time spent reviewing past medical, surgical, social and family history, vitals, nursing assessment and images. No changes from above documented history.     Social History     Socioeconomic History    Marital status:      Spouse name: Not on file    Number of children: Not on file    Years of education: Not on file    Highest education level: Not on file   Occupational History    Not on file   Tobacco Use    Smoking status: Former     Types: Cigarettes     Quit date: 1969     Years since quittin.7    Smokeless tobacco: Never   Substance and Sexual Activity    Alcohol use: Yes     Comment: OCCASIONAL BEER    Drug use: No    Sexual activity: Not on file   Other Topics Concern    Not on file   Social History Narrative    Not on file     Social Determinants of Health     Financial Resource Strain: Low Risk     Difficulty of Paying Living Expenses: Not hard at all   Food Insecurity: No Food Insecurity    Worried About Running Out of Food in the Last Year: Never true    Ran Out of Food in the Last Year: Never true   Transportation Needs: Not on file   Physical Activity: Not on file   Stress: Not on file   Social Connections: Not on file   Intimate Partner Violence: Not on file   Housing Stability: Not on file         A complete 10 system review was performed and are otherwise negative unless mentioned in the above HPI. Specific negatives are listed below but may not include all those reviewed. General ROS: negative obtundation, AMS  ENT ROS: negative rhinorrhea, epistaxis  Allergy and Immunology ROS: negative itchy/watery eyes or nasal congestion  Hematological and Lymphatic ROS: negative spontaneous bleeding or bruising  Endocrine ROS: negative  lethargy, mood swings, palpitations or polydipsia/polyuria  Respiratory ROS: negative sputum changes, stridor, tachypnea or wheezing  Cardiovascular ROS: negative for - loss of consciousness, murmur or orthopnea  Gastrointestinal ROS: negative for - hematochezia or hematemesis  Genito-Urinary ROS: negative for -  genital discharge or hematuria  Musculoskeletal ROS: negative for - focal weakness, gangrene  Psych/Neuro ROS: negative for - visual or auditory hallucinations, suicidal ideation    Physical exam:   There were no vitals taken for this visit. General appearance:  NAD, appears stated age  Head: NCAT, PERRLA, EOMI, red conjunctiva  Neck: supple, no masses, trachea midline  Lungs: Equal chest rise bilateral, no retractions, no wheezing  Heart: Reg rate  Abdomen: soft, nontender nondistended  Skin; warm and dry, no cyanosis  Gu: no cva tenderness, partially reducible left inguinal hernia containing sigmoid colon  Extremities: atraumatic, no focal motor deficits, no open wounds  Psych: No tremor, visual hallucinations      Radiology:  Radiology: I reviewed relevant abdominal imaging from this admission and that available in the EMR including CT abd/pel from May 2022.  My assessment is left inguinal hernia containing sigmoid colon, large paraesophageal hernia          Assessment:  Heather Cr is a 80 y.o. male with symptomatic left inguinal hernia, nonrecurrent, large paraesophageal hernia  Patient Active Problem List Diagnosis    Psoriasis    BPH (benign prostatic hyperplasia)    CAD (coronary artery disease)    Obsessive neurosis    Essential hypertension    Irritable bowel syndrome with diarrhea    Hiatal hernia with GERD    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HCC)    Moderate protein-calorie malnutrition (HCC)    Acute basilic vein thrombosis, right    Penetrating atherosclerotic ulcer of aorta (HCC)    Thoracic aortic aneurysm (HCC)    Preop cardiovascular exam         Plan:    OR for lap robot assisted left inguinal hernia repair with mesh Hospital bilateral  Medical clearance  Discussed the risk, benefits and alternatives of surgery including wound infections, bleeding, scar, recurrent hernia formation, mesh infection and migration, chronic groin pain, nerve injury, testicle injury, repeat procedures and the risks of general anesthetic including MI, CVA, sudden death or reactions to anesthetic medications. The patient understands the risks and alternatives and the possibility of converting to an open procedure. All questions were answered to the patient's satisfaction and they freely signed the consent.            Madhavi Adam MD  11:14 AM  9/19/2022

## 2022-09-19 NOTE — TELEPHONE ENCOUNTER
Per Dr. Luis Maria , patient is scheduled for Laparoscopic robotic left inguinal hernia repair with mesh possible bilateral  at [address] [place] [address] on 10/25/22. Surgery scheduled via fax, surgeon's calendar updated. Dr. Luis Maria to enter orders. Follow up appointment scheduled. Medical clearance requested from Dr. Nila Kelley. Electronically signed by Latha Powell RN on 2022 at 12:00 PM    Medical clearance received from Dr. Nila Kelley. Electronically signed by Latha Powell RN on 10/5/2022 at 7:46 AM          Prior Authorization Form:      DEMOGRAPHICS:                     Patient Name:  Rosalva Dee  Patient :  1935            Insurance:  Payor: Jerry Larry / Plan: Erma Regalado ESSENTIAL/PLUS / Product Type: *No Product type* /   Insurance ID Number:    Payer/Plan Subscr  Sex Relation Sub. Ins. ID Effective Group Num   1.  BCBS MEDICAREMelford Mech 1935 Male Self [id number] [facility]22 [id number]                                    [id number]         DIAGNOSIS & PROCEDURE:                       Procedure/Operation: Laparoscopic robotic left inguinal hernia repair with mesh possible bilateral            CPT Code: 69738    Diagnosis:  Left inguinal hernia     ICD10 Code: k40.90    Location:  [address] [place] [address]    Surgeon:  Mayur Ferreira INFORMATION:                          Date: 10/25/22    Time: TBD              Anesthesia:  General                                                       Status:  Outpatient        Special Comments:         Electronically signed by Latha Powell RN on 2022 at 12:00 PM

## 2022-09-26 ENCOUNTER — TELEPHONE (OUTPATIENT)
Dept: VASCULAR SURGERY | Age: 87
End: 2022-09-26

## 2022-09-26 ENCOUNTER — OFFICE VISIT (OUTPATIENT)
Dept: VASCULAR SURGERY | Age: 87
End: 2022-09-26

## 2022-09-26 DIAGNOSIS — I71.20 THORACIC AORTIC ANEURYSM WITHOUT RUPTURE: Primary | ICD-10-CM

## 2022-09-26 PROCEDURE — 99024 POSTOP FOLLOW-UP VISIT: CPT | Performed by: NURSE PRACTITIONER

## 2022-09-26 RX ORDER — HYDROCODONE BITARTRATE AND ACETAMINOPHEN 5; 325 MG/1; MG/1
1 TABLET ORAL EVERY 6 HOURS PRN
COMMUNITY
End: 2022-10-04

## 2022-09-26 RX ORDER — SENNA PLUS 8.6 MG/1
1 TABLET ORAL DAILY
COMMUNITY
End: 2022-10-04

## 2022-09-26 NOTE — PROGRESS NOTES
9/26/2022    Timbo Dee  1935    Previous Procedures  9/8/22 TEVAR, repair right common femoral artery     Patient returns for post operative evaluation status post endovascular repair of penetrating ulcer of the thoracic aorta. The patient denies any unexpected problems since the procedure. Physical Exam:    Gen Awake and Alert  [facility] RRR   Right LE   Groin incision is clean/dry/intact     No evidence of hematoma or cellulitis   1+ DP, biphasic PT  Left LE   Biphasic DP/PT    A/P S/P Endovascular repair of thoracic aortic DAIJA   Continue Medical management with ASA,  statin, and  B Blocker   I reviewed with the patient that normal activities can be resumed as tolerated  Return in 6-12 months for follow-up office visit  Imaging study (CT scan / Ultrasound ) in 1 month     Pt seen and plan reviewed with Dr. Hesham Shore.      Marissa [place], [place] - CNP

## 2022-09-26 NOTE — TELEPHONE ENCOUNTER
Notified patient of CTA at [facility] on Wed, 10-5-22 at 12:00 pm. [place] at 11:30 am.  NPO after 9:00 am.

## 2022-10-04 PROBLEM — I71.9 PENETRATING ATHEROSCLEROTIC ULCER OF AORTA (HCC): Status: RESOLVED | Noted: 2022-08-01 | Resolved: 2022-10-04

## 2022-10-04 PROBLEM — I71.20 THORACIC AORTIC ANEURYSM (HCC): Status: RESOLVED | Noted: 2022-09-08 | Resolved: 2022-10-04

## 2022-10-04 PROBLEM — I82.611 ACUTE BASILIC VEIN THROMBOSIS, RIGHT: Status: RESOLVED | Noted: 2022-05-03 | Resolved: 2022-10-04

## 2022-10-05 ENCOUNTER — HOSPITAL ENCOUNTER (OUTPATIENT)
Dept: CT IMAGING | Age: 87
Discharge: HOME | End: 2022-10-07
Payer: MEDICARE

## 2022-10-05 DIAGNOSIS — I71.20 THORACIC AORTIC ANEURYSM WITHOUT RUPTURE: ICD-10-CM

## 2022-10-05 PROCEDURE — 2580000003 HC RX 258: Performed by: RADIOLOGY

## 2022-10-05 PROCEDURE — 71275 CT ANGIOGRAPHY CHEST: CPT

## 2022-10-05 PROCEDURE — 6360000004 HC RX CONTRAST MEDICATION: Performed by: RADIOLOGY

## 2022-10-05 RX ORDER — SODIUM CHLORIDE 0.9 % (FLUSH) 0.9 %
10 SYRINGE (ML) INJECTION
Status: COMPLETED | OUTPATIENT
Start: 2022-10-05 | End: 2022-10-05

## 2022-10-05 RX ADMIN — IOPAMIDOL 75 ML: 755 INJECTION, SOLUTION INTRAVENOUS at 11:57

## 2022-10-05 RX ADMIN — SODIUM CHLORIDE, PRESERVATIVE FREE 10 ML: 5 INJECTION INTRAVENOUS at 11:57

## 2022-10-08 PROBLEM — Z01.810 PREOP CARDIOVASCULAR EXAM: Status: RESOLVED | Noted: 2022-09-08 | Resolved: 2022-10-08

## 2022-10-21 ENCOUNTER — HOSPITAL ENCOUNTER (OUTPATIENT)
Dept: PREADMISSION TESTING | Age: 87
Discharge: HOME | End: 2022-10-21
Payer: MEDICARE

## 2022-10-21 VITALS
HEART RATE: 59 BPM | RESPIRATION RATE: 20 BRPM | WEIGHT: 163 LBS | TEMPERATURE: 97.5 F | DIASTOLIC BLOOD PRESSURE: 70 MMHG | BODY MASS INDEX: 23.39 KG/M2 | OXYGEN SATURATION: 100 % | SYSTOLIC BLOOD PRESSURE: 141 MMHG

## 2022-10-21 DIAGNOSIS — Z01.818 PREOP TESTING: Primary | ICD-10-CM

## 2022-10-21 LAB
ANION GAP SERPL CALCULATED.3IONS-SCNC: 8 MMOL/L (ref 7–16)
BUN BLDV-MCNC: 17 MG/DL (ref 6–23)
CALCIUM SERPL-MCNC: 9.4 MG/DL (ref 8.6–10.2)
CHLORIDE BLD-SCNC: 104 MMOL/L (ref 98–107)
CO2: 25 MMOL/L (ref 22–29)
CREAT SERPL-MCNC: 1 MG/DL (ref 0.7–1.2)
GFR SERPL CREATININE-BSD FRML MDRD: >60 ML/MIN/1.73
GLUCOSE BLD-MCNC: 85 MG/DL (ref 74–99)
HCT VFR BLD CALC: 37.6 % (ref 37–54)
HEMOGLOBIN: 11.8 G/DL (ref 12.5–16.5)
MCH RBC QN AUTO: 29.6 PG (ref 26–35)
MCHC RBC AUTO-ENTMCNC: 31.4 % (ref 32–34.5)
MCV RBC AUTO: 94.2 FL (ref 80–99.9)
PDW BLD-RTO: 14.9 FL (ref 11.5–15)
PLATELET # BLD: 224 E9/L (ref 130–450)
PMV BLD AUTO: 10.1 FL (ref 7–12)
POTASSIUM REFLEX MAGNESIUM: 3.9 MMOL/L (ref 3.5–5)
RBC # BLD: 3.99 E12/L (ref 3.8–5.8)
SODIUM BLD-SCNC: 137 MMOL/L (ref 132–146)
WBC # BLD: 10.5 E9/L (ref 4.5–11.5)

## 2022-10-21 PROCEDURE — 85027 COMPLETE CBC AUTOMATED: CPT

## 2022-10-21 PROCEDURE — 80048 BASIC METABOLIC PNL TOTAL CA: CPT

## 2022-10-21 PROCEDURE — 36415 COLL VENOUS BLD VENIPUNCTURE: CPT

## 2022-10-21 RX ORDER — ESOMEPRAZOLE MAGNESIUM 40 MG/1
40 CAPSULE, DELAYED RELEASE ORAL
COMMUNITY
Start: 2022-10-12

## 2022-10-21 RX ORDER — FAMOTIDINE 20 MG/1
20 TABLET, FILM COATED ORAL DAILY
COMMUNITY
End: 2022-10-21

## 2022-10-21 ASSESSMENT — PAIN DESCRIPTION - ORIENTATION: ORIENTATION: LEFT

## 2022-10-21 ASSESSMENT — PAIN DESCRIPTION - DESCRIPTORS: DESCRIPTORS: SORE

## 2022-10-21 ASSESSMENT — PAIN SCALES - GENERAL: PAINLEVEL_OUTOF10: 3

## 2022-10-21 ASSESSMENT — PAIN DESCRIPTION - ONSET: ONSET: ON-GOING

## 2022-10-21 ASSESSMENT — PAIN DESCRIPTION - PAIN TYPE: TYPE: CHRONIC PAIN

## 2022-10-21 ASSESSMENT — PAIN DESCRIPTION - LOCATION: LOCATION: GROIN

## 2022-10-21 ASSESSMENT — PAIN DESCRIPTION - FREQUENCY: FREQUENCY: INTERMITTENT

## 2022-10-21 NOTE — PROGRESS NOTES
[address] [facility]                                                                                                                    PRE OP INSTRUCTIONS FOR  David Israel        Date: 10/21/2022    Date of surgery: 10/25/22   Arrival Time: Hospital will call you between 5pm and 7pm Monday evening with your final arrival time for surgery    Do not eat or drink anything after midnight prior to surgery. This includes no water, chewing gum, mints or ice chips. Take the following medications with a small sip of water on the morning of Surgery: Esomeprazole and Amlodipine    Diabetics may take evening dose of insulin but none after midnight. If you feel symptomatic or low blood sugar morning of surgery drink 1-2 ounces of apple juice only. Aspirin, Ibuprofen, Advil, Naproxen, Vitamin E and other Anti-inflammatory products should be stopped  before surgery  as directed by your physician. Take Tylenol only unless instructed otherwise by your surgeon. Check with your Doctor regarding stopping Plavix, Coumadin, Lovenox, Eliquis, Effient, or other blood thinners. Do not smoke,use illicit drugs and do not drink any alcoholic beverages 24 hours prior to surgery. You may brush your teeth the morning of surgery. DO NOT SWALLOW WATER    You MUST make arrangements for a responsible adult to take you home after your surgery. You will not be allowed to leave alone or drive yourself home. It is strongly suggested someone stay with you the first 24 hrs. Your surgery will be cancelled if you do not have a ride home. PEDIATRIC PATIENTS ONLY:  A parent/legal guardian must accompany a child scheduled for surgery and plan to stay at the hospital until the child is discharged. Please do not bring other children with you.     Please wear simple, loose fitting clothing to the hospital.  [facility] not bring valuables (money, credit cards, checkbooks, etc.) Do not wear any makeup (including no eye makeup) or nail polish on your fingers or toes. DO NOT wear any jewelry or piercings on day of surgery. All body piercing jewelry must be removed. Shower the night before surgery with _x__Antibacterial soap /MARTHA WIPES________    TOTAL JOINT REPLACEMENT/HYSTERECTOMY PATIENTS ONLY---Remember to bring Blood Bank bracelet to the hospital on the day of surgery. If you have a Living Will and Durable Power of  for Healthcare, please bring in a copy. If appropriate bring crutches, inspirex, WALKER, CANE etc... Notify your Surgeon if you develop any illness between now and surgery time, cough, cold, fever, sore throat, nausea, vomiting, etc.  Please notify your surgeon if you experience dizziness, shortness of breath or blurred vision between now & the time of your surgery. If you have ___dentures, they will be removed before going to the OR; we will provide you a container. If you wear ___contact lenses or _x__glasses, they will be removed; please bring a case for them. To provide excellent care visitors will be limited to 2 in the room at any given time. Please bring picture ID and insurance card. Sleep apnea patients need to bring CPAP AND SETTINGS to hospital on day of surgery. During flu season no children under the age of 15 are permitted in the hospital for the safety of all patients. Other                   Please call AMBULATORY CARE if you have any further questions.    [address] [facility]     [address]

## 2022-10-24 ENCOUNTER — ANESTHESIA EVENT (OUTPATIENT)
Dept: OPERATING ROOM | Age: 87
End: 2022-10-24
Payer: MEDICARE

## 2022-10-24 NOTE — ANESTHESIA PRE PROCEDURE
Department of Anesthesiology  Preprocedure Note       Name:  Bautista Summers   Age:  80 y.o.  :  1935                                          MRN:  [id number]         Date:  10/25/2022      Surgeon: Christopher Marvin):  Bianca Collier MD    Procedure: Procedure(s):  LAPAROSCOPIC ROBOTIC XI LEFT INGUINAL HERNIA REPAIR WITH MESH POSSIBLE BILATERAL    Medications prior to admission:   Prior to Admission medications    Medication Sig Start Date End Date Taking? Authorizing Provider   rosuvastatin (CRESTOR) 10 MG tablet TAKE 1 TABLET BY MOUTH AT  NIGHT 10/18/22   Jesus Walsh,    esomeprazole (NEXIUM) 40 MG delayed release capsule Take 40 mg by mouth every morning (before breakfast) 10/12/22   Historical Provider, MD   metoprolol tartrate (LOPRESSOR) 50 MG tablet Take 50 mg by mouth daily 22   Historical Provider, MD   amLODIPine (NORVASC) 10 MG tablet Take 1 tablet by mouth daily 9/20/22 10/25/22  Jesus Kenney DO   mirtazapine (REMERON) 15 MG tablet Take 1 tablet by mouth nightly 22   Jesus Kenney DO   PARoxetine (PAXIL) 20 MG tablet ONE DAILY 22   Jesus Kenney DO   furosemide (LASIX) 20 MG tablet Take 1 tablet by mouth in the morning.  22   Karen Lujan DO   colesevelam (WELCHOL) 625 MG tablet Take 1 tablet by mouth 2 times daily (with meals) 22   Jesus Kenney DO   ferrous sulfate (IRON 325) 325 (65 Fe) MG tablet Take 1 tablet by mouth daily (with breakfast) 22   Jesus Kenney DO   metoprolol (LOPRESSOR) 100 MG tablet Take 1 tablet by mouth 3 times daily  Patient taking differently: Take 50 mg by mouth nightly 22   Jesus Kenney DO   lisinopril (PRINIVIL;ZESTRIL) 20 MG tablet Take 1 tablet by mouth daily 22   Karen Lujan DO   aspirin 81 MG chewable tablet Take 1 tablet by mouth daily 5/3/22   Misa Pittman MD   Bismuth Subsalicylate (PEPTO-BISMOL PO) Take by mouth as needed    Historical Provider, MD   Multiple Vitamins-Minerals (Samaria Hof AREDS FORMULA/LUTEIN PO) Take by mouth Daily    Historical Provider, MD       Current medications:    Current Facility-Administered Medications   Medication Dose Route Frequency Provider Last Rate Last Admin    0.9 % sodium chloride infusion   IntraVENous Continuous Delfino Reyes  mL/hr at 10/25/22 0729 NoRateChange at 10/25/22 0729    sodium chloride flush 0.9 % injection 5-40 mL  5-40 mL IntraVENous 2 times per day Delfino Reyes MD        sodium chloride flush 0.9 % injection 5-40 mL  5-40 mL IntraVENous PRN Delfino Reyes MD        0.9 % sodium chloride infusion   IntraVENous PRN Delfino Reyes MD         Facility-Administered Medications Ordered in Other Encounters   Medication Dose Route Frequency Provider Last Rate Last Admin    rocuronium (ZEMURON) injection   IntraVENous PRN Jd Minus, RN   35 mg at 10/25/22 0741    fentaNYL (SUBLIMAZE) injection   IntraVENous PRN Jd Minus, RN   100 mcg at 10/25/22 0737    lidocaine PF 2 % injection   IntraVENous PRN Jd Minus, RN   60 mg at 10/25/22 8217    propofol injection   IntraVENous PRN Jd Minus, RN   120 mg at 10/25/22 6099    midazolam (VERSED) injection   IntraVENous PRN Jd Minus, RN   1 mg at 10/25/22 0189    Glycopyrrolate injection   IntraVENous PRN Jd Minus, RN   0.2 mg at 10/25/22 2466    ePHEDrine injection   IntraVENous PRN Jd Minus, RN   10 mg at 10/25/22 8596       Allergies:  No Known Allergies    Problem List:    Patient Active Problem List   Diagnosis Code    Psoriasis L40.9    BPH (benign prostatic hyperplasia) N40.0    CAD (coronary artery disease) I25.10    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    Essential hypertension I10    Irritable bowel syndrome with diarrhea K58.0    Hiatal hernia with GERD K44.9, K21.9    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HCC) I71.03    Moderate protein-calorie malnutrition ([facility] [address] E44.0       Past Medical History:        Diagnosis Date    Acute basilic vein thrombosis, right 2022    Acute pancreatitis      AND     Anxiety     BPH (benign prostatic hyperplasia)     CAD (coronary artery disease)     COVID-19     GERD (gastroesophageal reflux disease)     Hyperlipidemia     Obsessive compulsive disorder     Psoriasis        Past Surgical History:        Procedure Laterality Date    CATARACT REMOVAL Bilateral 2017    COLONOSCOPY  2017    THORACIC AORTIC ANEURYSM REPAIR N/A 2022    THORACIC AORTIC ANEURYSM REPAIR ENDOVASCULAR performed by Misa Pittman MD at [address]PP[address] GASTROINTESTINAL ENDOSCOPY  2017       Social History:    Social History     Tobacco Use    Smoking status: Former     Types: Cigarettes     Quit date: 1969     Years since quittin.8    Smokeless tobacco: Never   Substance Use Topics    Alcohol use: Yes     Comment: OCCASIONAL BEER                                Counseling given: Not Answered      Vital Signs (Current):   Vitals:    10/25/22 06   BP: (!) 147/67   Pulse: 53   Resp: 16   Temp: 98 °F (36.7 °C)   TempSrc: Infrared   SpO2: 97%                                              BP Readings from Last 3 Encounters:   10/25/22 (!) 147/67   10/21/22 (!) 141/70   10/04/22 112/68       NPO Status: Time of last liquid consumption:                         Time of last solid consumption:                         Date of last liquid consumption: 10/24/22                        Date of last solid food consumption: 10/24/22    BMI:   Wt Readings from Last 3 Encounters:   10/21/22 163 lb (73.9 kg)   10/04/22 163 lb (73.9 kg)   22 162 lb (73.5 kg)     There is no height or weight on file to calculate BMI.    CBC:   Lab Results   Component Value Date/Time    WBC 10.5 10/21/2022 02:20 PM    RBC 3.99 10/21/2022 02:20 PM    HGB 11.8 10/21/2022 02:20 PM    HCT 37.6 10/21/2022 02:20 PM    MCV 94.2 10/21/2022 02:20 PM    RDW 14.9 10/21/2022 02:20 PM     10/21/2022 02:20 PM       CMP:   Lab Results   Component Value Date/Time     10/21/2022 02:20 PM    K 3.9 10/21/2022 02:20 PM     10/21/2022 02:20 PM    CO2 25 10/21/2022 02:20 PM    BUN 17 10/21/2022 02:20 PM    CREATININE 1.0 10/21/2022 02:20 PM    GFRAA >60 09/08/2022 11:12 AM    LABGLOM >60 10/21/2022 02:20 PM    GLUCOSE 85 10/21/2022 02:20 PM    GLUCOSE 92 09/28/2011 09:30 AM    PROT 7.1 05/01/2022 04:08 AM    CALCIUM 9.4 10/21/2022 02:20 PM    BILITOT 0.6 05/01/2022 04:08 AM    ALKPHOS 91 05/01/2022 04:08 AM    AST 16 05/01/2022 04:08 AM    ALT 16 05/01/2022 04:08 AM       POC Tests: No results for input(s): POCGLU, POCNA, POCK, POCCL, POCBUN, POCHEMO, POCHCT in the last 72 hours. Coags:   Lab Results   Component Value Date/Time    PROTIME 12.4 09/01/2022 12:00 PM    INR 1.1 09/01/2022 12:00 PM    APTT 27.5 05/01/2022 04:08 AM       HCG (If Applicable): No results found for: PREGTESTUR, PREGSERUM, HCG, HCGQUANT     ABGs: No results found for: PHART, PO2ART, WBB3DQD, KXQ8FWI, BEART, H6SMKSAT     Type & Screen (If Applicable):  No results found for: LABABO, LABRH    Drug/Infectious Status (If Applicable):  No results found for: HIV, HEPCAB    COVID-19 Screening (If Applicable):   Lab Results   Component Value Date/Time    COVID19 Not Detected 04/28/2022 12:00 PM           Anesthesia Evaluation  Patient summary reviewed  Airway: Mallampati: I  TM distance: >3 FB   Neck ROM: full  Mouth opening: > = 3 FB   Dental: normal exam         Pulmonary: breath sounds clear to auscultation      Smoker: Former smoker. ROS comment: Hx COVID     Cardiovascular:    (+) hypertension:, CAD:, hyperlipidemia        Rhythm: regular  Rate: normal           Beta Blocker:  Dose within 24 Hrs         Neuro/Psych:   (+) psychiatric history: stable with treatmentdepression/anxiety             GI/Hepatic/Renal:   (+) hiatal hernia, GERD:,          ROS comment: BPH  IBS  .    Endo/Other:    (+) blood dyscrasia (11.8/37): anemia:., .          Pt had no PAT visit       Abdominal:             Vascular: Other Findings:           Anesthesia Plan      general     ASA 3         continuous noninvasive hemodynamic monitor  MIPS: Postoperative opioids intended and Prophylactic antiemetics administered. Anesthetic plan and risks discussed with patient. Attending anesthesiologist reviewed and agrees with Preprocedure content                [place] Castillo - CRNA   10/24/2022    DOS STAFF ADDENDUM:    Pt seen and examined, chart reviewed (including anesthesia, drug and allergy history). Anesthetic plan, risks, benefits, alternatives, and personnel involved discussed with patient. Patient verbalized an understanding and agrees to proceed. Plan discussed with care team members and agreed upon.     Dilcia Hernandez MD  Staff Anesthesiologist  7:52 AM

## 2022-10-25 ENCOUNTER — ANESTHESIA (OUTPATIENT)
Dept: OPERATING ROOM | Age: 87
End: 2022-10-25
Payer: MEDICARE

## 2022-10-25 ENCOUNTER — HOSPITAL ENCOUNTER (OUTPATIENT)
Age: 87
Setting detail: OUTPATIENT SURGERY
Discharge: HOME | End: 2022-10-25
Attending: SURGERY | Admitting: SURGERY
Payer: MEDICARE

## 2022-10-25 VITALS
SYSTOLIC BLOOD PRESSURE: 133 MMHG | TEMPERATURE: 97.4 F | RESPIRATION RATE: 16 BRPM | HEART RATE: 76 BPM | DIASTOLIC BLOOD PRESSURE: 69 MMHG | OXYGEN SATURATION: 94 %

## 2022-10-25 DIAGNOSIS — G89.18 POSTOPERATIVE PAIN: Primary | ICD-10-CM

## 2022-10-25 PROCEDURE — 2720000010 HC SURG SUPPLY STERILE: Performed by: SURGERY

## 2022-10-25 PROCEDURE — S2900 ROBOTIC SURGICAL SYSTEM: HCPCS | Performed by: SURGERY

## 2022-10-25 PROCEDURE — 6370000000 HC RX 637 (ALT 250 FOR IP): Performed by: SURGERY

## 2022-10-25 PROCEDURE — 3700000000 HC ANESTHESIA ATTENDED CARE: Performed by: SURGERY

## 2022-10-25 PROCEDURE — 2500000003 HC RX 250 WO HCPCS

## 2022-10-25 PROCEDURE — 49650 LAP ING HERNIA REPAIR INIT: CPT | Performed by: SURGERY

## 2022-10-25 PROCEDURE — 7100000001 HC PACU RECOVERY - ADDTL 15 MIN: Performed by: SURGERY

## 2022-10-25 PROCEDURE — C1781 MESH (IMPLANTABLE): HCPCS | Performed by: SURGERY

## 2022-10-25 PROCEDURE — 7100000000 HC PACU RECOVERY - FIRST 15 MIN: Performed by: SURGERY

## 2022-10-25 PROCEDURE — 3600000009 HC SURGERY ROBOT BASE: Performed by: SURGERY

## 2022-10-25 PROCEDURE — 6360000002 HC RX W HCPCS: Performed by: SURGERY

## 2022-10-25 PROCEDURE — 3600000019 HC SURGERY ROBOT ADDTL 15MIN: Performed by: SURGERY

## 2022-10-25 PROCEDURE — 6360000002 HC RX W HCPCS

## 2022-10-25 PROCEDURE — 2580000003 HC RX 258: Performed by: SURGERY

## 2022-10-25 PROCEDURE — 2500000003 HC RX 250 WO HCPCS: Performed by: SURGERY

## 2022-10-25 PROCEDURE — 3700000001 HC ADD 15 MINUTES (ANESTHESIA): Performed by: SURGERY

## 2022-10-25 PROCEDURE — 7100000011 HC PHASE II RECOVERY - ADDTL 15 MIN: Performed by: SURGERY

## 2022-10-25 PROCEDURE — 7100000010 HC PHASE II RECOVERY - FIRST 15 MIN: Performed by: SURGERY

## 2022-10-25 PROCEDURE — 2709999900 HC NON-CHARGEABLE SUPPLY: Performed by: SURGERY

## 2022-10-25 DEVICE — MESH CS LEFT EXTRA-LARGE 12CM X 17CM: Type: IMPLANTABLE DEVICE | Site: GROIN | Status: FUNCTIONAL

## 2022-10-25 RX ORDER — NEOSTIGMINE METHYLSULFATE 1 MG/ML
INJECTION, SOLUTION INTRAVENOUS PRN
Status: DISCONTINUED | OUTPATIENT
Start: 2022-10-25 | End: 2022-10-25 | Stop reason: SDUPTHER

## 2022-10-25 RX ORDER — SODIUM CHLORIDE 0.9 % (FLUSH) 0.9 %
5-40 SYRINGE (ML) INJECTION PRN
Status: DISCONTINUED | OUTPATIENT
Start: 2022-10-25 | End: 2022-10-25 | Stop reason: HOSPADM

## 2022-10-25 RX ORDER — TAMSULOSIN HYDROCHLORIDE 0.4 MG/1
0.4 CAPSULE ORAL ONCE
Status: COMPLETED | OUTPATIENT
Start: 2022-10-25 | End: 2022-10-25

## 2022-10-25 RX ORDER — KETOROLAC TROMETHAMINE 30 MG/ML
15 INJECTION, SOLUTION INTRAMUSCULAR; INTRAVENOUS
Status: DISCONTINUED | OUTPATIENT
Start: 2022-10-25 | End: 2022-10-25 | Stop reason: HOSPADM

## 2022-10-25 RX ORDER — KETOROLAC TROMETHAMINE 30 MG/ML
15 INJECTION, SOLUTION INTRAMUSCULAR; INTRAVENOUS ONCE
Status: COMPLETED | OUTPATIENT
Start: 2022-10-25 | End: 2022-10-25

## 2022-10-25 RX ORDER — LIDOCAINE HYDROCHLORIDE 20 MG/ML
INJECTION, SOLUTION EPIDURAL; INFILTRATION; INTRACAUDAL; PERINEURAL PRN
Status: DISCONTINUED | OUTPATIENT
Start: 2022-10-25 | End: 2022-10-25 | Stop reason: SDUPTHER

## 2022-10-25 RX ORDER — METHOCARBAMOL 500 MG/1
1000 TABLET, FILM COATED ORAL 4 TIMES DAILY
Status: DISCONTINUED | OUTPATIENT
Start: 2022-10-25 | End: 2022-10-25 | Stop reason: HOSPADM

## 2022-10-25 RX ORDER — MIDAZOLAM HYDROCHLORIDE 1 MG/ML
2 INJECTION INTRAMUSCULAR; INTRAVENOUS
Status: DISCONTINUED | OUTPATIENT
Start: 2022-10-25 | End: 2022-10-25 | Stop reason: HOSPADM

## 2022-10-25 RX ORDER — FENTANYL CITRATE 50 UG/ML
INJECTION, SOLUTION INTRAMUSCULAR; INTRAVENOUS PRN
Status: DISCONTINUED | OUTPATIENT
Start: 2022-10-25 | End: 2022-10-25 | Stop reason: SDUPTHER

## 2022-10-25 RX ORDER — ROCURONIUM BROMIDE 10 MG/ML
INJECTION, SOLUTION INTRAVENOUS PRN
Status: DISCONTINUED | OUTPATIENT
Start: 2022-10-25 | End: 2022-10-25 | Stop reason: SDUPTHER

## 2022-10-25 RX ORDER — SODIUM CHLORIDE 9 MG/ML
INJECTION, SOLUTION INTRAVENOUS PRN
Status: DISCONTINUED | OUTPATIENT
Start: 2022-10-25 | End: 2022-10-25 | Stop reason: HOSPADM

## 2022-10-25 RX ORDER — BUPIVACAINE HYDROCHLORIDE AND EPINEPHRINE 2.5; 5 MG/ML; UG/ML
INJECTION, SOLUTION EPIDURAL; INFILTRATION; INTRACAUDAL; PERINEURAL PRN
Status: DISCONTINUED | OUTPATIENT
Start: 2022-10-25 | End: 2022-10-25 | Stop reason: ALTCHOICE

## 2022-10-25 RX ORDER — ONDANSETRON 4 MG/1
4 TABLET, ORALLY DISINTEGRATING ORAL EVERY 8 HOURS PRN
Qty: 20 TABLET | Refills: 1 | Status: SHIPPED | OUTPATIENT
Start: 2022-10-25

## 2022-10-25 RX ORDER — MORPHINE SULFATE 2 MG/ML
2 INJECTION, SOLUTION INTRAMUSCULAR; INTRAVENOUS EVERY 5 MIN PRN
Status: DISCONTINUED | OUTPATIENT
Start: 2022-10-25 | End: 2022-10-25 | Stop reason: HOSPADM

## 2022-10-25 RX ORDER — GLYCOPYRROLATE 0.2 MG/ML
INJECTION INTRAMUSCULAR; INTRAVENOUS PRN
Status: DISCONTINUED | OUTPATIENT
Start: 2022-10-25 | End: 2022-10-25 | Stop reason: SDUPTHER

## 2022-10-25 RX ORDER — IPRATROPIUM BROMIDE AND ALBUTEROL SULFATE 2.5; .5 MG/3ML; MG/3ML
1 SOLUTION RESPIRATORY (INHALATION)
Status: DISCONTINUED | OUTPATIENT
Start: 2022-10-25 | End: 2022-10-25 | Stop reason: HOSPADM

## 2022-10-25 RX ORDER — ONDANSETRON 2 MG/ML
INJECTION INTRAMUSCULAR; INTRAVENOUS PRN
Status: DISCONTINUED | OUTPATIENT
Start: 2022-10-25 | End: 2022-10-25 | Stop reason: SDUPTHER

## 2022-10-25 RX ORDER — DIPHENHYDRAMINE HYDROCHLORIDE 50 MG/ML
12.5 INJECTION INTRAMUSCULAR; INTRAVENOUS
Status: DISCONTINUED | OUTPATIENT
Start: 2022-10-25 | End: 2022-10-25 | Stop reason: HOSPADM

## 2022-10-25 RX ORDER — LABETALOL HYDROCHLORIDE 5 MG/ML
10 INJECTION, SOLUTION INTRAVENOUS
Status: DISCONTINUED | OUTPATIENT
Start: 2022-10-25 | End: 2022-10-25 | Stop reason: HOSPADM

## 2022-10-25 RX ORDER — DEXAMETHASONE SODIUM PHOSPHATE 10 MG/ML
INJECTION, SOLUTION INTRAMUSCULAR; INTRAVENOUS PRN
Status: DISCONTINUED | OUTPATIENT
Start: 2022-10-25 | End: 2022-10-25 | Stop reason: SDUPTHER

## 2022-10-25 RX ORDER — TAMSULOSIN HYDROCHLORIDE 0.4 MG/1
0.4 CAPSULE ORAL DAILY
Qty: 15 CAPSULE | Refills: 0 | Status: SHIPPED | OUTPATIENT
Start: 2022-10-25 | End: 2022-11-09

## 2022-10-25 RX ORDER — PROCHLORPERAZINE EDISYLATE 5 MG/ML
5 INJECTION INTRAMUSCULAR; INTRAVENOUS
Status: DISCONTINUED | OUTPATIENT
Start: 2022-10-25 | End: 2022-10-25 | Stop reason: HOSPADM

## 2022-10-25 RX ORDER — ONDANSETRON 2 MG/ML
4 INJECTION INTRAMUSCULAR; INTRAVENOUS
Status: DISCONTINUED | OUTPATIENT
Start: 2022-10-25 | End: 2022-10-25 | Stop reason: HOSPADM

## 2022-10-25 RX ORDER — DEXAMETHASONE SODIUM PHOSPHATE 4 MG/ML
INJECTION, SOLUTION INTRA-ARTICULAR; INTRALESIONAL; INTRAMUSCULAR; INTRAVENOUS; SOFT TISSUE PRN
Status: DISCONTINUED | OUTPATIENT
Start: 2022-10-25 | End: 2022-10-25

## 2022-10-25 RX ORDER — SODIUM CHLORIDE 0.9 % (FLUSH) 0.9 %
5-40 SYRINGE (ML) INJECTION EVERY 12 HOURS SCHEDULED
Status: DISCONTINUED | OUTPATIENT
Start: 2022-10-25 | End: 2022-10-25 | Stop reason: HOSPADM

## 2022-10-25 RX ORDER — EPHEDRINE SULFATE/0.9% NACL/PF 50 MG/5 ML
SYRINGE (ML) INTRAVENOUS PRN
Status: DISCONTINUED | OUTPATIENT
Start: 2022-10-25 | End: 2022-10-25 | Stop reason: SDUPTHER

## 2022-10-25 RX ORDER — SODIUM CHLORIDE 9 MG/ML
INJECTION, SOLUTION INTRAVENOUS CONTINUOUS
Status: DISCONTINUED | OUTPATIENT
Start: 2022-10-25 | End: 2022-10-25 | Stop reason: HOSPADM

## 2022-10-25 RX ORDER — MIDAZOLAM HYDROCHLORIDE 1 MG/ML
INJECTION INTRAMUSCULAR; INTRAVENOUS PRN
Status: DISCONTINUED | OUTPATIENT
Start: 2022-10-25 | End: 2022-10-25 | Stop reason: SDUPTHER

## 2022-10-25 RX ORDER — FENTANYL CITRATE 50 UG/ML
25 INJECTION, SOLUTION INTRAMUSCULAR; INTRAVENOUS EVERY 5 MIN PRN
Status: DISCONTINUED | OUTPATIENT
Start: 2022-10-25 | End: 2022-10-25 | Stop reason: HOSPADM

## 2022-10-25 RX ORDER — KETOROLAC TROMETHAMINE 15 MG/ML
INJECTION, SOLUTION INTRAMUSCULAR; INTRAVENOUS
Status: DISCONTINUED
Start: 2022-10-25 | End: 2022-10-25 | Stop reason: HOSPADM

## 2022-10-25 RX ORDER — TRAMADOL HYDROCHLORIDE 50 MG/1
50 TABLET ORAL EVERY 4 HOURS PRN
Qty: 18 TABLET | Refills: 0 | Status: SHIPPED | OUTPATIENT
Start: 2022-10-25 | End: 2022-10-28

## 2022-10-25 RX ORDER — HYDRALAZINE HYDROCHLORIDE 20 MG/ML
10 INJECTION INTRAMUSCULAR; INTRAVENOUS
Status: DISCONTINUED | OUTPATIENT
Start: 2022-10-25 | End: 2022-10-25 | Stop reason: HOSPADM

## 2022-10-25 RX ORDER — PROPOFOL 10 MG/ML
INJECTION, EMULSION INTRAVENOUS PRN
Status: DISCONTINUED | OUTPATIENT
Start: 2022-10-25 | End: 2022-10-25 | Stop reason: SDUPTHER

## 2022-10-25 RX ORDER — DOCUSATE SODIUM 100 MG/1
100 CAPSULE, LIQUID FILLED ORAL 2 TIMES DAILY
Qty: 30 CAPSULE | Refills: 0 | Status: SHIPPED | OUTPATIENT
Start: 2022-10-25 | End: 2022-11-09

## 2022-10-25 RX ADMIN — LIDOCAINE HYDROCHLORIDE 40 MG: 20 INJECTION, SOLUTION EPIDURAL; INFILTRATION; INTRACAUDAL; PERINEURAL at 08:34

## 2022-10-25 RX ADMIN — SODIUM CHLORIDE: 900 INJECTION, SOLUTION INTRAVENOUS at 08:32

## 2022-10-25 RX ADMIN — CEFAZOLIN 2000 MG: 2 INJECTION, POWDER, FOR SOLUTION INTRAMUSCULAR; INTRAVENOUS at 07:35

## 2022-10-25 RX ADMIN — LIDOCAINE HYDROCHLORIDE 60 MG: 20 INJECTION, SOLUTION EPIDURAL; INFILTRATION; INTRACAUDAL; PERINEURAL at 07:38

## 2022-10-25 RX ADMIN — FENTANYL CITRATE 50 MCG: 50 INJECTION, SOLUTION INTRAMUSCULAR; INTRAVENOUS at 08:12

## 2022-10-25 RX ADMIN — SODIUM CHLORIDE: 900 INJECTION, SOLUTION INTRAVENOUS at 06:26

## 2022-10-25 RX ADMIN — PROPOFOL 40 MG: 10 INJECTION, EMULSION INTRAVENOUS at 08:34

## 2022-10-25 RX ADMIN — FENTANYL CITRATE 100 MCG: 50 INJECTION, SOLUTION INTRAMUSCULAR; INTRAVENOUS at 07:37

## 2022-10-25 RX ADMIN — GLYCOPYRROLATE 0.2 MG: 0.2 INJECTION, SOLUTION INTRAMUSCULAR; INTRAVENOUS at 07:47

## 2022-10-25 RX ADMIN — ROCURONIUM BROMIDE 35 MG: 10 INJECTION, SOLUTION INTRAVENOUS at 07:41

## 2022-10-25 RX ADMIN — DEXAMETHASONE SODIUM PHOSPHATE 10 MG: 10 INJECTION INTRAMUSCULAR; INTRAVENOUS at 07:51

## 2022-10-25 RX ADMIN — TAMSULOSIN HYDROCHLORIDE 0.4 MG: 0.4 CAPSULE ORAL at 12:17

## 2022-10-25 RX ADMIN — Medication 10 MG: at 07:52

## 2022-10-25 RX ADMIN — ONDANSETRON 4 MG: 2 INJECTION INTRAMUSCULAR; INTRAVENOUS at 08:29

## 2022-10-25 RX ADMIN — GLYCOPYRROLATE 0.6 MG: 0.2 INJECTION, SOLUTION INTRAMUSCULAR; INTRAVENOUS at 08:33

## 2022-10-25 RX ADMIN — KETOROLAC TROMETHAMINE 15 MG: 30 INJECTION, SOLUTION INTRAMUSCULAR at 12:50

## 2022-10-25 RX ADMIN — SUGAMMADEX 250 MG: 100 INJECTION, SOLUTION INTRAVENOUS at 08:38

## 2022-10-25 RX ADMIN — Medication 3 MG: at 08:33

## 2022-10-25 RX ADMIN — PROPOFOL 120 MG: 10 INJECTION, EMULSION INTRAVENOUS at 07:39

## 2022-10-25 RX ADMIN — MIDAZOLAM 1 MG: 1 INJECTION INTRAMUSCULAR; INTRAVENOUS at 07:29

## 2022-10-25 ASSESSMENT — PAIN SCALES - GENERAL
PAINLEVEL_OUTOF10: 0
PAINLEVEL_OUTOF10: 0
PAINLEVEL_OUTOF10: 2

## 2022-10-25 ASSESSMENT — PAIN - FUNCTIONAL ASSESSMENT: PAIN_FUNCTIONAL_ASSESSMENT: NONE - DENIES PAIN

## 2022-10-25 ASSESSMENT — PAIN DESCRIPTION - LOCATION: LOCATION: ABDOMEN

## 2022-10-25 ASSESSMENT — PAIN DESCRIPTION - DESCRIPTORS: DESCRIPTORS: ACHING

## 2022-10-25 NOTE — DISCHARGE INSTRUCTIONS
do not go 2-3 days without having a bowel movement. Magnesium citrate is available over the counter if you are feeling constipated. Milk of magnesia is another alternative and available in smaller dosing over the counter as well. Pain medications; Ibuprofen should be taken 800mg oral every 6 hrs with food for first 3 days postop. This decreases swelling and helps with pain control. Percocet- take at least 1/2 pill every 4 hours the first 36 hours after surgery, and may take as many as 2 pills every 6 hours for severe pain. After the first 36hours only take the pills as needed and stop them as soon as possible. Pain meds cause constipation so pay close attention to the \"bowels\" topic above. Keep incisions clean and dry. Vicodin/Percocet and ibuprofen for pain as prescribed. Okay to resume anticoagulant medication after 24hrs. Do not exceed 4000mg of Tylenol/Acetaminophen per day. Vicodin/Norco/Percocet contain acetaminophen. Do not take additional amounts of Tylenol if you are taking these medications. You are likely to have pain for the next few days. You may also feel like you have the flu, and you may have a low fever and feel tired and nauseated. This is common. You should feel better after a few days and will probably feel much better in 7 days. For several weeks you may feel twinges or pulling in the hernia repair when you move. You may have some bruising on the scrotum and along the penis. This is normal.     Men will need to wear a jockstrap or briefs, NOT BOXERS, for scrotal support for several days after a groin (inguinal) hernia repair. Blackwave bicycle shorts may provide good support. This care sheet gives you a general idea about how long it will take for you to recover. But each person recovers at a different pace. Follow the steps below to get better as quickly as possible. Activity  Rest when you feel tired. Getting enough sleep will help you recover.   Try to walk each day. Start by walking a little more than you did the day before. Bit by bit, increase the amount you walk. Walking boosts blood flow and helps prevent pneumonia and constipation. Avoid strenuous activities, such as biking, jogging, weight lifting, or aerobic exercise, until your doctor says it is okay. Avoid lifting anything that would make you strain. This may include heavy grocery bags and milk containers, a heavy briefcase or backpack, cat litter or dog food bags, a vacuum , or a child. You may drive when you are no longer taking pain medicine and can quickly move your foot from the gas pedal to the brake. You must also be able to sit comfortably for a long period of time, even if you do not plan to go far. You might get caught in traffic. Most people are able to return to work within 1 to 2 weeks after surgery. You may shower 24 to 48 hours after surgery, if your doctor okays it. Pat the cut (incision) dry. Do not take a bath for the first 2 weeks, or until your doctor tells you it is okay. Your doctor will tell you when you can have sex again. Diet  You can eat your normal diet. If your stomach is upset, try bland, low-fat foods like plain rice, broiled chicken, toast, and yogurt. Drink plenty of fluids (unless your doctor tells you not to). You may notice that your bowel movements are not regular right after your surgery. This is common. Avoid constipation and straining with bowel movements. You may want to take a fiber supplement every day. If you have not had a bowel movement after a couple of days, ask your doctor about taking a mild laxative. Medicines  Be safe with medicines. Take pain medicines exactly as directed. If the doctor gave you a prescription medicine for pain, take it as prescribed. If you are not taking a prescription pain medicine, take an over-the-counter medicine such as acetaminophen (Tylenol), ibuprofen (Advil, Motrin), or naproxen (Aleve).  Read and follow all instructions on the label. Do not take two or more pain medicines at the same time unless the doctor told you to. Many pain medicines have acetaminophen, which is Tylenol. Too much acetaminophen (Tylenol) can be harmful. If your doctor prescribed antibiotics, take them as directed. Do not stop taking them just because you feel better. You need to take the full course of antibiotics. If you think your pain medicine is making you sick to your stomach: Take your medicine after meals (unless your doctor has told you not to). Ask your doctor for a different pain medicine. Incision care  If you have strips of tape on the cut (incision) the doctor made, leave the tape on for a week or until it falls off. If you have staples closing the cut, you will need to visit your doctor in 1 to 2 weeks to have them removed. Wash the area daily with warm, soapy water and pat it dry. Follow-up care is a key part of your treatment and safety. Be sure to make and go to all appointments, and call your doctor if you are having problems. It's also a good idea to know your test results and keep a list of the medicines you take. When should you call for help? Call 911 anytime you think you may need emergency care. For example, call if:  You passed out (lost consciousness). You have sudden chest pain and shortness of breath, or you cough up blood. You have severe pain in your belly. Call your doctor now or seek immediate medical care if:  You are sick to your stomach and cannot keep fluids down. You have signs of a blood clot, such as:  Pain in your calf, back of the knee, thigh, or groin. Redness and swelling in your leg or groin. You have trouble passing urine or stool, especially if you have mild pain or swelling in your lower belly. Bright red blood has soaked through the bandage over your incision. Watch closely for any changes in your health, and be sure to contact your doctor if:  Your swelling is getting worse. Your swelling is not going down.

## 2022-10-25 NOTE — H&P
General Surgery History and Physical  [facility]  10/25/2022    Interval H&P  No events since last being seen in the office by Dr. Bethanne Siemens. RACHEL Patricio is a 80 y.o. male who presents for evaluation of left inguinal hernia. Timing is intermittent, radiation to left groin left abdominal wall, alleviated by none and started months ago and severity is 8/10. States pain has been worsening, no symptoms of bowel obstruction, nausea, vomiting, fever, chills, abdominal pain. States it protrudes with activity, it is reducible. Patient with a history of COVID in January 2022 and since then has had significant health complications resulting from the hospital stay. Complain of some lower abdominal pain and left groin pain. He states he does have a bulge in the area and he did not associate this with the hernia for most of our discussion until I showed it to them. Patient also has known history of a very large paraesophageal hernia for which she is relatively asymptomatic and denies any bloating nausea regurgitation of food. He did have some weight loss during his hospitalization for COVID but he seems to have significantly improved from this. Follow-up with increasing symptomatology increasing bloating constipation and left lower quadrant pain.            Past Medical History:   Diagnosis Date    Acute basilic vein thrombosis, right 05/03/2022    Acute pancreatitis     6/06 AND 11/09    Anxiety     BPH (benign prostatic hyperplasia)     CAD (coronary artery disease)     COVID-19     GERD (gastroesophageal reflux disease)     Hyperlipidemia     Obsessive compulsive disorder     Psoriasis        Past Surgical History:   Procedure Laterality Date    CATARACT REMOVAL Bilateral 08/24/2017    COLONOSCOPY  03/14/2017    THORACIC AORTIC ANEURYSM REPAIR N/A 9/8/2022    THORACIC AORTIC ANEURYSM REPAIR ENDOVASCULAR performed by Samina Davis MD at [address] [place] [address] GASTROINTESTINAL ENDOSCOPY  03/14/2017       Medications Prior to Admission    Prior to Admission medications    Medication Sig Start Date End Date Taking? Authorizing Provider   rosuvastatin (CRESTOR) 10 MG tablet TAKE 1 TABLET BY MOUTH AT  NIGHT 10/18/22   Jesus Argueta,    esomeprazole (NEXIUM) 40 MG delayed release capsule Take 40 mg by mouth every morning (before breakfast) 10/12/22   Historical Provider, MD   metoprolol tartrate (LOPRESSOR) 50 MG tablet Take 50 mg by mouth daily 9/25/22   Historical Provider, MD   amLODIPine (NORVASC) 10 MG tablet Take 1 tablet by mouth daily 9/20/22 10/25/22  Jesus Kenney,    mirtazapine (REMERON) 15 MG tablet Take 1 tablet by mouth nightly 9/20/22   Jesus Kenney, DO   PARoxetine (PAXIL) 20 MG tablet ONE DAILY 9/14/22   Jesus Kenney,    furosemide (LASIX) 20 MG tablet Take 1 tablet by mouth in the morning.  8/5/22   Socorro Vasquez DO   colesevelam (WELCHOL) 625 MG tablet Take 1 tablet by mouth 2 times daily (with meals) 5/23/22   Jesus Kenney,    ferrous sulfate (IRON 325) 325 (65 Fe) MG tablet Take 1 tablet by mouth daily (with breakfast) 5/23/22   Jesus Kenney DO   metoprolol (LOPRESSOR) 100 MG tablet Take 1 tablet by mouth 3 times daily  Patient taking differently: Take 50 mg by mouth nightly 5/23/22   Socorro Vasquez DO   lisinopril (PRINIVIL;ZESTRIL) 20 MG tablet Take 1 tablet by mouth daily 5/23/22   Socorro Vasquez DO   aspirin 81 MG chewable tablet Take 1 tablet by mouth daily 5/3/22   Katheryne Prader, MD   Bismuth Subsalicylate (PEPTO-BISMOL PO) Take by mouth as needed    Historical Provider, MD   Multiple Vitamins-Minerals (VITEYES AREDS FORMULA/LUTEIN PO) Take by mouth Daily    Historical Provider, MD       No Known Allergies    Family History   Problem Relation Age of Onset    Asthma Mother     Coronary Art Dis Mother     Parkinsonism Father        Social History     Tobacco Use    Smoking status: Former     Types: Cigarettes Quit date: 56     Years since quittin.8    Smokeless tobacco: Never   Substance Use Topics    Alcohol use: Yes     Comment: OCCASIONAL BEER    Drug use: No         Review of Systems: pertinent ROS listed in HPI, all others negative       PHYSICAL EXAM:    Vitals:    10/25/22 0618   BP: (!) 147/67   Pulse: 53   Resp: 16   Temp: 98 °F (36.7 °C)   SpO2: 97%       GENERAL:  NAD. A&Ox3. HEAD:  Normocephalic. Atraumatic. EYES:   No scleral icterus. PERRL. LUNGS:  No increased work of breathing. CARDIOVASCULAR: RR  ABDOMEN:  Soft, non-distended, non-tender. No guarding, rigidity, rebound. : Partially reducible left inguinal hernia  EXTREMITIES:   MAEx4. Atraumatic. No LE edema. SKIN:  Warm and dry        ASSESSMENT/PLAN:  80 y.o. male with with symptomatic left inguinal hernia    Laparoscopic robotic assisted left inguinal hernia repair with mesh. Risk first benefits were discussed with the patient he agrees to proceed with procedure. Plan will be discussed with Dr. Logan Craven.

## 2022-10-25 NOTE — OP NOTE
Operative Note  Wilfred Forbes MD, MS Morenita Carroll  MRN: [id number]  DATE OF PROCEDURE: 10/25/2022    PREOPERATIVE DIAGNOSIS: Symptomatic left inguinal hernia. POSTOPERATIVE DIAGNOSIS: Symptomatic left inguinal hernia. PROCEDURE: Laparoscopic robotic assisted left inguinal hernia repair with mesh. ANESTHESIA: General endotracheal.     SURGEON: Keagan Man MD     ASSISTANT: MD Mike;Ananya Loza Record, First Assist     COMPLICATIONS: None. ESTIMATED BLOOD LOSS: Minimal.     FLUIDS: Crystalloid. SPECIMENS: none. Implant Name Type Inv. Item Serial No.  Lot No. LRB No. Used Action   MESH CS LEFT EXTRA-LARGE 12CM X 17CM - [id number]  MESH CS LEFT EXTRA-LARGE 12CM X 17CM  BARD DAVOL-WD [id number] Left 1 Implanted        INDICATIONS: Morenita Carroll is a 80 y.o. male with symptomatic inguinal hernia. On physical exam, they had a palpable left inguinal hernia. After being explained the risks, benefits, and alternatives of procedure, including but not limited to bleeding, scar, infection, recurrence they agreed to proceed. DESCRIPTION OF PROCEDURE: They were taken to the operating room, placed supine and administered general anesthesia and intubated. Once the airway was secured and they were adequately sedated, they were prepped and draped in the normal sterile fashion. Timeout was performed to confirm surgical site and the patient's name. They received preoperative antibiotics in the form of IV. We initially made an 8-mm incision superior to the umbilicus, inserted a Veress needle, confirmed the needle placement and insufflated to 15 mmHg. We then removed the Veress needle, inserted an 8-mm trocar, inserted the camera and, following, inspected the abdomen. Upon entrance into the abdomen, we identified left large indirect hernia with sigmoid colon.  The patient was placed in steep Trendelenburg position and two more 8 mm trocars, 1 was inserted in the right lateral abdomen and 1 in the left lateral abdomen. The robot was then docked over the left side of the patient. We used electrocautery and scissors to dissect the preperitoneal space creating unilateral peritoneal inguinal flaps. We started on the left side and dissected out the cord structures and completely dissected out the hernia sac reducing it from the inguinal canal. There was a large indirect hernia sac with small associated cord lipoma, which was completely reduced from the inguinal canal. We completely exposed the pubic tubercle and Maco's ligament. The entire myopectineal orifice was then visualized we could appreciate any laxity or defects in the indirect, direct, femoral and obturator spaces. We surrounded the cord structures and skeletonized them. 3grams of Duane descicant was placed in the inguinal canal to minimize seroma formation. We then inserted a 17 cm x 12 cm piece of 3D max Midweight XL placed in the inguinal canal and unrolled it. It self adhered to the inguinal canal overlapping Maco's ligament with at least 2cm overlap. It was completely unfolded and it secured itself in place. Surgicel snow was placed in the indirect space prior to closing the peritoneum and applying the mesh to minimize seroma. We then closed the preperitoneal space with running 6 inch V-Loc suture. We then removed both of the needles from the V-Loc sutures, decompressed the abdomen and removed each one of our trocars. We performed inguinal block with 0.25% Marcaine 10 mL. We then reapproximated each one of the skin incisions using 4-0 Monocryl suture. SurgiGlue was placed over the top. The patient was awoken, extubated and transferred to the postoperative care unit in stable condition. All instrument counts, lap counts, and needle counts were correct at the completion of the procedure.     Morenita Rodriguez MD, MS  Minimally Invasive and Bariatric Surgery  [phone] (p)  10/25/2022  8:19 AM

## 2022-10-25 NOTE — PROGRESS NOTES
Niece here, pt discussed discharge with her and he wants to go home. Family will stay with the patient at his home per niece. Discharge instructions reviewed including follow up and returning to ER if unable to void. Pt and niece voice understanding. Pt is voiding  cc at a time, does not feel discomfort. Prescriptions delivered from the pharmacy, ice packs and urinals given. Perfect serve message/update sent to Dr. Miriam Mandujano.

## 2022-10-25 NOTE — PROGRESS NOTES
1000 urge to void unable to void after 20 min assisted up to sitting postiion  1030 unable to void assisted to standing position  1100 dozing easy to arouse. Denies pain taking ice chips warm tea offered taking sips  1200 urge to void assisted up to bathroom.  Walks without difficulty  1230 niece at bedside responding well

## 2022-10-25 NOTE — ANESTHESIA POSTPROCEDURE EVALUATION
Department of Anesthesiology  Postprocedure Note    Patient: Mike Patricio  MRN: [id number]  YOB: 1935  Date of evaluation: 10/25/2022      Procedure Summary     Date: 10/25/22 Room / Location: [address] [place] [address] 06 / [facility]    Anesthesia Start: 4727 Anesthesia Stop: 8671    Procedure: LAPAROSCOPIC ROBOTIC XI LEFT INGUINAL HERNIA REPAIR WITH MESH (Left: Abdomen) Diagnosis:       Left inguinal hernia      (Left inguinal hernia [K40.90])    Surgeons: Fiona Roa MD Responsible Provider: Abby Mckay MD    Anesthesia Type: general ASA Status: 3          Anesthesia Type: No value filed.     Sumanth Phase I: Sumanth Score: 10    Sumanth Phase II:        Anesthesia Post Evaluation    Patient location during evaluation: PACU  Patient participation: complete - patient participated  Level of consciousness: awake  Airway patency: patent  Nausea & Vomiting: no nausea and no vomiting  Complications: no  Cardiovascular status: hemodynamically stable and blood pressure returned to baseline  Respiratory status: acceptable  Hydration status: euvolemic

## 2022-10-25 NOTE — PROGRESS NOTES
Ambulated to the bathroom, voided approx 75 cc in urinal. Wants to speak with family re:discharge vs admission. Perfect serve to Dr. Shari Tolentino with update.

## 2022-10-27 NOTE — PROGRESS NOTES
CLINICAL PHARMACY NOTE: MEDS TO BEDS    Total # of Prescriptions Filled: 3   The following medications were delivered to the patient:  Ondansetron 4 mg odt  Tramadol 50 mg  Tamsulosin 0.4 mg    Additional Documentation:   Colace not covered, cheaper to buy over the counter

## 2022-11-09 ENCOUNTER — OFFICE VISIT (OUTPATIENT)
Dept: SURGERY | Age: 87
End: 2022-11-09

## 2022-11-09 VITALS
SYSTOLIC BLOOD PRESSURE: 128 MMHG | TEMPERATURE: 97.7 F | DIASTOLIC BLOOD PRESSURE: 67 MMHG | WEIGHT: 163 LBS | HEIGHT: 70 IN | HEART RATE: 65 BPM | BODY MASS INDEX: 23.34 KG/M2

## 2022-11-09 DIAGNOSIS — K40.90 LEFT INGUINAL HERNIA: Primary | ICD-10-CM

## 2022-11-09 PROCEDURE — 99024 POSTOP FOLLOW-UP VISIT: CPT | Performed by: SURGERY

## 2022-11-09 NOTE — PROGRESS NOTES
General Surgery Office Note  Allison Mendoza MD, MS    Patient's Name/Date of Birth: Anam Beard / 1935    Date: November 9, 2022     Chief compaint: Postop visit from laparoscopic robot assisted left inguinal hernia repair with mesh    Surgeon: Carito Nascimento MD    Patient Active Problem List   Diagnosis    Psoriasis    BPH (benign prostatic hyperplasia)    CAD (coronary artery disease)    Obsessive neurosis    Essential hypertension    Irritable bowel syndrome with diarrhea    Hiatal hernia with GERD    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[address]    Moderate protein-calorie malnutrition ([address]       Subjective: Doing well, no new complaints, pain improving, ambulating well, bowel function improving and off pain medication    Objective:  /67   Pulse 65   Temp 97.7 °F (36.5 °C)   Ht 5' 10\" (1.778 m)   Wt 163 lb (73.9 kg)   BMI 23.39 kg/m²   Labs:  No results for input(s): WBC, HGB, HCT in the last 72 hours. Invalid input(s): PLR  Lab Results   Component Value Date    CREATININE 1.0 10/21/2022    BUN 17 10/21/2022     10/21/2022    K 3.9 10/21/2022     10/21/2022    CO2 25 10/21/2022     No results for input(s): LIPASE, AMYLASE in the last 72 hours.       Review of Systems -  General ROS: negative for - chills, fatigue or malaise  ENT ROS: negative for - hearing change, nasal congestion or nasal discharge  Allergy and Immunology ROS: negative for - hives, itchy/watery eyes or nasal congestion  Hematological and Lymphatic ROS: negative for - blood clots, blood transfusions, bruising or fatigue  Endocrine ROS: negative for - malaise/lethargy, mood swings, palpitations or polydipsia/polyuria  Respiratory ROS: negative for - sputum changes, stridor, tachypnea or wheezing  Cardiovascular ROS: negative for - irregular heartbeat, loss of consciousness, murmur or orthopnea  Gastrointestinal ROS: negative for - diarrhea, or hematemesis  Genito-Urinary ROS: negative for - genital discharge, genital ulcers or hematuria  Musculoskeletal ROS: negative for - gait disturbance, muscle pain or muscular weakness  Psych/Neuro ROS: negative for - visual or auditory hallucinations, suicidal ideation    General appearance: AA, NAD  HEENT: NCAT, PERRLA, EOMI  Lungs: Clear, equal rise bilateral  Heart: Reg  Abdomen: soft, nondistended, nontender, incisions well healed, no signs of infection, no cellulitis, no hematoma  Ext: Atraumatic, no swelling  : No hernia recurrence        Assessment/Plan:  Thania Cannon is a 80 y.o. male 2 weeks s/p laparoscopic robot assisted left inguinal hernia repair with mesh. Doing well.     Resume activity gradually   No heavy lifting more than 20lbs for 4 weeks total  Pathology reviewed and copy provided  Follow up as needed    Physician Signature: Electronically signed by Dr. Irma Morse  [phone] (p)  11/9/2022  11:46 AM

## 2023-03-27 ENCOUNTER — OFFICE VISIT (OUTPATIENT)
Dept: VASCULAR SURGERY | Age: 88
End: 2023-03-27
Payer: MEDICARE

## 2023-03-27 DIAGNOSIS — I71.23 ANEURYSM OF DESCENDING THORACIC AORTA WITHOUT RUPTURE (HCC): Primary | ICD-10-CM

## 2023-03-27 DIAGNOSIS — I71.9 PENETRATING ATHEROSCLEROTIC ULCER OF AORTA (HCC): ICD-10-CM

## 2023-03-27 PROCEDURE — 1123F ACP DISCUSS/DSCN MKR DOCD: CPT | Performed by: PHYSICIAN ASSISTANT

## 2023-03-27 PROCEDURE — 99213 OFFICE O/P EST LOW 20 MIN: CPT | Performed by: PHYSICIAN ASSISTANT

## 2023-03-27 NOTE — PROGRESS NOTES
LE Edema absent  R femoral 2 L femoral 2   R posterior tibial 3 L posterior tibial biphasic   R dorsalis pedis 1 L dorsalis pedis biphasic     RADIOLOGY:      A/P s/p endovascular repair of thoracic DAIJA  Patient overall doing well  Could not appreciate any mass or ttp on exam and it is not related to his groin incision where his symptoms are- discussed if he continues to have discomfort and is concerned for hernia, he could call Dr Katherine Thayer office for further evaluation  Continue medical management with ASA,  B Blocker, and statin  Emphasized importance of continued Tobacco cessation  Discussed with patient symptoms of abdominal pain, back pain and need to go to ER immediately if they if any symptoms developed  F/U as outpatient in 6 Months with CT chest    Pt seen and plan reviewed with Dr. Clara Werner.      Rekha Giles PA-C

## 2023-04-05 ENCOUNTER — OFFICE VISIT (OUTPATIENT)
Dept: SURGERY | Age: 88
End: 2023-04-05
Payer: MEDICARE

## 2023-04-05 ENCOUNTER — TELEPHONE (OUTPATIENT)
Dept: SURGERY | Age: 88
End: 2023-04-05

## 2023-04-05 VITALS
DIASTOLIC BLOOD PRESSURE: 76 MMHG | BODY MASS INDEX: 24.05 KG/M2 | SYSTOLIC BLOOD PRESSURE: 139 MMHG | HEART RATE: 54 BPM | HEIGHT: 70 IN | WEIGHT: 168 LBS | RESPIRATION RATE: 18 BRPM | TEMPERATURE: 98.4 F

## 2023-04-05 DIAGNOSIS — K40.90 RIGHT INGUINAL HERNIA: Primary | ICD-10-CM

## 2023-04-05 PROCEDURE — 1123F ACP DISCUSS/DSCN MKR DOCD: CPT | Performed by: SURGERY

## 2023-04-05 PROCEDURE — 99214 OFFICE O/P EST MOD 30 MIN: CPT | Performed by: SURGERY

## 2023-04-05 RX ORDER — SODIUM CHLORIDE 0.9 % (FLUSH) 0.9 %
5-40 SYRINGE (ML) INJECTION EVERY 12 HOURS SCHEDULED
OUTPATIENT
Start: 2023-04-05

## 2023-04-05 RX ORDER — SODIUM CHLORIDE 9 MG/ML
INJECTION, SOLUTION INTRAVENOUS PRN
OUTPATIENT
Start: 2023-04-05

## 2023-04-05 RX ORDER — SODIUM CHLORIDE 9 MG/ML
INJECTION, SOLUTION INTRAVENOUS CONTINUOUS
OUTPATIENT
Start: 2023-04-05

## 2023-04-05 RX ORDER — SODIUM CHLORIDE 0.9 % (FLUSH) 0.9 %
5-40 SYRINGE (ML) INJECTION PRN
OUTPATIENT
Start: 2023-04-05

## 2023-04-05 NOTE — TELEPHONE ENCOUNTER
Per Dr. Bethanne Siemens, patient is scheduled for Laparoscopic robotic right inguinal hernia repair with mesh  at [facility] on 23. Surgery scheduled via [facility], surgeon's calendar updated. Dr. Bethanne Siemens to enter orders. Follow up appointment scheduled. Medical clearance requested from Dr. Chai Bruce. Electronically signed by Trish Veliz RN on 2023 at 10:27 AM    Prior Authorization Form:      DEMOGRAPHICS:                     Patient Name:  Mike Patricio  Patient :  1935            Insurance:  Payor: Greg Arguelles / Plan: Mary White ESSENTIAL/PLUS / Product Type: *No Product type* /   Insurance ID Number:    Payer/Plan Subscr  Sex Relation Sub. Ins. ID Effective Group Num   1.  BCBS MEDIC[facility]EDrrafat [facility] 1935 Male Self [id number] [id number]                                    [id number]         DIAGNOSIS & PROCEDURE:                       Procedure/Operation: Laparoscopic robotic right inguinal hernia repair with mesh            CPT Code: 75387    Diagnosis:  Right inguinal hernia    ICD10 Code: K40.90    Location:  [facility]    Surgeon:  Sharath Payor INFORMATION:                          Date: 23    Time: TBD              Anesthesia:  General                                                       Status:  Outpatient        Special Comments:         Electronically signed by Trish Veliz RN on 2023 at 10:27 AM

## 2023-07-05 ENCOUNTER — HOSPITAL ENCOUNTER (OUTPATIENT)
Dept: PREADMISSION TESTING | Age: 88
Discharge: HOME | End: 2023-07-05
Payer: MEDICARE

## 2023-07-05 VITALS
RESPIRATION RATE: 16 BRPM | DIASTOLIC BLOOD PRESSURE: 65 MMHG | TEMPERATURE: 97.6 F | OXYGEN SATURATION: 97 % | SYSTOLIC BLOOD PRESSURE: 106 MMHG | HEIGHT: 70 IN | BODY MASS INDEX: 23.48 KG/M2 | WEIGHT: 164 LBS | HEART RATE: 56 BPM

## 2023-07-05 DIAGNOSIS — Z01.818 PRE-OP TESTING: Primary | ICD-10-CM

## 2023-07-05 LAB
ANION GAP SERPL CALCULATED.3IONS-SCNC: 12 MMOL/L (ref 7–16)
BUN SERPL-MCNC: 18 MG/DL (ref 6–23)
CALCIUM SERPL-MCNC: 9.2 MG/DL (ref 8.6–10.2)
CHLORIDE SERPL-SCNC: 106 MMOL/L (ref 98–107)
CO2 SERPL-SCNC: 22 MMOL/L (ref 22–29)
CREAT SERPL-MCNC: 1.2 MG/DL (ref 0.7–1.2)
EKG ATRIAL RATE: 49 BPM
EKG P AXIS: 96 DEGREES
EKG P-R INTERVAL: 272 MS
EKG Q-T INTERVAL: 470 MS
EKG QRS DURATION: 92 MS
EKG QTC CALCULATION (BAZETT): 424 MS
EKG R AXIS: -26 DEGREES
EKG T AXIS: 15 DEGREES
EKG VENTRICULAR RATE: 49 BPM
ERYTHROCYTE [DISTWIDTH] IN BLOOD BY AUTOMATED COUNT: 14.4 FL (ref 11.5–15)
GLUCOSE SERPL-MCNC: 99 MG/DL (ref 74–99)
HCT VFR BLD AUTO: 42.1 % (ref 37–54)
HGB BLD-MCNC: 13.4 G/DL (ref 12.5–16.5)
MCH RBC QN AUTO: 29.7 PG (ref 26–35)
MCHC RBC AUTO-ENTMCNC: 31.8 % (ref 32–34.5)
MCV RBC AUTO: 93.3 FL (ref 80–99.9)
PLATELET # BLD AUTO: 196 E9/L (ref 130–450)
PMV BLD AUTO: 10.5 FL (ref 7–12)
POTASSIUM SERPL-SCNC: 3.6 MMOL/L (ref 3.5–5)
RBC # BLD AUTO: 4.51 E12/L (ref 3.8–5.8)
SODIUM SERPL-SCNC: 140 MMOL/L (ref 132–146)
WBC # BLD: 10.8 E9/L (ref 4.5–11.5)

## 2023-07-05 PROCEDURE — 80048 BASIC METABOLIC PNL TOTAL CA: CPT

## 2023-07-05 PROCEDURE — 85027 COMPLETE CBC AUTOMATED: CPT

## 2023-07-05 PROCEDURE — 36415 COLL VENOUS BLD VENIPUNCTURE: CPT

## 2023-07-05 PROCEDURE — 93005 ELECTROCARDIOGRAM TRACING: CPT | Performed by: ANESTHESIOLOGY

## 2023-07-05 PROCEDURE — 93010 ELECTROCARDIOGRAM REPORT: CPT | Performed by: INTERNAL MEDICINE

## 2023-07-05 NOTE — PROGRESS NOTES
EKG ,new and previous , faxed to Lindsay Cheng and G[facility] Gross.     Brian Mathis RN.  7/5/2023  3:32 PM  D
nausea, vomiting, etc.  Please notify your surgeon if you experience dizziness, shortness of breath or blurred vision between now & the time of your surgery. If you have ___dentures, they will be removed before going to the OR; we will provide you a container. If you wear ___contact lenses or __x_glasses, they will be removed; please bring a case for them. To provide excellent care visitors will be limited to 2 in the room at any given time. During flu season no children under the age of 15 are permitted in the hospital for the safety of all patients. Other come in main lobby and stop at                  Please call AMBULATORY CARE if you have any further questions.    [address]

## 2023-07-10 ENCOUNTER — ANESTHESIA EVENT (OUTPATIENT)
Dept: OPERATING ROOM | Age: 88
End: 2023-07-10
Payer: MEDICARE

## 2023-07-11 ENCOUNTER — ANESTHESIA (OUTPATIENT)
Dept: OPERATING ROOM | Age: 88
End: 2023-07-11
Payer: MEDICARE

## 2023-07-11 ENCOUNTER — HOSPITAL ENCOUNTER (OUTPATIENT)
Age: 88
Setting detail: OUTPATIENT SURGERY
Discharge: HOME | End: 2023-07-11
Attending: SURGERY | Admitting: SURGERY
Payer: MEDICARE

## 2023-07-11 VITALS
DIASTOLIC BLOOD PRESSURE: 72 MMHG | RESPIRATION RATE: 18 BRPM | TEMPERATURE: 97.6 F | OXYGEN SATURATION: 96 % | SYSTOLIC BLOOD PRESSURE: 145 MMHG | HEART RATE: 66 BPM

## 2023-07-11 DIAGNOSIS — K40.90 RIGHT INGUINAL HERNIA: ICD-10-CM

## 2023-07-11 DIAGNOSIS — G89.18 POSTOPERATIVE PAIN: Primary | ICD-10-CM

## 2023-07-11 PROCEDURE — 2720000010 HC SURG SUPPLY STERILE: Performed by: SURGERY

## 2023-07-11 PROCEDURE — S2900 ROBOTIC SURGICAL SYSTEM: HCPCS | Performed by: SURGERY

## 2023-07-11 PROCEDURE — 2500000003 HC RX 250 WO HCPCS

## 2023-07-11 PROCEDURE — 6360000002 HC RX W HCPCS

## 2023-07-11 PROCEDURE — 3600000009 HC SURGERY ROBOT BASE: Performed by: SURGERY

## 2023-07-11 PROCEDURE — 6360000002 HC RX W HCPCS: Performed by: SURGERY

## 2023-07-11 PROCEDURE — 6360000002 HC RX W HCPCS: Performed by: ANESTHESIOLOGY

## 2023-07-11 PROCEDURE — 2580000003 HC RX 258: Performed by: SURGERY

## 2023-07-11 PROCEDURE — 2580000003 HC RX 258

## 2023-07-11 PROCEDURE — C9399 UNCLASSIFIED DRUGS OR BIOLOG: HCPCS

## 2023-07-11 PROCEDURE — 7100000001 HC PACU RECOVERY - ADDTL 15 MIN: Performed by: SURGERY

## 2023-07-11 PROCEDURE — 49650 LAP ING HERNIA REPAIR INIT: CPT | Performed by: SURGERY

## 2023-07-11 PROCEDURE — 3700000001 HC ADD 15 MINUTES (ANESTHESIA): Performed by: SURGERY

## 2023-07-11 PROCEDURE — C1781 MESH (IMPLANTABLE): HCPCS | Performed by: SURGERY

## 2023-07-11 PROCEDURE — 3700000000 HC ANESTHESIA ATTENDED CARE: Performed by: SURGERY

## 2023-07-11 PROCEDURE — 2500000003 HC RX 250 WO HCPCS: Performed by: SURGERY

## 2023-07-11 PROCEDURE — 7100000000 HC PACU RECOVERY - FIRST 15 MIN: Performed by: SURGERY

## 2023-07-11 PROCEDURE — 2709999900 HC NON-CHARGEABLE SUPPLY: Performed by: SURGERY

## 2023-07-11 PROCEDURE — 7100000010 HC PHASE II RECOVERY - FIRST 15 MIN: Performed by: SURGERY

## 2023-07-11 PROCEDURE — 7100000011 HC PHASE II RECOVERY - ADDTL 15 MIN: Performed by: SURGERY

## 2023-07-11 PROCEDURE — 3600000019 HC SURGERY ROBOT ADDTL 15MIN: Performed by: SURGERY

## 2023-07-11 DEVICE — MESH HERN MID ANAT XL 7X5 IN RT 3DMAX: Type: IMPLANTABLE DEVICE | Site: PELVIS | Status: FUNCTIONAL

## 2023-07-11 RX ORDER — MORPHINE SULFATE 2 MG/ML
2 INJECTION, SOLUTION INTRAMUSCULAR; INTRAVENOUS EVERY 5 MIN PRN
Status: DISCONTINUED | OUTPATIENT
Start: 2023-07-11 | End: 2023-07-11 | Stop reason: HOSPADM

## 2023-07-11 RX ORDER — DEXAMETHASONE SODIUM PHOSPHATE 10 MG/ML
INJECTION, SOLUTION INTRAMUSCULAR; INTRAVENOUS PRN
Status: DISCONTINUED | OUTPATIENT
Start: 2023-07-11 | End: 2023-07-11 | Stop reason: SDUPTHER

## 2023-07-11 RX ORDER — SODIUM CHLORIDE 9 MG/ML
INJECTION, SOLUTION INTRAVENOUS CONTINUOUS PRN
Status: DISCONTINUED | OUTPATIENT
Start: 2023-07-11 | End: 2023-07-11 | Stop reason: SDUPTHER

## 2023-07-11 RX ORDER — OXYCODONE HYDROCHLORIDE AND ACETAMINOPHEN 5; 325 MG/1; MG/1
1 TABLET ORAL EVERY 6 HOURS PRN
Qty: 10 TABLET | Refills: 0 | Status: SHIPPED | OUTPATIENT
Start: 2023-07-11 | End: 2023-07-14

## 2023-07-11 RX ORDER — HYDRALAZINE HYDROCHLORIDE 20 MG/ML
10 INJECTION INTRAMUSCULAR; INTRAVENOUS
Status: DISCONTINUED | OUTPATIENT
Start: 2023-07-11 | End: 2023-07-11 | Stop reason: HOSPADM

## 2023-07-11 RX ORDER — MIDAZOLAM HYDROCHLORIDE 1 MG/ML
2 INJECTION INTRAMUSCULAR; INTRAVENOUS
Status: DISCONTINUED | OUTPATIENT
Start: 2023-07-11 | End: 2023-07-11 | Stop reason: HOSPADM

## 2023-07-11 RX ORDER — DOCUSATE SODIUM 100 MG/1
100 CAPSULE, LIQUID FILLED ORAL 2 TIMES DAILY
Qty: 30 CAPSULE | Refills: 0 | Status: SHIPPED | OUTPATIENT
Start: 2023-07-11 | End: 2023-07-26

## 2023-07-11 RX ORDER — PROCHLORPERAZINE EDISYLATE 5 MG/ML
5 INJECTION INTRAMUSCULAR; INTRAVENOUS
Status: DISCONTINUED | OUTPATIENT
Start: 2023-07-11 | End: 2023-07-11 | Stop reason: HOSPADM

## 2023-07-11 RX ORDER — ONDANSETRON 2 MG/ML
4 INJECTION INTRAMUSCULAR; INTRAVENOUS
Status: DISCONTINUED | OUTPATIENT
Start: 2023-07-11 | End: 2023-07-11 | Stop reason: HOSPADM

## 2023-07-11 RX ORDER — IPRATROPIUM BROMIDE AND ALBUTEROL SULFATE 2.5; .5 MG/3ML; MG/3ML
1 SOLUTION RESPIRATORY (INHALATION)
Status: DISCONTINUED | OUTPATIENT
Start: 2023-07-11 | End: 2023-07-11 | Stop reason: HOSPADM

## 2023-07-11 RX ORDER — SODIUM CHLORIDE 0.9 % (FLUSH) 0.9 %
5-40 SYRINGE (ML) INJECTION PRN
Status: DISCONTINUED | OUTPATIENT
Start: 2023-07-11 | End: 2023-07-11 | Stop reason: HOSPADM

## 2023-07-11 RX ORDER — ONDANSETRON 4 MG/1
4 TABLET, ORALLY DISINTEGRATING ORAL EVERY 8 HOURS PRN
Qty: 20 TABLET | Refills: 1 | Status: SHIPPED | OUTPATIENT
Start: 2023-07-11

## 2023-07-11 RX ORDER — ROCURONIUM BROMIDE 10 MG/ML
INJECTION, SOLUTION INTRAVENOUS PRN
Status: DISCONTINUED | OUTPATIENT
Start: 2023-07-11 | End: 2023-07-11 | Stop reason: SDUPTHER

## 2023-07-11 RX ORDER — ONDANSETRON 2 MG/ML
INJECTION INTRAMUSCULAR; INTRAVENOUS PRN
Status: DISCONTINUED | OUTPATIENT
Start: 2023-07-11 | End: 2023-07-11 | Stop reason: SDUPTHER

## 2023-07-11 RX ORDER — LABETALOL HYDROCHLORIDE 5 MG/ML
10 INJECTION, SOLUTION INTRAVENOUS
Status: DISCONTINUED | OUTPATIENT
Start: 2023-07-11 | End: 2023-07-11 | Stop reason: HOSPADM

## 2023-07-11 RX ORDER — KETOROLAC TROMETHAMINE 15 MG/ML
15 INJECTION, SOLUTION INTRAMUSCULAR; INTRAVENOUS
Status: COMPLETED | OUTPATIENT
Start: 2023-07-11 | End: 2023-07-11

## 2023-07-11 RX ORDER — SODIUM CHLORIDE 9 MG/ML
INJECTION, SOLUTION INTRAVENOUS CONTINUOUS
Status: DISCONTINUED | OUTPATIENT
Start: 2023-07-11 | End: 2023-07-11 | Stop reason: HOSPADM

## 2023-07-11 RX ORDER — SODIUM CHLORIDE 0.9 % (FLUSH) 0.9 %
5-40 SYRINGE (ML) INJECTION EVERY 12 HOURS SCHEDULED
Status: DISCONTINUED | OUTPATIENT
Start: 2023-07-11 | End: 2023-07-11 | Stop reason: HOSPADM

## 2023-07-11 RX ORDER — DIPHENHYDRAMINE HYDROCHLORIDE 50 MG/ML
12.5 INJECTION INTRAMUSCULAR; INTRAVENOUS
Status: DISCONTINUED | OUTPATIENT
Start: 2023-07-11 | End: 2023-07-11 | Stop reason: HOSPADM

## 2023-07-11 RX ORDER — PROPOFOL 10 MG/ML
INJECTION, EMULSION INTRAVENOUS PRN
Status: DISCONTINUED | OUTPATIENT
Start: 2023-07-11 | End: 2023-07-11 | Stop reason: SDUPTHER

## 2023-07-11 RX ORDER — FENTANYL CITRATE 0.05 MG/ML
25 INJECTION, SOLUTION INTRAMUSCULAR; INTRAVENOUS EVERY 5 MIN PRN
Status: DISCONTINUED | OUTPATIENT
Start: 2023-07-11 | End: 2023-07-11 | Stop reason: HOSPADM

## 2023-07-11 RX ORDER — FENTANYL CITRATE 50 UG/ML
INJECTION, SOLUTION INTRAMUSCULAR; INTRAVENOUS PRN
Status: DISCONTINUED | OUTPATIENT
Start: 2023-07-11 | End: 2023-07-11 | Stop reason: SDUPTHER

## 2023-07-11 RX ORDER — SODIUM CHLORIDE 9 MG/ML
INJECTION, SOLUTION INTRAVENOUS PRN
Status: DISCONTINUED | OUTPATIENT
Start: 2023-07-11 | End: 2023-07-11 | Stop reason: HOSPADM

## 2023-07-11 RX ORDER — LIDOCAINE HYDROCHLORIDE 20 MG/ML
INJECTION, SOLUTION INTRAVENOUS PRN
Status: DISCONTINUED | OUTPATIENT
Start: 2023-07-11 | End: 2023-07-11 | Stop reason: SDUPTHER

## 2023-07-11 RX ADMIN — SODIUM CHLORIDE: 9 INJECTION, SOLUTION INTRAVENOUS at 07:33

## 2023-07-11 RX ADMIN — ONDANSETRON 4 MG: 2 INJECTION INTRAMUSCULAR; INTRAVENOUS at 09:38

## 2023-07-11 RX ADMIN — FENTANYL CITRATE 50 MCG: 50 INJECTION, SOLUTION INTRAMUSCULAR; INTRAVENOUS at 08:49

## 2023-07-11 RX ADMIN — ROCURONIUM BROMIDE 30 MG: 10 SOLUTION INTRAVENOUS at 08:50

## 2023-07-11 RX ADMIN — SUGAMMADEX 200 MG: 100 INJECTION, SOLUTION INTRAVENOUS at 09:38

## 2023-07-11 RX ADMIN — SODIUM CHLORIDE: 900 INJECTION, SOLUTION INTRAVENOUS at 08:45

## 2023-07-11 RX ADMIN — FENTANYL CITRATE 50 MCG: 50 INJECTION, SOLUTION INTRAMUSCULAR; INTRAVENOUS at 09:14

## 2023-07-11 RX ADMIN — CEFAZOLIN 2000 MG: 2 INJECTION, POWDER, FOR SOLUTION INTRAMUSCULAR; INTRAVENOUS at 08:45

## 2023-07-11 RX ADMIN — LIDOCAINE HYDROCHLORIDE 60 MG: 20 INJECTION, SOLUTION INTRAVENOUS at 08:49

## 2023-07-11 RX ADMIN — KETOROLAC TROMETHAMINE 15 MG: 15 INJECTION, SOLUTION INTRAMUSCULAR; INTRAVENOUS at 10:40

## 2023-07-11 RX ADMIN — PROPOFOL 120 MG: 10 INJECTION, EMULSION INTRAVENOUS at 08:49

## 2023-07-11 RX ADMIN — DEXAMETHASONE SODIUM PHOSPHATE 10 MG: 10 INJECTION, SOLUTION INTRAMUSCULAR; INTRAVENOUS at 08:56

## 2023-07-11 ASSESSMENT — PAIN - FUNCTIONAL ASSESSMENT: PAIN_FUNCTIONAL_ASSESSMENT: NONE - DENIES PAIN

## 2023-07-11 NOTE — ANESTHESIA PRE PROCEDURE
Department of Anesthesiology  Preprocedure Note       Name:  Abel Martínez   Age:  80 y.o.  :  1935                                          MRN:  [id number]         Date:  2023      Surgeon: Willy Craven):  Lluvia Sarmiento MD    Procedure: Procedure(s):  LAPAROSCOPIC ROBOTIC RIGHT INGUINAL HERNIA REPAIR WITH MESH POSSIBLE BILATERAL    Medications prior to admission:   Prior to Admission medications    Medication Sig Start Date End Date Taking? Authorizing Provider   pantoprazole (PROTONIX) 40 MG tablet  3/26/23   Historical Provider, MD   Probiotic Product (CULTURELLE PROBIOTICS PO) Take by mouth    Historical Provider, MD   rosuvastatin (CRESTOR) 10 MG tablet TAKE 1 TABLET BY MOUTH AT  NIGHT 10/18/22   Jseus Hoskinson , DO   metoprolol tartrate (LOPRESSOR) 50 MG tablet Take 1 tablet by mouth daily 22   Historical Provider, MD   amLODIPine (NORVASC) 10 MG tablet Take 1 tablet by mouth daily 22  Jesus Kenney,    mirtazapine (REMERON) 15 MG tablet Take 1 tablet by mouth nightly 22   Jesus Kenney, DO   PARoxetine (PAXIL) 20 MG tablet ONE DAILY 22   Jesus Kenney,    furosemide (LASIX) 20 MG tablet Take 1 tablet by mouth in the morning.   Patient taking differently: Take 1 tablet by mouth daily Maybe on 40mg 22   Ebony Mike DO   [facility]) 625 MG tablet Take 1 tablet by mouth 2 times daily (with meals) 22   Ebony Mike DO   ferrous sulfate (IRON 325) 325 (65 Fe) MG tablet Take 1 tablet by mouth daily (with breakfast) 22   Jesus Kenney DO   lisinopril (PRINIVIL;ZESTRIL) 20 MG tablet Take 1 tablet by mouth daily 22   Ebony Mike DO   aspirin 81 MG chewable tablet Take 1 tablet by mouth daily 5/3/22   Izabel Ford MD   Bismuth Subsalicylate (PEPTO-BISMOL PO) Take by mouth as needed    Historical Provider, MD   Multiple Vitamins-Minerals (VITEYES AREDS FORMULA/LUTEIN PO) Take by mouth Daily    Historical Provider, MD

## 2023-07-11 NOTE — OP NOTE
Operative Note  Steven Richard MD, MS Mikaela Garcia  MRN: [id number]  DATE OF PROCEDURE: 7/11/2023    PREOPERATIVE DIAGNOSIS: Symptomatic right inguinal hernia. POSTOPERATIVE DIAGNOSIS: Symptomatic right inguinal hernia. PROCEDURE: Laparoscopic robotic assisted right inguinal hernia repair with mesh. ANESTHESIA: General endotracheal.     SURGEON: Jazz Jimenez MD     ASSISTANT: None. COMPLICATIONS: None. ESTIMATED BLOOD LOSS: Minimal.     FLUIDS: Crystalloid. SPECIMENS: none. Implant Name Type Inv. Item Serial No.  Lot No. LRB No. Used Action   MESH YAKOV MID FADIA XL 7X5 IN RT 3DMAX - [id number]  MESH YAKOV MID FADIA XL 7X5 IN RT 3DMAX  BARD DAVOL-WD [id number] Right 1 Implanted        INDICATIONS: Mikaela Garcia is a 80 y.o. male with symptomatic inguinal hernia. On physical exam, they had a palpable right inguinal hernia. After being explained the risks, benefits, and alternatives of procedure, including but not limited to bleeding, scar, infection, recurrence they agreed to proceed. DESCRIPTION OF PROCEDURE: They were taken to the operating room, placed supine and administered general anesthesia and intubated. Once the airway was secured and they were adequately sedated, they were prepped and draped in the normal sterile fashion. Timeout was performed to confirm surgical site and the patient's name. They received preoperative antibiotics in the form of IV. We initially made an 8-mm incision superior to the umbilicus, inserted a Veress needle, confirmed the needle placement and insufflated to 15 mmHg. We then removed the Veress needle, inserted an 8-mm trocar, inserted the camera and, following, inspected the abdomen. Upon entrance into the abdomen, we identified right indirect hernia.  The patient was placed in steep Trendelenburg position and two more 8 mm trocars, 1 was inserted in the right lateral abdomen and 1 in the left lateral

## 2023-07-11 NOTE — ANESTHESIA POSTPROCEDURE EVALUATION
Department of Anesthesiology  Postprocedure Note    Patient: Sonny Kohler  MRN: [id number]  YOB: 1935  Date of evaluation: 7/11/2023      Procedure Summary     Date: 07/11/23 Room / Location: [facility] 06 / [address]    Anesthesia Start: 0845 Anesthesia Stop: 0068    Procedure: LAPAROSCOPIC ROBOTIC RIGHT INGUINAL HERNIA REPAIR WITH MESH (Right) Diagnosis:       Right inguinal hernia      (Right inguinal hernia [K40.90])    Surgeons: Joseph Lyons MD Responsible Provider: Lalo Chappell MD    Anesthesia Type: General ASA Status: 3          Anesthesia Type: General    Sumanth Phase I: Sumanth Score: 9    Sumanth Phase II: Sumanth Score: 10      Anesthesia Post Evaluation    Patient location during evaluation: PACU  Patient participation: complete - patient participated  Level of consciousness: awake  Airway patency: patent  Nausea & Vomiting: no nausea and no vomiting  Complications: no  Cardiovascular status: hemodynamically stable  Respiratory status: acceptable  Hydration status: euvolemic

## 2023-07-11 NOTE — PROGRESS NOTES
CLINICAL PHARMACY NOTE: MEDS TO BEDS    Total # of Prescriptions Filled: 3   The following medications were delivered to the patient:  Percocet 5/325 mg  Docusate sodium 100 mg  Ondansetron 4 mg odt    Additional Documentation:

## 2023-07-24 ENCOUNTER — TELEPHONE (OUTPATIENT)
Dept: SURGERY | Age: 88
End: 2023-07-24

## 2023-07-24 NOTE — TELEPHONE ENCOUNTER
Patient fell ill over night and is not able to make appointment today- I rescheduled to this Wednesday 7/26

## 2023-07-26 ENCOUNTER — OFFICE VISIT (OUTPATIENT)
Dept: SURGERY | Age: 88
End: 2023-07-26

## 2023-07-26 VITALS
DIASTOLIC BLOOD PRESSURE: 62 MMHG | HEIGHT: 70 IN | RESPIRATION RATE: 18 BRPM | SYSTOLIC BLOOD PRESSURE: 102 MMHG | TEMPERATURE: 98.2 F | WEIGHT: 164 LBS | BODY MASS INDEX: 23.48 KG/M2 | HEART RATE: 57 BPM

## 2023-07-26 DIAGNOSIS — K40.90 RIGHT INGUINAL HERNIA: Primary | ICD-10-CM

## 2023-07-26 PROCEDURE — 99024 POSTOP FOLLOW-UP VISIT: CPT | Performed by: SURGERY

## 2023-08-16 ENCOUNTER — TELEPHONE (OUTPATIENT)
Dept: VASCULAR SURGERY | Age: 88
End: 2023-08-16

## 2023-08-16 DIAGNOSIS — I71.9 PENETRATING ATHEROSCLEROTIC ULCER OF AORTA (HCC): ICD-10-CM

## 2023-08-16 DIAGNOSIS — I71.23 ANEURYSM OF DESCENDING THORACIC AORTA WITHOUT RUPTURE (HCC): Primary | ICD-10-CM

## 2023-08-16 NOTE — TELEPHONE ENCOUNTER
Scheduled CTA chest at [facility] ([facility]) 9/11/23 at 11:30 a.m. Pt was notified and instructed to report at 11:00 a.m to register, be NPO x 3 hours, have labs drawn (order faxed to [facility]).

## 2023-08-24 ENCOUNTER — APPOINTMENT (OUTPATIENT)
Dept: CT IMAGING | Age: 88
End: 2023-08-24
Payer: MEDICARE

## 2023-08-24 ENCOUNTER — HOSPITAL ENCOUNTER (EMERGENCY)
Age: 88
Discharge: HOME | End: 2023-08-24
Attending: STUDENT IN AN ORGANIZED HEALTH CARE EDUCATION/TRAINING PROGRAM
Payer: MEDICARE

## 2023-08-24 VITALS
HEART RATE: 60 BPM | WEIGHT: 163 LBS | SYSTOLIC BLOOD PRESSURE: 140 MMHG | BODY MASS INDEX: 23.39 KG/M2 | DIASTOLIC BLOOD PRESSURE: 80 MMHG | RESPIRATION RATE: 18 BRPM | TEMPERATURE: 97.7 F | OXYGEN SATURATION: 94 %

## 2023-08-24 DIAGNOSIS — S22.080D COMPRESSION FRACTURE OF T12 VERTEBRA WITH ROUTINE HEALING: Primary | ICD-10-CM
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
## 2023-08-24 DIAGNOSIS — N28.9 RENAL INSUFFICIENCY: ICD-10-CM

## 2023-08-24 LAB
ALBUMIN SERPL-MCNC: 3.9 G/DL (ref 3.5–5.2)
ALP SERPL-CCNC: 86 U/L (ref 40–129)
ALT SERPL-CCNC: 14 U/L (ref 0–40)
ANION GAP SERPL CALCULATED.3IONS-SCNC: 9 MMOL/L (ref 7–16)
AST SERPL-CCNC: 22 U/L (ref 0–39)
BASOPHILS # BLD: 0.05 K/UL (ref 0–0.2)
BASOPHILS NFR BLD: 0 % (ref 0–2)
BILIRUB SERPL-MCNC: 0.8 MG/DL (ref 0–1.2)
BILIRUB UR QL STRIP: NEGATIVE
BUN SERPL-MCNC: 23 MG/DL (ref 6–23)
CALCIUM SERPL-MCNC: 8.9 MG/DL (ref 8.6–10.2)
CASTS #/AREA URNS LPF: ABNORMAL /LPF
CHLORIDE SERPL-SCNC: 101 MMOL/L (ref 98–107)
CLARITY UR: CLEAR
CO2 SERPL-SCNC: 26 MMOL/L (ref 22–29)
COLOR UR: YELLOW
CREAT SERPL-MCNC: 1.5 MG/DL (ref 0.7–1.2)
EOSINOPHIL # BLD: 0.54 K/UL (ref 0.05–0.5)
EOSINOPHILS RELATIVE PERCENT: 5 % (ref 0–6)
EPI CELLS #/AREA URNS HPF: ABNORMAL /HPF
ERYTHROCYTE [DISTWIDTH] IN BLOOD BY AUTOMATED COUNT: 15 % (ref 11.5–15)
GFR SERPL CREATININE-BSD FRML MDRD: 44 ML/MIN/1.73M2
GLUCOSE SERPL-MCNC: 119 MG/DL (ref 74–99)
GLUCOSE UR STRIP-MCNC: NEGATIVE MG/DL
HCT VFR BLD AUTO: 38.9 % (ref 37–54)
HGB BLD-MCNC: 12.6 G/DL (ref 12.5–16.5)
HGB UR QL STRIP.AUTO: NEGATIVE
IMM GRANULOCYTES # BLD AUTO: 0.05 K/UL (ref 0–0.58)
IMM GRANULOCYTES NFR BLD: 0 % (ref 0–5)
KETONES UR STRIP-MCNC: NEGATIVE MG/DL
LACTATE BLDV-SCNC: 1.5 MMOL/L (ref 0.5–1.9)
LEUKOCYTE ESTERASE UR QL STRIP: NEGATIVE
LYMPHOCYTES NFR BLD: 1.93 K/UL (ref 1.5–4)
LYMPHOCYTES RELATIVE PERCENT: 17 % (ref 20–42)
MCH RBC QN AUTO: 29.6 PG (ref 26–35)
MCHC RBC AUTO-ENTMCNC: 32.4 G/DL (ref 32–34.5)
MCV RBC AUTO: 91.3 FL (ref 80–99.9)
MONOCYTES NFR BLD: 1.29 K/UL (ref 0.1–0.95)
MONOCYTES NFR BLD: 11 % (ref 2–12)
NEUTROPHILS NFR BLD: 67 % (ref 43–80)
NEUTS SEG NFR BLD: 7.81 K/UL (ref 1.8–7.3)
NITRITE UR QL STRIP: NEGATIVE
PH UR STRIP: 6 [PH] (ref 5–9)
PLATELET # BLD AUTO: 205 K/UL (ref 130–450)
PMV BLD AUTO: 10.8 FL (ref 7–12)
POTASSIUM SERPL-SCNC: 3.6 MMOL/L (ref 3.5–5)
PROT SERPL-MCNC: 7.6 G/DL (ref 6.4–8.3)
PROT UR STRIP-MCNC: ABNORMAL MG/DL
RBC # BLD AUTO: 4.26 M/UL (ref 3.8–5.8)
RBC #/AREA URNS HPF: ABNORMAL /HPF
SODIUM SERPL-SCNC: 136 MMOL/L (ref 132–146)
SP GR UR STRIP: 1.01 (ref 1–1.03)
UROBILINOGEN UR STRIP-ACNC: 0.2 EU/DL (ref 0–1)
WBC #/AREA URNS HPF: ABNORMAL /HPF
WBC OTHER # BLD: 11.7 K/UL (ref 4.5–11.5)

## 2023-08-24 PROCEDURE — 81001 URINALYSIS AUTO W/SCOPE: CPT

## 2023-08-24 PROCEDURE — 83605 ASSAY OF LACTIC ACID: CPT

## 2023-08-24 PROCEDURE — 72131 CT LUMBAR SPINE W/O DYE: CPT

## 2023-08-24 PROCEDURE — 85025 COMPLETE CBC W/AUTO DIFF WBC: CPT

## 2023-08-24 PROCEDURE — 74176 CT ABD & PELVIS W/O CONTRAST: CPT

## 2023-08-24 PROCEDURE — 6370000000 HC RX 637 (ALT 250 FOR IP): Performed by: NURSE PRACTITIONER

## 2023-08-24 PROCEDURE — 72128 CT CHEST SPINE W/O DYE: CPT

## 2023-08-24 PROCEDURE — 80053 COMPREHEN METABOLIC PANEL: CPT

## 2023-08-24 PROCEDURE — 99284 EMERGENCY DEPT VISIT MOD MDM: CPT

## 2023-08-24 RX ORDER — LIDOCAINE 4 G/G
1 PATCH TOPICAL ONCE
Status: DISCONTINUED | OUTPATIENT
Start: 2023-08-24 | End: 2023-08-24 | Stop reason: HOSPADM

## 2023-08-24 RX ORDER — OXYCODONE HYDROCHLORIDE 5 MG/1
5 TABLET ORAL EVERY 6 HOURS PRN
Qty: 12 TABLET | Refills: 0 | Status: SHIPPED | OUTPATIENT
Start: 2023-08-24 | End: 2023-08-27

## 2023-08-24 RX ORDER — OXYCODONE HYDROCHLORIDE 5 MG/1
5 TABLET ORAL ONCE
Status: COMPLETED | OUTPATIENT
Start: 2023-08-24 | End: 2023-08-24

## 2023-08-24 RX ADMIN — OXYCODONE HYDROCHLORIDE 5 MG: 5 TABLET ORAL at 19:37

## 2023-08-24 RX ADMIN — OXYCODONE HYDROCHLORIDE 5 MG: 5 TABLET ORAL at 14:33

## 2023-08-24 ASSESSMENT — PAIN - FUNCTIONAL ASSESSMENT: PAIN_FUNCTIONAL_ASSESSMENT: 0-10

## 2023-08-24 ASSESSMENT — PAIN SCALES - GENERAL
PAINLEVEL_OUTOF10: 7
PAINLEVEL_OUTOF10: 7

## 2023-08-24 NOTE — DISCHARGE INSTRUCTIONS
Tylenol no more than 3000 mg per day  Use the lidoderm patches - can keep them on 24 hours per day  Call Dr Samy Lara in the morning to see if can get an earlier appt  Keep active  Use pain meds only as needed = can use half tablet if that is helpful. Make sure to eat well and use stool softeners as needed  Can also use a heating pad for 10 to 15 minutes every 3-4 hours - no more  Avoid the ibuprofen due to the kidney impairment    IF YOU ARE WORSE,COME BACK TO THE ED. .BE CAREFUL YOU ARE FALLS RISKS DUE TO THE WALKER AND PAIN MEDS

## 2023-08-25 NOTE — PROGRESS NOTES
CLINICAL PHARMACY NOTE: MEDS TO BEDS    Total # of Prescriptions Filled: 1   The following medications were delivered to the patient:  Oxycodone 5 mg    Additional Documentation:     Meds picked by yaritza Sams in the pharmacy

## 2023-08-25 NOTE — ED PROVIDER NOTES
- 80.0 %    Lymphocytes % 17 (L) 20.0 - 42.0 %    Monocytes % 11 2.0 - 12.0 %    Eosinophils % 5 0 - 6 %    Basophils % 0 0.0 - 2.0 %    Immature Granulocytes 0 0.0 - 5.0 %    Neutrophils Absolute 7.81 (H) 1.80 - 7.30 k/uL    Lymphocytes Absolute 1.93 1.50 - 4.00 k/uL    Monocytes Absolute 1.29 (H) 0.10 - 0.95 k/uL    Eosinophils Absolute 0.54 (H) 0.05 - 0.50 k/uL    Basophils Absolute 0.05 0.00 - 0.20 k/uL    Absolute Immature Granulocyte 0.05 0.00 - 0.58 k/uL   Comprehensive Metabolic Panel   Result Value Ref Range    Glucose 119 (H) 74 - 99 mg/dL    BUN 23 6 - 23 mg/dL    Creatinine 1.5 (H) 0.70 - 1.20 mg/dL    Est, Glom Filt Rate 44 (L) >60 mL/min/1.73m2    Calcium 8.9 8.6 - 10.2 mg/dL    Sodium 136 132 - 146 mmol/L    Potassium 3.6 3.5 - 5.0 mmol/L    Chloride 101 98 - 107 mmol/L    CO2 26 22 - 29 mmol/L    Anion Gap 9 7 - 16 mmol/L    Alkaline Phosphatase 86 40 - 129 U/L    ALT 14 0 - 40 U/L    AST 22 0 - 39 U/L    Total Bilirubin 0.8 0.0 - 1.2 mg/dL    Total Protein 7.6 6.4 - 8.3 g/dL    Albumin 3.9 3.5 - 5.2 g/dL   Lactate, Sepsis   Result Value Ref Range    Lactic Acid, Sepsis 1.5 0.5 - 1.9 mmol/L   Urinalysis   Result Value Ref Range    Color, UA Yellow Yellow    Turbidity UA Clear Clear    Glucose, Ur NEGATIVE NEGATIVE mg/dL    Bilirubin Urine NEGATIVE NEGATIVE    Ketones, Urine NEGATIVE NEGATIVE mg/dL    Specific Gravity, UA 1.015 1.005 - 1.030    Urine Hgb NEGATIVE NEGATIVE    pH, UA 6.0 5.0 - 9.0    Protein, UA TRACE (A) NEGATIVE mg/dL    Urobilinogen, Urine 0.2 0.0 - 1.0 EU/dL    Nitrite, Urine NEGATIVE NEGATIVE    Leukocyte Esterase, Urine NEGATIVE NEGATIVE   Microscopic Urinalysis   Result Value Ref Range    WBC, UA 0 TO 5 0 TO 5 /HPF    RBC, UA 0 TO 2 0 TO 2 /HPF    Casts UA 0 TO 2 HYALINE (A) None /LPF    Epithelial Cells UA 0 TO 2 /HPF       RADIOLOGY:  Interpreted by Radiologist.  CT THORACIC [place]   Final Result   1.  New anterior wedge compression fracture involving T11 with loss of

## 2023-09-11 ENCOUNTER — CARE COORDINATION (OUTPATIENT)
Dept: CARE COORDINATION | Age: 88
End: 2023-09-11

## 2023-09-11 ENCOUNTER — CARE COORDINATION (OUTPATIENT)
Dept: CASE MANAGEMENT | Age: 88
End: 2023-09-11

## 2023-09-11 NOTE — CARE COORDINATION
Left a voicemail at [facility] to obtain discharge disposition.

## 2023-09-11 NOTE — CARE COORDINATION
[address] Discharge Call    2023    Patient: Colonel Sanchez Patient : 1935   MRN: <[id number]>  Reason for Admission: R Inguinal hernia  Discharge Date: 23 RARS: No data recorded     Per Patientping patient discharge from [facility] to a assisted living. Request to  to obtain location information and a updated med list. Will scan in if received.     Rachael Vargas RN

## 2023-09-11 NOTE — CARE COORDINATION
Updated medication list requested from [facility]. Will notify CTN at this time.

## 2023-09-13 ENCOUNTER — HOSPITAL ENCOUNTER (OUTPATIENT)
Dept: CT IMAGING | Age: 88
Discharge: HOME | End: 2023-09-15
Payer: MEDICARE

## 2023-09-13 ENCOUNTER — CARE COORDINATION (OUTPATIENT)
Dept: CASE MANAGEMENT | Age: 88
End: 2023-09-13

## 2023-09-13 DIAGNOSIS — I71.9 PENETRATING ATHEROSCLEROTIC ULCER OF AORTA (HCC): ICD-10-CM

## 2023-09-13 DIAGNOSIS — I71.23 ANEURYSM OF DESCENDING THORACIC AORTA WITHOUT RUPTURE (HCC): ICD-10-CM

## 2023-09-13 PROCEDURE — 71275 CT ANGIOGRAPHY CHEST: CPT

## 2023-09-13 PROCEDURE — 6360000004 HC RX CONTRAST MEDICATION: Performed by: RADIOLOGY

## 2023-09-13 RX ADMIN — IOPAMIDOL 75 ML: 755 INJECTION, SOLUTION INTRAVENOUS at 11:22

## 2023-09-13 NOTE — CARE COORDINATION
[address] Update Call    2023    Patient: Javed Wilson Patient : 1935   MRN: <[id number]>  Reason for Admission:   Discharge Date: 23 RARS: No data recorded    Received updates med list from [address] [place] [address]. List is scanned.

## 2023-09-14 ENCOUNTER — HOSPITAL ENCOUNTER (OUTPATIENT)
Dept: MRI IMAGING | Age: 88
Discharge: HOME | End: 2023-09-16
Payer: MEDICARE

## 2023-09-14 ENCOUNTER — OFFICE VISIT (OUTPATIENT)
Dept: NEUROSURGERY | Age: 88
End: 2023-09-14
Payer: MEDICARE

## 2023-09-14 DIAGNOSIS — S22.080A CLOSED WEDGE COMPRESSION FRACTURE OF T11 VERTEBRA, INITIAL ENCOUNTER (HCC): ICD-10-CM

## 2023-09-14 DIAGNOSIS — S22.080A CLOSED WEDGE COMPRESSION FRACTURE OF T11 VERTEBRA, INITIAL ENCOUNTER (HCC): Primary | ICD-10-CM

## 2023-09-14 PROCEDURE — 99203 OFFICE O/P NEW LOW 30 MIN: CPT | Performed by: STUDENT IN AN ORGANIZED HEALTH CARE EDUCATION/TRAINING PROGRAM

## 2023-09-14 PROCEDURE — 1123F ACP DISCUSS/DSCN MKR DOCD: CPT | Performed by: STUDENT IN AN ORGANIZED HEALTH CARE EDUCATION/TRAINING PROGRAM

## 2023-09-14 PROCEDURE — 72146 MRI CHEST SPINE W/O DYE: CPT

## 2023-09-14 PROCEDURE — 99202 OFFICE O/P NEW SF 15 MIN: CPT

## 2023-09-14 ASSESSMENT — ENCOUNTER SYMPTOMS
SHORTNESS OF BREATH: 0
PHOTOPHOBIA: 0
BACK PAIN: 1
TROUBLE SWALLOWING: 0
ABDOMINAL PAIN: 0

## 2023-09-14 NOTE — PROGRESS NOTES
Subjective:      Patient ID: Sandra Villagomez is a 80 y.o. male with a PMH of BPH, CAD, HTN and IBS who presents for evaluation of low back pain. Patient states he fell at home about 2 weeks ago and has had a sharp pain in his lower back that radiates into his right side of his stomach. He denies any numbness or weakness associated with this pain. He has tried oxycodone for the pain with mild relief. He denies bowel or bladder incontinence. Patient is a nonsmoker and takes ASA daily. CT scan reviewed with patient. Review of Systems   Constitutional:  Negative for chills and fever. HENT:  Negative for trouble swallowing. Eyes:  Negative for photophobia. Respiratory:  Negative for shortness of breath. Cardiovascular:  Negative for chest pain. Gastrointestinal:  Negative for abdominal pain. Endocrine: Negative for heat intolerance. Genitourinary:  Negative for difficulty urinating and flank pain. Musculoskeletal:  Positive for arthralgias and back pain. Negative for myalgias. Skin:  Negative for wound. Neurological:  Negative for weakness, numbness and headaches. Psychiatric/Behavioral:  Negative for confusion. Objective:   Physical Exam  HENT:      Head: Normocephalic. Eyes:      Pupils: Pupils are equal, round, and reactive to light. Cardiovascular:      Rate and Rhythm: Normal rate. Pulmonary:      Effort: Pulmonary effort is normal.   Abdominal:      General: There is no distension. Musculoskeletal:         General: Normal range of motion. Cervical back: Normal range of motion. Skin:     General: Skin is warm and dry. Neurological:      Mental Status: He is alert. Comments: A&Ox3  CN3-12 intact  Motor Strength full   Sensation intact to light touch   Reflexes normal   Tenderness to palpation of lower back   Psychiatric:         Thought Content: Thought content normal.       Imagin2023 CT Thoracic Spine   1.  New anterior wedge compression fracture involving

## 2023-09-18 ENCOUNTER — TELEPHONE (OUTPATIENT)
Dept: NEUROSURGERY | Age: 88
End: 2023-09-18

## 2023-09-18 NOTE — TELEPHONE ENCOUNTER
Contacted patients niece to reschedule his appointment due to Dr. Gustavo Bhat will be in surgery and joyce gibson became very belligerent on how the hospital and staffing  failed to follow up regarding patients care from the very beginning. Patients niece was insisting that I take someone else's appointment and move it and put her uncle in there so he did not get pushed back a week to be seen. Upon telling her that our office does not work like that she made a comment about me being stupid and how her uncle has been suffering and the emergency room said he was to have everything completed within (30) days. I advised her that this appointment was appropriate and then she commented about getting his MRI to go to another facility to have him seem. At the end of the conversation the joyce gibson accepted the appointment.

## 2023-09-25 ENCOUNTER — OFFICE VISIT (OUTPATIENT)
Dept: VASCULAR SURGERY | Age: 88
End: 2023-09-25
Payer: MEDICARE

## 2023-09-25 VITALS — BODY MASS INDEX: 22.62 KG/M2 | HEIGHT: 70 IN | WEIGHT: 158 LBS

## 2023-09-25 DIAGNOSIS — I71.23 ANEURYSM OF DESCENDING THORACIC AORTA WITHOUT RUPTURE (HCC): Primary | ICD-10-CM

## 2023-09-25 DIAGNOSIS — I71.9 PENETRATING ATHEROSCLEROTIC ULCER OF AORTA (HCC): ICD-10-CM

## 2023-09-25 PROCEDURE — 1123F ACP DISCUSS/DSCN MKR DOCD: CPT | Performed by: SURGERY

## 2023-09-25 PROCEDURE — 99213 OFFICE O/P EST LOW 20 MIN: CPT | Performed by: SURGERY

## 2023-09-25 RX ORDER — MONTELUKAST SODIUM 4 MG/1
TABLET, CHEWABLE ORAL
COMMUNITY
Start: 2023-09-08

## 2023-09-25 NOTE — PROGRESS NOTES
Vascular Surgery Outpatient Followup    PCP : Tish Stout DO    Previous Procedures  9/8/22 TEVAR, repair right common femoral artery      HISTORY OF PRESENT ILLNESS:    The patient is a 80 y.o. 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 Patient denies hx of abdominal pain or back pain. He has no claudication, rest pain or LE ulcerations. Dr Eva James did repair R inguinal hernia since last seen. He sees Dr Shyam Kellogg re compression fx in near future after fall in 8/2023.        Past Medical History:        Diagnosis Date    Acute basilic vein thrombosis, right 05/03/2022    Acute pancreatitis     6/06 AND 11/09    Anxiety     BPH (benign prostatic hyperplasia)     CAD (coronary artery disease)     COVID-19     GERD (gastroesophageal reflux disease)     Hyperlipidemia     Obsessive compulsive disorder     Psoriasis      Past Surgical History:        Procedure Laterality Date    CATARACT REMOVAL Bilateral 08/24/2017    COLONOSCOPY  03/14/2017    HERNIA REPAIR Left 10/25/2022    LAPAROSCOPIC ROBOTIC XI LEFT INGUINAL HERNIA REPAIR WITH MESH [address] Georgina Blanc MD [address] Right 7/11/2023    LAPAROSCOPIC ROBOTIC RIGHT INGUINAL HERNIA REPAIR WITH MESH performed by Georgina Blanc MD at [facility] N/A 9/8/2022    THORACIC AORTIC ANEURYSM REPAIR ENDOVASCULAR performed by Andrew Randolph MD at [facility]  03/14/2017     Current Medications:   Current Outpatient Medications   Medication Sig Dispense Refill    colestipol (COLESTID) 1 g tablet TAKE 1 TABLET BY MOUTH TWO TO FOUR TIMES DAILY AS NEEDED FOR LOOSE STOOLS      PARoxetine (PAXIL) 20 MG tablet TAKE 1 TABLET DAILY 90 tablet 3    mirtazapine (REMERON) 15 MG tablet TAKE 1 TABLET NIGHTLY 90 tablet 3    amLODIPine (NORVASC) 5 MG tablet Take 1 tablet by mouth daily 90 tablet 3    pantoprazole

## 2023-09-28 ENCOUNTER — OFFICE VISIT (OUTPATIENT)
Dept: NEUROSURGERY | Age: 88
End: 2023-09-28
Payer: MEDICARE

## 2023-09-28 VITALS — HEIGHT: 68 IN | WEIGHT: 158 LBS | RESPIRATION RATE: 16 BRPM | BODY MASS INDEX: 23.95 KG/M2

## 2023-09-28 DIAGNOSIS — S22.080A CLOSED WEDGE COMPRESSION FRACTURE OF T11 VERTEBRA, INITIAL ENCOUNTER (HCC): Primary | ICD-10-CM

## 2023-09-28 PROCEDURE — 99212 OFFICE O/P EST SF 10 MIN: CPT

## 2023-09-28 PROCEDURE — 99213 OFFICE O/P EST LOW 20 MIN: CPT | Performed by: NEUROLOGICAL SURGERY

## 2023-09-28 PROCEDURE — 1123F ACP DISCUSS/DSCN MKR DOCD: CPT | Performed by: NEUROLOGICAL SURGERY

## 2023-09-28 NOTE — PROGRESS NOTES
Patient is here for follow up consult for:T11 compression fracture. He is having severe back pain    Physical exam  Alert and Oriented X3  PERRLA, EOMI  HERNANDEZ 5/5  Sensation intact to LT and PP  Reflexes are 2+ and symmetric  Positive tenderness to palpation. A/P: patient is here for follow up for: back pain and compression fracture. His MRI shows an acute compression fracture at T11 with over 50% loss of height. He as tried oral medications and physical, therapy. He is unable to tolerate a brace due to severity of the pain. I am recommending a  T11 kyphoplasty. Risks, benefits and alternatives have mario discussed and he wishes to proceed.        Sophia Askew MD

## 2023-09-29 ENCOUNTER — PREP FOR PROCEDURE (OUTPATIENT)
Dept: NEUROSURGERY | Age: 88
End: 2023-09-29

## 2023-09-29 ENCOUNTER — TELEPHONE (OUTPATIENT)
Dept: NEUROSURGERY | Age: 88
End: 2023-09-29

## 2023-09-29 DIAGNOSIS — M48.54XA COMPRESSION FRACTURE OF THORACIC VERTEBRA, NON-TRAUMATIC, INITIAL ENCOUNTER (HCC): ICD-10-CM

## 2023-09-29 NOTE — TELEPHONE ENCOUNTER
Prior Authorization Form:      DEMOGRAPHICS:                     Patient Name:  Abel Martínez  Patient :  1935            Insurance:  Payor: Sidney Tejada / Plan: Jyoti Jacob ESSENTIAL/PLUS / Product Type: *No Product type* /   Insurance ID Number:    Payer/Plan Subscr  Sex Relation Sub. Ins. ID Effective Group Num   1.  BCBS MEDICAREAbel Martínez 1935 Male Self [id number] [id number]                                    [id number]         DIAGNOSIS & PROCEDURE:                       Procedure/Operation: T11 kyphoplasty           CPT Code: 42354    Diagnosis:  T11 compression fracture    ICD10 Code: M48.54XA    Location:  main    Surgeon:  Vinicio Haynes INFORMATION:                          Date: 10/11/23    Time: follow              Anesthesia:  [facility]                                                       Status:  Outpatient        Special Comments:  [employer]       Electronically signed by Lisa Lee MA on 2023 at 9:52 AM

## 2023-09-30 ENCOUNTER — PREP FOR PROCEDURE (OUTPATIENT)
Dept: NEUROSURGERY | Age: 88
End: 2023-09-30

## 2023-09-30 RX ORDER — SODIUM CHLORIDE 9 MG/ML
INJECTION, SOLUTION INTRAVENOUS CONTINUOUS
Status: CANCELLED | OUTPATIENT
Start: 2023-09-30

## 2023-09-30 RX ORDER — SODIUM CHLORIDE 9 MG/ML
INJECTION, SOLUTION INTRAVENOUS PRN
Status: CANCELLED | OUTPATIENT
Start: 2023-09-30

## 2023-09-30 RX ORDER — SODIUM CHLORIDE 0.9 % (FLUSH) 0.9 %
5-40 SYRINGE (ML) INJECTION EVERY 12 HOURS SCHEDULED
Status: CANCELLED | OUTPATIENT
Start: 2023-09-30

## 2023-09-30 RX ORDER — SODIUM CHLORIDE 0.9 % (FLUSH) 0.9 %
5-40 SYRINGE (ML) INJECTION PRN
Status: CANCELLED | OUTPATIENT
Start: 2023-09-30

## 2023-10-09 RX ORDER — ACETAMINOPHEN 325 MG/1
650 TABLET ORAL EVERY 4 HOURS PRN
COMMUNITY

## 2023-10-09 RX ORDER — CALCIUM CARBONATE 500 MG/1
1 TABLET, CHEWABLE ORAL EVERY 8 HOURS PRN
COMMUNITY

## 2023-10-10 NOTE — PROGRESS NOTES
Sergio Reyna, nurse caring for patient from [address] [place] [address] patient would like to know if he is to stop taking his colestipol prior to surgery. Discussed with her patient does not need to stop taking prior to surgery. Patient will not take morning of surgery. Jania verbalized understanding.

## 2023-10-10 NOTE — PROGRESS NOTES
Spoke with Swapnil Silverio, nurse caring for patient at [address] [place] [address] she states plan was for patient to discharge home yesterday 10/9 however the plan is now for him to discharge home on Saturday 10/14 after his scheduled surgery on 10/13. She provided the following information: She states he is 6'1 inches, weight 158 lbs. , patient does not have any open wounds is not on a ventilator,oxygen, and does not have a trach. Patient is in \"COVID isolation\".

## 2023-10-10 NOTE — PROGRESS NOTES
Received a fax from [address] [place] [address] requesting information regarding instructions for medications prior to scheduled surgery. Spoke with nurse caring for patient, Katie Buerger. She discussed she received the [facility] Pre -Admission Testing General Instructions. She wanted to confirm patient was to stop taking Ferous sulfate 5 days before scheduled surgery, discussed with her that is correct. She verbalized understanding states will hold starting today. She had no further questions regarding any other medications patient was to hold prior to scheduled surgery.

## 2023-10-10 NOTE — PROGRESS NOTES
[address]   PRE-ADMISSION TESTING GENERAL INSTRUCTIONS  [facility] Phone Number: [phone]      GENERAL INSTRUCTIONS:    [x] Antibacterial Soap Shower Night before and/or AM of surgery. [x] Do not wear makeup, lotions, powders, deodorant. [x] Nothing by mouth after midnight; including gum, candy, mints, or water. [x] You may brush your teeth, gargle, but do NOT swallow water. [x] No tobacco products, illegal drugs, marijuana or alcohol within 24 hours of your surgery. [x] Jewelry or valuables should not be brought to the hospital. All body and/or tongue piercing's must be removed prior to arriving to hospital. No contact lens or hair pins. [x] Arrange transportation with a responsible adult  to and from the hospital. Arrange for someone to be with you for the remainder of the day and for 24 hours after your procedure due to having had anesthesia. -Who will be your  for transportation? Family member to provide transportation to hospital and back to facility per nurse caring for patient at [address] [place] [address]       [x] Bring insurance card and photo ID. PARKING INSTRUCTIONS:     [x] ARRIVAL DATE  10/13 & TIME   10:30 am:   [x]Surgery time may change, if it does we will call you the business day before your scheduled surgery. [x] Enter into the [employer]. Two adults may accompany you. [x] Parking lot '\"I\"  is located on [facility] (the corner of [address] [place] [address] and [facility]). To enter the lot,you will need to get a parking ticket at the gate . To exit you will be given a free parking voucher. You will need both the ticket and parking voucher to exit the parking lot. One car per patient is allowed to park in this lot. Walk up the front walk to the [facility], the door will be locked an employee will greet you and let you in. EDUCATION INSTRUCTIONS:        [x] Pain: Post-op pain is normal and to be expected.  You will be asked to

## 2023-10-12 ENCOUNTER — ANESTHESIA EVENT (OUTPATIENT)
Dept: OPERATING ROOM | Age: 88
End: 2023-10-12
Payer: MEDICARE

## 2023-10-12 NOTE — PROGRESS NOTES
Notified RN at [facility], of time change of surgery to 1020 and to arrive by 0830. Also, I verified he is not in any type of isolation any longer.

## 2023-10-12 NOTE — H&P
Patient ID: Argenis Ram is a 80 y.o. male with a PMH of BPH, CAD, HTN and IBS who presents for evaluation of low back pain. Patient states he fell at home about 2 weeks ago and has had a sharp pain in his lower back that radiates into his right side of his stomach. He denies any numbness or weakness associated with this pain. He has tried oxycodone for the pain with mild relief. He denies bowel or bladder incontinence. Patient is a nonsmoker and takes ASA daily. CT scan reviewed with patient.      Past Medical History:   Diagnosis Date    Acute basilic vein thrombosis, right 05/03/2022    Acute pancreatitis     6/06 AND 11/09    Anxiety     Atherosclerotic heart disease of native coronary artery without angina pectoris     Information provided by [facility]    BPH (benign prostatic hyperplasia)     CAD (coronary artery disease)     Compression fracture of T12 vertebra ([address])     Information provided by [facility]    COVID-19     Diaphragmatic hernia without obstruction or gangrene     Information provided by [facility]    Difficulty walking     Information provided by [facility]    Dissection of thoracic aorta ([address])     Information provided by [facility]    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[address])     Information provided by [facility]    GERD (gastroesophageal reflux disease)     Hyperlipidemia     Irritable bowel syndrome with diarrhea     Information provided by [facility]    Major depressive disorder, single episode, unspecified     Information provided by [facility]    Muscle wasting and atrophy, not elsewhere classified, multiple sites     Information provided by [facility]    Obsessive compulsive disorder     Other lack of coordination     Information provided by [facility]    Psoriasis      Past Surgical History:   Procedure Laterality Date    CATARACT REMOVAL Bilateral 08/24/2017    COLONOSCOPY  03/14/2017    HERNIA REPAIR Left 10/25/2022    LAPAROSCOPIC ROBOTIC XI LEFT INGUINAL HERNIA the Last Year: Not on file     Number of Places Lived in the Last Year: Not on file     Unstable Housing in the Last Year: No     Family History   Problem Relation Age of Onset    Asthma Mother     Coronary Art Dis Mother     Parkinsonism Father      Scheduled Meds:  Continuous Infusions:  PRN Meds:. No Known Allergies       Review of Systems   Constitutional:  Negative for chills and fever. HENT:  Negative for trouble swallowing. Eyes:  Negative for photophobia. Respiratory:  Negative for shortness of breath. Cardiovascular:  Negative for chest pain. Gastrointestinal:  Negative for abdominal pain. Endocrine: Negative for heat intolerance. Genitourinary:  Negative for difficulty urinating and flank pain. Musculoskeletal:  Positive for arthralgias and back pain. Negative for myalgias. Skin:  Negative for wound. Neurological:  Negative for weakness, numbness and headaches. Psychiatric/Behavioral:  Negative for confusion. Objective:   Physical Exam  HENT:      Head: Normocephalic. Eyes:      Pupils: Pupils are equal, round, and reactive to light. Cardiovascular:      Rate and Rhythm: Normal rate. Pulmonary:      Effort: Pulmonary effort is normal.   Abdominal:      General: There is no distension. Musculoskeletal:         General: Normal range of motion. Cervical back: Normal range of motion. Skin:     General: Skin is warm and dry. Neurological:      Mental Status: He is alert. Comments: A&Ox3  CN3-12 intact  Motor Strength full   Sensation intact to light touch   Reflexes normal   Tenderness to palpation of lower back   Psychiatric:         Thought Content: Thought content normal.         Imagin2023 CT Thoracic Spine   1. New anterior wedge compression fracture involving T11 with loss of height of approximately 50%. MRI could be helpful for further evaluation. 2. Stable chronic fractures involving superior endplates of T2, T3, and T4.      Assessment:

## 2023-10-13 ENCOUNTER — HOSPITAL ENCOUNTER (OUTPATIENT)
Age: 88
Setting detail: OUTPATIENT SURGERY
Discharge: HOME | End: 2023-10-13
Attending: NEUROLOGICAL SURGERY | Admitting: NEUROLOGICAL SURGERY
Payer: MEDICARE

## 2023-10-13 ENCOUNTER — ANESTHESIA (OUTPATIENT)
Dept: OPERATING ROOM | Age: 88
End: 2023-10-13
Payer: MEDICARE

## 2023-10-13 ENCOUNTER — APPOINTMENT (OUTPATIENT)
Dept: GENERAL RADIOLOGY | Age: 88
End: 2023-10-13
Attending: NEUROLOGICAL SURGERY
Payer: MEDICARE

## 2023-10-13 VITALS
WEIGHT: 158 LBS | HEIGHT: 73 IN | RESPIRATION RATE: 18 BRPM | DIASTOLIC BLOOD PRESSURE: 79 MMHG | SYSTOLIC BLOOD PRESSURE: 157 MMHG | HEART RATE: 58 BPM | BODY MASS INDEX: 20.94 KG/M2 | OXYGEN SATURATION: 96 % | TEMPERATURE: 97.2 F

## 2023-10-13 DIAGNOSIS — M48.54XA COMPRESSION FRACTURE OF THORACIC VERTEBRA, NON-TRAUMATIC, INITIAL ENCOUNTER (HCC): ICD-10-CM

## 2023-10-13 PROCEDURE — 6360000002 HC RX W HCPCS: Performed by: NURSE ANESTHETIST, CERTIFIED REGISTERED

## 2023-10-13 PROCEDURE — 3700000000 HC ANESTHESIA ATTENDED CARE: Performed by: NEUROLOGICAL SURGERY

## 2023-10-13 PROCEDURE — 7100000011 HC PHASE II RECOVERY - ADDTL 15 MIN: Performed by: NEUROLOGICAL SURGERY

## 2023-10-13 PROCEDURE — 88307 TISSUE EXAM BY PATHOLOGIST: CPT

## 2023-10-13 PROCEDURE — 2709999900 HC NON-CHARGEABLE SUPPLY: Performed by: NEUROLOGICAL SURGERY

## 2023-10-13 PROCEDURE — 2720000010 HC SURG SUPPLY STERILE: Performed by: NEUROLOGICAL SURGERY

## 2023-10-13 PROCEDURE — 7100000010 HC PHASE II RECOVERY - FIRST 15 MIN: Performed by: NEUROLOGICAL SURGERY

## 2023-10-13 PROCEDURE — 3600000014 HC SURGERY LEVEL 4 ADDTL 15MIN: Performed by: NEUROLOGICAL SURGERY

## 2023-10-13 PROCEDURE — 6360000002 HC RX W HCPCS: Performed by: STUDENT IN AN ORGANIZED HEALTH CARE EDUCATION/TRAINING PROGRAM

## 2023-10-13 PROCEDURE — 6360000002 HC RX W HCPCS: Performed by: NEUROLOGICAL SURGERY

## 2023-10-13 PROCEDURE — 22513 PERQ VERTEBRAL AUGMENTATION: CPT | Performed by: NEUROLOGICAL SURGERY

## 2023-10-13 PROCEDURE — 3600000004 HC SURGERY LEVEL 4 BASE: Performed by: NEUROLOGICAL SURGERY

## 2023-10-13 PROCEDURE — 2500000003 HC RX 250 WO HCPCS: Performed by: NEUROLOGICAL SURGERY

## 2023-10-13 PROCEDURE — 3700000001 HC ADD 15 MINUTES (ANESTHESIA): Performed by: NEUROLOGICAL SURGERY

## 2023-10-13 PROCEDURE — C1894 INTRO/SHEATH, NON-LASER: HCPCS | Performed by: NEUROLOGICAL SURGERY

## 2023-10-13 PROCEDURE — 2580000003 HC RX 258: Performed by: STUDENT IN AN ORGANIZED HEALTH CARE EDUCATION/TRAINING PROGRAM

## 2023-10-13 PROCEDURE — C1713 ANCHOR/SCREW BN/BN,TIS/BN: HCPCS | Performed by: NEUROLOGICAL SURGERY

## 2023-10-13 PROCEDURE — 88311 DECALCIFY TISSUE: CPT

## 2023-10-13 DEVICE — CEMENT C01A KYPHX HV-R BONE CEMENT EN
Type: IMPLANTABLE DEVICE | Status: FUNCTIONAL
Brand: KYPHON® HV-R® BONE CEMENT

## 2023-10-13 RX ORDER — LIDOCAINE HYDROCHLORIDE AND EPINEPHRINE 10; 10 MG/ML; UG/ML
INJECTION, SOLUTION INFILTRATION; PERINEURAL PRN
Status: DISCONTINUED | OUTPATIENT
Start: 2023-10-13 | End: 2023-10-13 | Stop reason: ALTCHOICE

## 2023-10-13 RX ORDER — SODIUM CHLORIDE 9 MG/ML
INJECTION, SOLUTION INTRAVENOUS PRN
Status: DISCONTINUED | OUTPATIENT
Start: 2023-10-13 | End: 2023-10-13 | Stop reason: HOSPADM

## 2023-10-13 RX ORDER — BUPIVACAINE HYDROCHLORIDE 2.5 MG/ML
INJECTION, SOLUTION EPIDURAL; INFILTRATION; INTRACAUDAL PRN
Status: DISCONTINUED | OUTPATIENT
Start: 2023-10-13 | End: 2023-10-13 | Stop reason: ALTCHOICE

## 2023-10-13 RX ORDER — SODIUM CHLORIDE 9 MG/ML
INJECTION, SOLUTION INTRAVENOUS CONTINUOUS
Status: DISCONTINUED | OUTPATIENT
Start: 2023-10-13 | End: 2023-10-13 | Stop reason: HOSPADM

## 2023-10-13 RX ORDER — ONDANSETRON 2 MG/ML
4 INJECTION INTRAMUSCULAR; INTRAVENOUS
Status: DISCONTINUED | OUTPATIENT
Start: 2023-10-13 | End: 2023-10-13 | Stop reason: HOSPADM

## 2023-10-13 RX ORDER — FENTANYL CITRATE 50 UG/ML
25 INJECTION, SOLUTION INTRAMUSCULAR; INTRAVENOUS EVERY 5 MIN PRN
Status: DISCONTINUED | OUTPATIENT
Start: 2023-10-13 | End: 2023-10-13 | Stop reason: HOSPADM

## 2023-10-13 RX ORDER — TRAMADOL HYDROCHLORIDE 50 MG/1
50 TABLET ORAL EVERY 4 HOURS PRN
Qty: 42 TABLET | Refills: 0 | Status: SHIPPED | OUTPATIENT
Start: 2023-10-13 | End: 2023-10-20

## 2023-10-13 RX ORDER — SODIUM CHLORIDE 0.9 % (FLUSH) 0.9 %
5-40 SYRINGE (ML) INJECTION PRN
Status: DISCONTINUED | OUTPATIENT
Start: 2023-10-13 | End: 2023-10-13 | Stop reason: HOSPADM

## 2023-10-13 RX ORDER — PROPOFOL 10 MG/ML
INJECTION, EMULSION INTRAVENOUS CONTINUOUS PRN
Status: DISCONTINUED | OUTPATIENT
Start: 2023-10-13 | End: 2023-10-13 | Stop reason: SDUPTHER

## 2023-10-13 RX ORDER — FENTANYL CITRATE 50 UG/ML
INJECTION, SOLUTION INTRAMUSCULAR; INTRAVENOUS PRN
Status: DISCONTINUED | OUTPATIENT
Start: 2023-10-13 | End: 2023-10-13 | Stop reason: SDUPTHER

## 2023-10-13 RX ORDER — SODIUM CHLORIDE 0.9 % (FLUSH) 0.9 %
5-40 SYRINGE (ML) INJECTION EVERY 12 HOURS SCHEDULED
Status: DISCONTINUED | OUTPATIENT
Start: 2023-10-13 | End: 2023-10-13 | Stop reason: HOSPADM

## 2023-10-13 RX ORDER — PHENYLEPHRINE HCL IN 0.9% NACL 1 MG/10 ML
SYRINGE (ML) INTRAVENOUS PRN
Status: DISCONTINUED | OUTPATIENT
Start: 2023-10-13 | End: 2023-10-13 | Stop reason: SDUPTHER

## 2023-10-13 RX ORDER — MIDAZOLAM HYDROCHLORIDE 1 MG/ML
INJECTION INTRAMUSCULAR; INTRAVENOUS PRN
Status: DISCONTINUED | OUTPATIENT
Start: 2023-10-13 | End: 2023-10-13 | Stop reason: SDUPTHER

## 2023-10-13 RX ORDER — ASPIRIN 81 MG/1
81 TABLET ORAL DAILY
COMMUNITY

## 2023-10-13 RX ADMIN — Medication 100 MCG: at 09:56

## 2023-10-13 RX ADMIN — SODIUM CHLORIDE: 9 INJECTION, SOLUTION INTRAVENOUS at 09:30

## 2023-10-13 RX ADMIN — PROPOFOL 75 MCG/KG/MIN: 10 INJECTION, EMULSION INTRAVENOUS at 09:37

## 2023-10-13 RX ADMIN — CEFAZOLIN 2000 MG: 2 INJECTION, POWDER, FOR SOLUTION INTRAMUSCULAR; INTRAVENOUS at 09:41

## 2023-10-13 RX ADMIN — MIDAZOLAM 1 MG: 1 INJECTION INTRAMUSCULAR; INTRAVENOUS at 09:32

## 2023-10-13 RX ADMIN — FENTANYL CITRATE 50 MCG: 50 INJECTION, SOLUTION INTRAMUSCULAR; INTRAVENOUS at 09:32

## 2023-10-13 ASSESSMENT — PAIN - FUNCTIONAL ASSESSMENT: PAIN_FUNCTIONAL_ASSESSMENT: NONE - DENIES PAIN

## 2023-10-13 NOTE — OP NOTE
[address], [address] [place] [address]                                OPERATIVE REPORT    PATIENT NAME: Tiff Zepeda                       :        1935  MED REC NO:   [id number]                            ROOM:  ACCOUNT NO:   [de-identified]                           ADMIT DATE: 10/13/2023  PROVIDER:     Yeol Mckenna MD    DATE OF PROCEDURE:  10/13/2023    PREOPERATIVE DIAGNOSIS:  Acute T11 osteoporotic compression fracture. POSTOPERATIVE DIAGNOSIS:  Acute T11 osteoporotic compression fracture. PROCEDURES:  1.  Left-sided unilateral percutaneous transpedicular approach to T11  vertebral body for T11 vertebral body bone biopsy and T11 vertebral body  balloon kyphoplasty with use of Medtronic Kyphon balloon  polymethylmethacrylate. 2.  Use of biplanar fluoroscopy interpreted by myself, the surgeon. ANESTHESIA:  Monitored anesthesia care. SURGEON:  Yoel Mckenna MD    ASSISTANT:  Virgilio Ingram DO    COMPLICATIONS:  None. ESTIMATED BLOOD LOSS:  Minimal.    SPECIMENS:  Bone. OPERATIVE INDICATIONS:  The patient is an 80-year-old gentleman who  presented to the office complaining of back pain. He was found to have  an acute T11 compression fracture. He has failed conservative therapy,  and after risks, benefits and alternatives were discussed with the  patient, it was determined that he would undergo the  above-listed procedure. DESCRIPTION OF PROCEDURE:  The patient was brought into the operating  room. A time-out was performed where he was identified by his name,  medical record number, and the operative procedure, which he was about  to undergo. Next, induction of monitored anesthesia care was then  commenced. Upon completion of induction of monitored anesthesia care,  he received preoperative antibiotics. He was flipped into prone  position on a Darryl table. All pressure points were padded.

## 2023-10-13 NOTE — BRIEF OP NOTE
Brief Postoperative Note      Patient: Arnulfo Green  YOB: 1935  MRN: [id number]    Date of Procedure: 10/13/2023    Pre-Op Diagnosis Codes:     * Compression fracture of thoracic vertebra, non-traumatic, initial encounter ([address]) [A75.73BM]    Post-Op Diagnosis: Same       Procedure(s):  T11 KYPHOPLASTY    Surgeon(s):  Haley Cho MD    Assistant:  Resident: Elizabeth Mckee DO    Anesthesia: Monitor Anesthesia Care    Estimated Blood Loss (mL): Minimal    Complications: None    Specimens:   ID Type Source Tests Collected by Time Destination   A : T11 BONE BIOPSY Tissue Spine SURGICAL PATHOLOGY Haley Cho MD 10/13/2023 5527        Implants:  Implant Name Type Inv.  Item Serial No.  Lot No. LRB No. Used Action   CEMENT BNE HI VISC RADIOPAQUE KYPHON HV-R - [id number]  CEMENT BNE HI VISC RADIOPAQUE KYPHON HV-R  [facility] [id number]  1 Implanted         Drains: * No LDAs found *    Findings: see dictated op note      Electronically signed by Herold Opitz, MD on 10/13/2023 at 10:20 AM

## 2023-10-18 LAB — SURGICAL PATHOLOGY REPORT: NORMAL

## 2023-10-20 ENCOUNTER — TELEPHONE (OUTPATIENT)
Dept: NEUROSURGERY | Age: 88
End: 2023-10-20

## 2023-10-20 NOTE — TELEPHONE ENCOUNTER
Patient called, LVALEJANDRO asking if he is able to drive beginning today. Reports he has his 1 Month follow up on 11/7/23, but wanted to know if he can drive prior to that visit.

## 2023-10-20 NOTE — TELEPHONE ENCOUNTER
Please call the patient and inform him that he is able to drive as tolerated from a neurosurgery standpoint, as long as he is not having neurologic symptoms or taking narcotic medications.

## 2023-11-01 PROBLEM — S22.080D COMPRESSION FRACTURE OF T11 VERTEBRA WITH ROUTINE HEALING: Status: ACTIVE | Noted: 2023-09-29

## 2023-11-07 ENCOUNTER — OFFICE VISIT (OUTPATIENT)
Dept: NEUROSURGERY | Age: 88
End: 2023-11-07
Payer: MEDICARE

## 2023-11-07 VITALS
RESPIRATION RATE: 18 BRPM | DIASTOLIC BLOOD PRESSURE: 62 MMHG | WEIGHT: 154 LBS | TEMPERATURE: 97.2 F | BODY MASS INDEX: 20.41 KG/M2 | HEIGHT: 73 IN | SYSTOLIC BLOOD PRESSURE: 122 MMHG | HEART RATE: 58 BPM | OXYGEN SATURATION: 98 %

## 2023-11-07 DIAGNOSIS — S22.080D COMPRESSION FRACTURE OF T11 VERTEBRA WITH ROUTINE HEALING: Primary | ICD-10-CM

## 2023-11-07 DIAGNOSIS — Z98.890 S/P KYPHOPLASTY: ICD-10-CM

## 2023-11-07 PROCEDURE — 99024 POSTOP FOLLOW-UP VISIT: CPT | Performed by: STUDENT IN AN ORGANIZED HEALTH CARE EDUCATION/TRAINING PROGRAM

## 2023-11-07 PROCEDURE — 99212 OFFICE O/P EST SF 10 MIN: CPT

## 2023-11-07 RX ORDER — METHYLPREDNISOLONE 4 MG/1
TABLET ORAL
Qty: 1 KIT | Refills: 0 | Status: SHIPPED | OUTPATIENT
Start: 2023-11-07

## 2023-11-07 NOTE — PROGRESS NOTES
Post-Operative Follow-up     This is a 80year old male who presents to the office for a 1 month follow-up s/p T11 kyphoplasty      Subjective: Patient states he is doing okay. He states his pain is better than prior to surgery, but does admit to some soreness in his back. No pain down the legs. No numbness or weakness. Physical Exam:              WDWN, no apparent distress              Non-labored breathing               Vitals Stable              Alert and oriented x3              CN 3-12 intact              PERRL              EOMI              HERNANDEZ well              Motor strength symmetric              Sensation to LT intact bilaterally    Assessment: This is a 80 y.o.  male presenting for a 1 month follow-up s/p T11 kyphoplasty      Plan:  -Pain control and expectations discussed, medrol dose jasper prescribed   -No restrictions   -OARRS report reviewed   -Follow-up in neurosurgery clinic prn  -Call or return to neurosurgery office sooner if symptoms worsen or if new issues arise in the interim.     Electronically signed by Jeannie Way PA-C on 11/7/2023 at 4:04 PM

## 2024-03-22 PROBLEM — S22.080D COMPRESSION FRACTURE OF T11 VERTEBRA WITH ROUTINE HEALING: Status: RESOLVED | Noted: 2023-09-29 | Resolved: 2024-03-22

## 2024-04-25 ENCOUNTER — TELEPHONE (OUTPATIENT)
Dept: CARDIOLOGY CLINIC | Age: 89
End: 2024-04-25

## 2024-04-26 ENCOUNTER — OFFICE VISIT (OUTPATIENT)
Dept: CARDIOLOGY CLINIC | Age: 89
End: 2024-04-26
Payer: MEDICARE

## 2024-04-26 ENCOUNTER — TELEPHONE (OUTPATIENT)
Dept: CARDIOLOGY | Age: 89
End: 2024-04-26

## 2024-04-26 VITALS
HEART RATE: 60 BPM | BODY MASS INDEX: 21.76 KG/M2 | WEIGHT: 152 LBS | RESPIRATION RATE: 16 BRPM | DIASTOLIC BLOOD PRESSURE: 54 MMHG | HEIGHT: 70 IN | OXYGEN SATURATION: 95 % | SYSTOLIC BLOOD PRESSURE: 102 MMHG

## 2024-04-26 DIAGNOSIS — I71.9 ULCER OF AORTA (HCC): Primary | ICD-10-CM

## 2024-04-26 DIAGNOSIS — R06.09 DYSPNEA ON EXERTION: ICD-10-CM

## 2024-04-26 PROCEDURE — 99204 OFFICE O/P NEW MOD 45 MIN: CPT | Performed by: INTERNAL MEDICINE

## 2024-04-26 PROCEDURE — 1123F ACP DISCUSS/DSCN MKR DOCD: CPT | Performed by: INTERNAL MEDICINE

## 2024-04-26 PROCEDURE — 93000 ELECTROCARDIOGRAM COMPLETE: CPT | Performed by: INTERNAL MEDICINE

## 2024-04-26 RX ORDER — REGADENOSON 0.08 MG/ML
0.4 INJECTION, SOLUTION INTRAVENOUS
OUTPATIENT
Start: 2024-04-26

## 2024-04-26 ASSESSMENT — ENCOUNTER SYMPTOMS
SHORTNESS OF BREATH: 1
BLOOD IN STOOL: 0
ABDOMINAL PAIN: 0
VOMITING: 0
CHEST TIGHTNESS: 0
COUGH: 0
BACK PAIN: 0
NAUSEA: 0

## 2024-04-26 NOTE — PROGRESS NOTES
[facility] Cardiology  [address] [facility] [address]  [phone]

## 2024-04-26 NOTE — TELEPHONE ENCOUNTER
Spoke to the patient in person after his cardiology appt. Regarding his stress test instructions He was instructed on NPO after MN on Sunday night except meds with water and to avoid caffeine products for 12 hours prior to his stress test. He verbalized an understanding. He is aware he will be coming here to the same office for the test.

## 2024-04-29 ENCOUNTER — TELEPHONE (OUTPATIENT)
Dept: CARDIOLOGY | Age: 89
End: 2024-04-29

## 2024-04-29 NOTE — TELEPHONE ENCOUNTER
LM to cancel stress test and reschedule as there is no doc today.    Electronically signed by Sofi Farah on 4/29/2024 at 8:21 AM

## 2024-05-06 ENCOUNTER — TELEPHONE (OUTPATIENT)
Dept: CARDIOLOGY | Age: 89
End: 2024-05-06

## 2024-05-06 NOTE — TELEPHONE ENCOUNTER
Spoke with patient and confirmed chemical stress test appointment on 5/8/24 at 0945. Instructions for test reviewed with patient, questions answered. Patient verbalized understanding.

## 2024-05-08 ENCOUNTER — HOSPITAL ENCOUNTER (OUTPATIENT)
Dept: CARDIOLOGY | Age: 89
Discharge: HOME | End: 2024-05-10
Attending: INTERNAL MEDICINE
Payer: MEDICARE

## 2024-05-08 VITALS
SYSTOLIC BLOOD PRESSURE: 134 MMHG | WEIGHT: 148 LBS | RESPIRATION RATE: 18 BRPM | HEART RATE: 52 BPM | HEIGHT: 70 IN | DIASTOLIC BLOOD PRESSURE: 72 MMHG | BODY MASS INDEX: 21.19 KG/M2

## 2024-05-08 VITALS
WEIGHT: 152 LBS | SYSTOLIC BLOOD PRESSURE: 102 MMHG | HEIGHT: 70 IN | BODY MASS INDEX: 21.76 KG/M2 | DIASTOLIC BLOOD PRESSURE: 54 MMHG

## 2024-05-08 DIAGNOSIS — R06.09 DYSPNEA ON EXERTION: ICD-10-CM

## 2024-05-08 LAB
ECHO BSA: 1.85 M2
STRESS BASELINE DIAS BP: 72 MMHG
STRESS BASELINE HR: 51 BPM
STRESS BASELINE SYS BP: 134 MMHG
STRESS ESTIMATED WORKLOAD: 1 METS
STRESS O2 SAT REST: 97 %
STRESS PEAK DIAS BP: 70 MMHG
STRESS PEAK SYS BP: 120 MMHG
STRESS PERCENT HR ACHIEVED: 51 %
STRESS POST PEAK HR: 67 BPM
STRESS RATE PRESSURE PRODUCT: 8040 BPM*MMHG
STRESS TARGET HR: 132 BPM

## 2024-05-08 PROCEDURE — 6360000002 HC RX W HCPCS: Performed by: INTERNAL MEDICINE

## 2024-05-08 PROCEDURE — 2580000003 HC RX 258: Performed by: INTERNAL MEDICINE

## 2024-05-08 PROCEDURE — 93017 CV STRESS TEST TRACING ONLY: CPT

## 2024-05-08 PROCEDURE — A9502 TC99M TETROFOSMIN: HCPCS | Performed by: INTERNAL MEDICINE

## 2024-05-08 PROCEDURE — 93018 CV STRESS TEST I&R ONLY: CPT | Performed by: INTERNAL MEDICINE

## 2024-05-08 PROCEDURE — 3430000000 HC RX DIAGNOSTIC RADIOPHARMACEUTICAL: Performed by: INTERNAL MEDICINE

## 2024-05-08 PROCEDURE — 93016 CV STRESS TEST SUPVJ ONLY: CPT | Performed by: INTERNAL MEDICINE

## 2024-05-08 PROCEDURE — 78452 HT MUSCLE IMAGE SPECT MULT: CPT | Performed by: INTERNAL MEDICINE

## 2024-05-08 PROCEDURE — 93306 TTE W/DOPPLER COMPLETE: CPT

## 2024-05-08 RX ORDER — SODIUM CHLORIDE 0.9 % (FLUSH) 0.9 %
10 SYRINGE (ML) INJECTION PRN
Status: DISCONTINUED | OUTPATIENT
Start: 2024-05-08 | End: 2024-05-11 | Stop reason: HOSPADM

## 2024-05-08 RX ORDER — REGADENOSON 0.08 MG/ML
0.4 INJECTION, SOLUTION INTRAVENOUS
Status: COMPLETED | OUTPATIENT
Start: 2024-05-08 | End: 2024-05-08

## 2024-05-08 RX ADMIN — TETROFOSMIN 27.2 MILLICURIE: 0.23 INJECTION, POWDER, LYOPHILIZED, FOR SOLUTION INTRAVENOUS at 11:02

## 2024-05-08 RX ADMIN — TETROFOSMIN 8.8 MILLICURIE: 0.23 INJECTION, POWDER, LYOPHILIZED, FOR SOLUTION INTRAVENOUS at 09:49

## 2024-05-08 RX ADMIN — REGADENOSON 0.4 MG: 0.08 INJECTION, SOLUTION INTRAVENOUS at 11:02

## 2024-05-08 RX ADMIN — SODIUM CHLORIDE, PRESERVATIVE FREE 10 ML: 5 INJECTION INTRAVENOUS at 09:49

## 2024-05-08 RX ADMIN — SODIUM CHLORIDE, PRESERVATIVE FREE 10 ML: 5 INJECTION INTRAVENOUS at 11:02

## 2024-05-09 LAB
ECHO AV AREA PEAK VELOCITY: 2.7 CM2
ECHO AV AREA VTI: 2.8 CM2
ECHO AV AREA/BSA PEAK VELOCITY: 1.5 CM2/M2
ECHO AV AREA/BSA VTI: 1.5 CM2/M2
ECHO AV CUSP MM: 1.7 CM
ECHO AV MEAN GRADIENT: 3 MMHG
ECHO AV MEAN VELOCITY: 0.9 M/S
ECHO AV PEAK GRADIENT: 6 MMHG
ECHO AV PEAK VELOCITY: 1.3 M/S
ECHO AV VELOCITY RATIO: 0.69
ECHO AV VTI: 27.9 CM
ECHO BSA: 1.85 M2
ECHO LA DIAMETER INDEX: 2.15 CM/M2
ECHO LA DIAMETER: 4 CM
ECHO LA VOL A-L A2C: 47 ML (ref 18–58)
ECHO LA VOL A-L A4C: 28 ML (ref 18–58)
ECHO LA VOL MOD A2C: 46 ML (ref 18–58)
ECHO LA VOL MOD A4C: 26 ML (ref 18–58)
ECHO LA VOLUME AREA LENGTH: 39 ML
ECHO LA VOLUME INDEX A-L A2C: 25 ML/M2 (ref 16–34)
ECHO LA VOLUME INDEX A-L A4C: 15 ML/M2 (ref 16–34)
ECHO LA VOLUME INDEX AREA LENGTH: 21 ML/M2 (ref 16–34)
ECHO LA VOLUME INDEX MOD A2C: 25 ML/M2 (ref 16–34)
ECHO LA VOLUME INDEX MOD A4C: 14 ML/M2 (ref 16–34)
ECHO LV FRACTIONAL SHORTENING: 40 % (ref 28–44)
ECHO LV INTERNAL DIMENSION DIASTOLE INDEX: 2.42 CM/M2
ECHO LV INTERNAL DIMENSION DIASTOLIC: 4.5 CM (ref 4.2–5.9)
ECHO LV INTERNAL DIMENSION SYSTOLIC INDEX: 1.45 CM/M2
ECHO LV INTERNAL DIMENSION SYSTOLIC: 2.7 CM
ECHO LV IVSD: 1.1 CM (ref 0.6–1)
ECHO LV MASS 2D: 175 G (ref 88–224)
ECHO LV MASS INDEX 2D: 94.1 G/M2 (ref 49–115)
ECHO LV POSTERIOR WALL DIASTOLIC: 1.1 CM (ref 0.6–1)
ECHO LV RELATIVE WALL THICKNESS RATIO: 0.49
ECHO LVOT AREA: 3.8 CM2
ECHO LVOT AV VTI INDEX: 0.75
ECHO LVOT DIAM: 2.2 CM
ECHO LVOT MEAN GRADIENT: 2 MMHG
ECHO LVOT PEAK GRADIENT: 3 MMHG
ECHO LVOT PEAK VELOCITY: 0.9 M/S
ECHO LVOT STROKE VOLUME INDEX: 42.5 ML/M2
ECHO LVOT SV: 79 ML
ECHO LVOT VTI: 20.8 CM
ECHO MV A VELOCITY: 0.97 M/S
ECHO MV AREA PHT: 3.2 CM2
ECHO MV AREA VTI: 2.6 CM2
ECHO MV E DECELERATION TIME (DT): 196.4 MS
ECHO MV E VELOCITY: 0.84 M/S
ECHO MV E/A RATIO: 0.87
ECHO MV LVOT VTI INDEX: 1.44
ECHO MV MAX VELOCITY: 0.9 M/S
ECHO MV MEAN GRADIENT: 1 MMHG
ECHO MV MEAN VELOCITY: 0.5 M/S
ECHO MV PEAK GRADIENT: 3 MMHG
ECHO MV PRESSURE HALF TIME (PHT): 68.4 MS
ECHO MV VTI: 29.9 CM
ECHO RV INTERNAL DIMENSION: 2.5 CM

## 2024-05-29 ENCOUNTER — TELEPHONE (OUTPATIENT)
Dept: CARDIOLOGY CLINIC | Age: 89
End: 2024-05-29

## 2024-05-29 NOTE — TELEPHONE ENCOUNTER
Patient calling for stress and ECHO results       He has also requested information to be faxed to  PCP     [phone] (done)

## 2024-06-06 ENCOUNTER — TELEPHONE (OUTPATIENT)
Dept: CARDIOLOGY CLINIC | Age: 89
End: 2024-06-06

## 2024-06-06 NOTE — TELEPHONE ENCOUNTER
Dr. Hussein,     Patient would like to know if you can call and give him his ECHO and Stress test results.  [phone]. I had to cancel his appointment, for 6/28/24. He will be fine with a July appointment if he can get his results.

## 2024-06-19 PROBLEM — I71.9 PENETRATING ATHEROSCLEROTIC ULCER OF AORTA (HCC): Status: RESOLVED | Noted: 2022-08-01 | Resolved: 2024-06-19

## 2024-06-19 PROBLEM — K40.90 RIGHT INGUINAL HERNIA: Status: RESOLVED | Noted: 2023-04-05 | Resolved: 2024-06-19

## 2024-08-19 DIAGNOSIS — I71.23 ANEURYSM OF DESCENDING THORACIC AORTA WITHOUT RUPTURE (HCC): Primary | ICD-10-CM

## 2024-08-22 ENCOUNTER — TELEPHONE (OUTPATIENT)
Dept: VASCULAR SURGERY | Age: 89
End: 2024-08-22

## 2024-08-22 NOTE — TELEPHONE ENCOUNTER
Scheduled CTA chest at [facility] 9/11/24 at 10:30 a.m.  Message left on pt's answering machine to report to register at 10:00 a.m, be NPO x 3 hours prior and to have blood work drawn at [facility].

## 2024-09-19 RX ORDER — FUROSEMIDE 20 MG
20 TABLET ORAL DAILY
Qty: 90 TABLET | Refills: 1 | Status: SHIPPED | OUTPATIENT
Start: 2024-09-19

## 2024-09-24 ENCOUNTER — TELEPHONE (OUTPATIENT)
Dept: VASCULAR SURGERY | Age: 89
End: 2024-09-24

## 2024-10-15 ENCOUNTER — HOSPITAL ENCOUNTER (OUTPATIENT)
Age: 89
Discharge: HOME | End: 2024-10-15
Payer: MEDICARE

## 2024-10-15 DIAGNOSIS — I71.23 ANEURYSM OF DESCENDING THORACIC AORTA WITHOUT RUPTURE (HCC): ICD-10-CM

## 2024-10-15 LAB
ANION GAP SERPL CALCULATED.3IONS-SCNC: 15 MMOL/L (ref 7–16)
BUN SERPL-MCNC: 28 MG/DL (ref 6–23)
CALCIUM SERPL-MCNC: 9.5 MG/DL (ref 8.6–10.2)
CHLORIDE SERPL-SCNC: 105 MMOL/L (ref 98–107)
CO2 SERPL-SCNC: 21 MMOL/L (ref 22–29)
CREAT SERPL-MCNC: 1.6 MG/DL (ref 0.7–1.2)
GFR, ESTIMATED: 40 ML/MIN/1.73M2
GLUCOSE SERPL-MCNC: 84 MG/DL (ref 74–99)
POTASSIUM SERPL-SCNC: 4.3 MMOL/L (ref 3.5–5)
SODIUM SERPL-SCNC: 141 MMOL/L (ref 132–146)

## 2024-10-15 PROCEDURE — 80048 BASIC METABOLIC PNL TOTAL CA: CPT

## 2024-10-15 PROCEDURE — 36415 COLL VENOUS BLD VENIPUNCTURE: CPT

## 2024-10-17 ENCOUNTER — HOSPITAL ENCOUNTER (OUTPATIENT)
Dept: CT IMAGING | Age: 89
Discharge: HOME | End: 2024-10-19
Attending: SURGERY
Payer: MEDICARE

## 2024-10-17 DIAGNOSIS — I71.23 ANEURYSM OF DESCENDING THORACIC AORTA WITHOUT RUPTURE (HCC): ICD-10-CM

## 2024-10-17 PROCEDURE — 71275 CT ANGIOGRAPHY CHEST: CPT

## 2024-10-17 PROCEDURE — 6360000004 HC RX CONTRAST MEDICATION: Performed by: RADIOLOGY

## 2024-10-17 RX ORDER — IOPAMIDOL 755 MG/ML
75 INJECTION, SOLUTION INTRAVASCULAR
Status: COMPLETED | OUTPATIENT
Start: 2024-10-17 | End: 2024-10-17

## 2024-10-17 RX ADMIN — IOPAMIDOL 75 ML: 755 INJECTION, SOLUTION INTRAVENOUS at 14:09

## 2024-10-21 ENCOUNTER — OFFICE VISIT (OUTPATIENT)
Dept: VASCULAR SURGERY | Age: 89
End: 2024-10-21
Payer: MEDICARE

## 2024-10-21 DIAGNOSIS — I71.23 ANEURYSM OF DESCENDING THORACIC AORTA WITHOUT RUPTURE (HCC): ICD-10-CM

## 2024-10-21 DIAGNOSIS — I71.9 PENETRATING ATHEROSCLEROTIC ULCER OF AORTA (HCC): Primary | ICD-10-CM

## 2024-10-21 PROCEDURE — 1123F ACP DISCUSS/DSCN MKR DOCD: CPT | Performed by: PHYSICIAN ASSISTANT

## 2024-10-21 PROCEDURE — 99212 OFFICE O/P EST SF 10 MIN: CPT | Performed by: PHYSICIAN ASSISTANT

## 2024-10-21 NOTE — PROGRESS NOTES
Vascular Surgery Outpatient Followup    PCP : Jesus Kenney, DO    Previous Procedures  9/8/22 TEVAR, repair right common femoral artery      HISTORY OF PRESENT ILLNESS:    The patient is a 89 y.o. 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      Patient denies hx of abdominal pain or new back pain. He does have chronic low back pain when he stands for long periods of time. He sees Dr Christiano torres in near future after fall in 8/2023.      He has no claudication, rest pain or LE ulcerations.    Past Medical History:        Diagnosis Date    Acute basilic vein thrombosis, right 05/03/2022    Acute pancreatitis     6/06 AND 11/09    Anxiety     Atherosclerotic heart disease of native coronary artery without angina pectoris     Information provided by [email or web address]    BPH (benign prostatic hyperplasia)     CAD (coronary artery disease)     Compression fracture of T12 vertebra (HCC)     Information provided by [email or web address]    COVID-19     Diaphragmatic hernia without obstruction or gangrene     Information provided by [email or web address]    Difficulty walking     Information provided by [email or web address]    Dissection of thoracic aorta (HCC)     Information provided by [email or web address]    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HCC)     Information provided by [employer]    GERD (gastroesophageal reflux disease)     Hyperlipidemia     Irritable bowel syndrome with diarrhea     Information provided by [email or web address]    Major depressive disorder, single episode, unspecified     Information provided by [email or web address]    Muscle wasting and atrophy, not elsewhere classified, multiple sites     Information provided by [email or web address]    Obsessive compulsive disorder     Other lack of coordination     Information provided by [email or web address]    Psoriasis      Past Surgical History:        Procedure Laterality Date    CATARACT REMOVAL Bilateral 08/24/2017    COLONOSCOPY  03/14/2017    HERNIA

## 2025-01-28 ENCOUNTER — APPOINTMENT (OUTPATIENT)
Dept: CT IMAGING | Age: 89
End: 2025-01-28
Payer: MEDICARE

## 2025-01-28 ENCOUNTER — HOSPITAL ENCOUNTER (OUTPATIENT)
Age: 89
Setting detail: OBSERVATION
Discharge: HOME | End: 2025-01-29
Attending: EMERGENCY MEDICINE | Admitting: INTERNAL MEDICINE
Payer: MEDICARE

## 2025-01-28 ENCOUNTER — APPOINTMENT (OUTPATIENT)
Dept: GENERAL RADIOLOGY | Age: 89
End: 2025-01-28
Payer: MEDICARE

## 2025-01-28 ENCOUNTER — APPOINTMENT (OUTPATIENT)
Age: 89
End: 2025-01-28
Attending: INTERNAL MEDICINE
Payer: MEDICARE

## 2025-01-28 DIAGNOSIS — S09.90XA INJURY OF HEAD, INITIAL ENCOUNTER: Primary | ICD-10-CM

## 2025-01-28 DIAGNOSIS — S01.01XA LACERATION OF SCALP, INITIAL ENCOUNTER: ICD-10-CM

## 2025-01-28 DIAGNOSIS — R55 VASOVAGAL SYNCOPE: ICD-10-CM

## 2025-01-28 DIAGNOSIS — R55 SYNCOPE AND COLLAPSE: ICD-10-CM

## 2025-01-28 LAB
ALBUMIN SERPL-MCNC: 3.9 G/DL (ref 3.5–5.2)
ALP SERPL-CCNC: 84 U/L (ref 40–129)
ALT SERPL-CCNC: 18 U/L (ref 0–40)
ANION GAP SERPL CALCULATED.3IONS-SCNC: 14 MMOL/L (ref 7–16)
AST SERPL-CCNC: 26 U/L (ref 0–39)
B PARAP IS1001 DNA NPH QL NAA+NON-PROBE: NOT DETECTED
B PERT DNA SPEC QL NAA+PROBE: NOT DETECTED
BASOPHILS # BLD: 0 K/UL (ref 0–0.2)
BASOPHILS NFR BLD: 0 % (ref 0–2)
BILIRUB SERPL-MCNC: 0.5 MG/DL (ref 0–1.2)
BILIRUB UR QL STRIP: NEGATIVE
BUN SERPL-MCNC: 32 MG/DL (ref 6–23)
C PNEUM DNA NPH QL NAA+NON-PROBE: NOT DETECTED
CALCIUM SERPL-MCNC: 8.9 MG/DL (ref 8.6–10.2)
CHLORIDE SERPL-SCNC: 103 MMOL/L (ref 98–107)
CLARITY UR: CLEAR
CO2 SERPL-SCNC: 20 MMOL/L (ref 22–29)
COLOR UR: YELLOW
COMMENT: NORMAL
CREAT SERPL-MCNC: 1.7 MG/DL (ref 0.7–1.2)
EOSINOPHIL # BLD: 0.6 K/UL (ref 0.05–0.5)
EOSINOPHILS RELATIVE PERCENT: 4 % (ref 0–6)
ERYTHROCYTE [DISTWIDTH] IN BLOOD BY AUTOMATED COUNT: 14.8 % (ref 11.5–15)
FLUAV RNA NPH QL NAA+NON-PROBE: NOT DETECTED
FLUBV RNA NPH QL NAA+NON-PROBE: NOT DETECTED
GFR, ESTIMATED: 39 ML/MIN/1.73M2
GLUCOSE SERPL-MCNC: 124 MG/DL (ref 74–99)
GLUCOSE UR STRIP-MCNC: NEGATIVE MG/DL
HADV DNA NPH QL NAA+NON-PROBE: NOT DETECTED
HCOV 229E RNA NPH QL NAA+NON-PROBE: NOT DETECTED
HCOV HKU1 RNA NPH QL NAA+NON-PROBE: NOT DETECTED
HCOV NL63 RNA NPH QL NAA+NON-PROBE: NOT DETECTED
HCOV OC43 RNA NPH QL NAA+NON-PROBE: NOT DETECTED
HCT VFR BLD AUTO: 40 % (ref 37–54)
HGB BLD-MCNC: 12.8 G/DL (ref 12.5–16.5)
HGB UR QL STRIP.AUTO: NEGATIVE
HMPV RNA NPH QL NAA+NON-PROBE: NOT DETECTED
HPIV1 RNA NPH QL NAA+NON-PROBE: NOT DETECTED
HPIV2 RNA NPH QL NAA+NON-PROBE: NOT DETECTED
HPIV3 RNA NPH QL NAA+NON-PROBE: NOT DETECTED
HPIV4 RNA NPH QL NAA+NON-PROBE: NOT DETECTED
INR PPP: 1.1
KETONES UR STRIP-MCNC: NEGATIVE MG/DL
LEUKOCYTE ESTERASE UR QL STRIP: NEGATIVE
LYMPHOCYTES NFR BLD: 2.4 K/UL (ref 1.5–4)
LYMPHOCYTES RELATIVE PERCENT: 14 % (ref 20–42)
M PNEUMO DNA NPH QL NAA+NON-PROBE: NOT DETECTED
MCH RBC QN AUTO: 29.7 PG (ref 26–35)
MCHC RBC AUTO-ENTMCNC: 32 G/DL (ref 32–34.5)
MCV RBC AUTO: 92.8 FL (ref 80–99.9)
MONOCYTES NFR BLD: 1.5 K/UL (ref 0.1–0.95)
MONOCYTES NFR BLD: 9 % (ref 2–12)
NEUTROPHILS NFR BLD: 74 % (ref 43–80)
NEUTS SEG NFR BLD: 12.6 K/UL (ref 1.8–7.3)
NITRITE UR QL STRIP: NEGATIVE
PH UR STRIP: 5.5 [PH] (ref 5–9)
PLATELET # BLD AUTO: 190 K/UL (ref 130–450)
PMV BLD AUTO: 11 FL (ref 7–12)
POTASSIUM SERPL-SCNC: 4.2 MMOL/L (ref 3.5–5)
PROCALCITONIN SERPL-MCNC: 0.09 NG/ML (ref 0–0.08)
PROT SERPL-MCNC: 7.4 G/DL (ref 6.4–8.3)
PROT UR STRIP-MCNC: NEGATIVE MG/DL
PROTHROMBIN TIME: 11.8 SEC (ref 9.3–12.4)
RBC # BLD AUTO: 4.31 M/UL (ref 3.8–5.8)
RBC # BLD: ABNORMAL 10*6/UL
RSV RNA NPH QL NAA+NON-PROBE: NOT DETECTED
RV+EV RNA NPH QL NAA+NON-PROBE: NOT DETECTED
SARS-COV-2 RNA NPH QL NAA+NON-PROBE: NOT DETECTED
SODIUM SERPL-SCNC: 137 MMOL/L (ref 132–146)
SP GR UR STRIP: 1.02 (ref 1–1.03)
SPECIMEN DESCRIPTION: NORMAL
TROPONIN I SERPL HS-MCNC: 37 NG/L (ref 0–11)
TROPONIN I SERPL HS-MCNC: 37 NG/L (ref 0–11)
UROBILINOGEN UR STRIP-ACNC: 0.2 EU/DL (ref 0–1)
WBC # BLD: ABNORMAL 10*3/UL
WBC OTHER # BLD: 17.1 K/UL (ref 4.5–11.5)

## 2025-01-28 PROCEDURE — 93306 TTE W/DOPPLER COMPLETE: CPT

## 2025-01-28 PROCEDURE — 2580000003 HC RX 258: Performed by: NURSE PRACTITIONER

## 2025-01-28 PROCEDURE — G0378 HOSPITAL OBSERVATION PER HR: HCPCS

## 2025-01-28 PROCEDURE — 2500000003 HC RX 250 WO HCPCS: Performed by: NURSE PRACTITIONER

## 2025-01-28 PROCEDURE — 0202U NFCT DS 22 TRGT SARS-COV-2: CPT

## 2025-01-28 PROCEDURE — 80053 COMPREHEN METABOLIC PANEL: CPT

## 2025-01-28 PROCEDURE — 90714 TD VACC NO PRESV 7 YRS+ IM: CPT | Performed by: EMERGENCY MEDICINE

## 2025-01-28 PROCEDURE — 90471 IMMUNIZATION ADMIN: CPT | Performed by: EMERGENCY MEDICINE

## 2025-01-28 PROCEDURE — 81003 URINALYSIS AUTO W/O SCOPE: CPT

## 2025-01-28 PROCEDURE — 84484 ASSAY OF TROPONIN QUANT: CPT

## 2025-01-28 PROCEDURE — 6370000000 HC RX 637 (ALT 250 FOR IP): Performed by: NURSE PRACTITIONER

## 2025-01-28 PROCEDURE — 99285 EMERGENCY DEPT VISIT HI MDM: CPT

## 2025-01-28 PROCEDURE — 97166 OT EVAL MOD COMPLEX 45 MIN: CPT

## 2025-01-28 PROCEDURE — 6370000000 HC RX 637 (ALT 250 FOR IP): Performed by: EMERGENCY MEDICINE

## 2025-01-28 PROCEDURE — 85610 PROTHROMBIN TIME: CPT

## 2025-01-28 PROCEDURE — 12002 RPR S/N/AX/GEN/TRNK2.6-7.5CM: CPT

## 2025-01-28 PROCEDURE — 84145 PROCALCITONIN (PCT): CPT

## 2025-01-28 PROCEDURE — 72125 CT NECK SPINE W/O DYE: CPT

## 2025-01-28 PROCEDURE — 70450 CT HEAD/BRAIN W/O DYE: CPT

## 2025-01-28 PROCEDURE — 6360000002 HC RX W HCPCS: Performed by: EMERGENCY MEDICINE

## 2025-01-28 PROCEDURE — 85025 COMPLETE CBC W/AUTO DIFF WBC: CPT

## 2025-01-28 PROCEDURE — 93005 ELECTROCARDIOGRAM TRACING: CPT | Performed by: EMERGENCY MEDICINE

## 2025-01-28 PROCEDURE — 36415 COLL VENOUS BLD VENIPUNCTURE: CPT

## 2025-01-28 PROCEDURE — 71045 X-RAY EXAM CHEST 1 VIEW: CPT

## 2025-01-28 RX ORDER — ROSUVASTATIN CALCIUM 5 MG/1
10 TABLET, COATED ORAL NIGHTLY
Status: DISCONTINUED | OUTPATIENT
Start: 2025-01-28 | End: 2025-01-29 | Stop reason: HOSPADM

## 2025-01-28 RX ORDER — ONDANSETRON 2 MG/ML
4 INJECTION INTRAMUSCULAR; INTRAVENOUS EVERY 6 HOURS PRN
Status: DISCONTINUED | OUTPATIENT
Start: 2025-01-28 | End: 2025-01-29 | Stop reason: HOSPADM

## 2025-01-28 RX ORDER — LISINOPRIL 20 MG/1
20 TABLET ORAL DAILY
Status: DISCONTINUED | OUTPATIENT
Start: 2025-01-28 | End: 2025-01-29 | Stop reason: HOSPADM

## 2025-01-28 RX ORDER — MAGNESIUM SULFATE IN WATER 40 MG/ML
2000 INJECTION, SOLUTION INTRAVENOUS PRN
Status: DISCONTINUED | OUTPATIENT
Start: 2025-01-28 | End: 2025-01-28

## 2025-01-28 RX ORDER — POLYETHYLENE GLYCOL 3350 17 G/17G
17 POWDER, FOR SOLUTION ORAL DAILY PRN
Status: DISCONTINUED | OUTPATIENT
Start: 2025-01-28 | End: 2025-01-29 | Stop reason: HOSPADM

## 2025-01-28 RX ORDER — ASPIRIN 81 MG/1
81 TABLET ORAL DAILY
Status: DISCONTINUED | OUTPATIENT
Start: 2025-01-28 | End: 2025-01-29 | Stop reason: HOSPADM

## 2025-01-28 RX ORDER — ACETAMINOPHEN 325 MG/1
650 TABLET ORAL EVERY 6 HOURS PRN
Status: DISCONTINUED | OUTPATIENT
Start: 2025-01-28 | End: 2025-01-29 | Stop reason: HOSPADM

## 2025-01-28 RX ORDER — SODIUM CHLORIDE 9 MG/ML
INJECTION, SOLUTION INTRAVENOUS CONTINUOUS
Status: DISCONTINUED | OUTPATIENT
Start: 2025-01-28 | End: 2025-01-29

## 2025-01-28 RX ORDER — ENOXAPARIN SODIUM 100 MG/ML
30 INJECTION SUBCUTANEOUS DAILY
Status: DISCONTINUED | OUTPATIENT
Start: 2025-01-28 | End: 2025-01-29 | Stop reason: HOSPADM

## 2025-01-28 RX ORDER — AMLODIPINE BESYLATE 2.5 MG/1
2.5 TABLET ORAL DAILY
Status: DISCONTINUED | OUTPATIENT
Start: 2025-01-28 | End: 2025-01-29 | Stop reason: HOSPADM

## 2025-01-28 RX ORDER — FUROSEMIDE 20 MG/1
20 TABLET ORAL DAILY
Status: DISCONTINUED | OUTPATIENT
Start: 2025-01-28 | End: 2025-01-29 | Stop reason: HOSPADM

## 2025-01-28 RX ORDER — SODIUM CHLORIDE 0.9 % (FLUSH) 0.9 %
5-40 SYRINGE (ML) INJECTION EVERY 12 HOURS SCHEDULED
Status: DISCONTINUED | OUTPATIENT
Start: 2025-01-28 | End: 2025-01-29 | Stop reason: HOSPADM

## 2025-01-28 RX ORDER — BACITRACIN ZINC 500 [USP'U]/G
OINTMENT TOPICAL ONCE
Status: COMPLETED | OUTPATIENT
Start: 2025-01-28 | End: 2025-01-28

## 2025-01-28 RX ORDER — ONDANSETRON 4 MG/1
4 TABLET, ORALLY DISINTEGRATING ORAL EVERY 8 HOURS PRN
Status: DISCONTINUED | OUTPATIENT
Start: 2025-01-28 | End: 2025-01-29 | Stop reason: HOSPADM

## 2025-01-28 RX ORDER — POTASSIUM CHLORIDE 7.45 MG/ML
10 INJECTION INTRAVENOUS PRN
Status: DISCONTINUED | OUTPATIENT
Start: 2025-01-28 | End: 2025-01-28

## 2025-01-28 RX ORDER — SODIUM CHLORIDE 9 MG/ML
INJECTION, SOLUTION INTRAVENOUS PRN
Status: DISCONTINUED | OUTPATIENT
Start: 2025-01-28 | End: 2025-01-29 | Stop reason: HOSPADM

## 2025-01-28 RX ORDER — MIRTAZAPINE 15 MG/1
15 TABLET, FILM COATED ORAL NIGHTLY
Status: DISCONTINUED | OUTPATIENT
Start: 2025-01-28 | End: 2025-01-29 | Stop reason: HOSPADM

## 2025-01-28 RX ORDER — FERROUS SULFATE 325(65) MG
325 TABLET ORAL
Status: DISCONTINUED | OUTPATIENT
Start: 2025-01-28 | End: 2025-01-29 | Stop reason: HOSPADM

## 2025-01-28 RX ORDER — PAROXETINE 20 MG/1
20 TABLET, FILM COATED ORAL DAILY
Status: DISCONTINUED | OUTPATIENT
Start: 2025-01-28 | End: 2025-01-29 | Stop reason: HOSPADM

## 2025-01-28 RX ORDER — POTASSIUM CHLORIDE 1500 MG/1
40 TABLET, EXTENDED RELEASE ORAL PRN
Status: DISCONTINUED | OUTPATIENT
Start: 2025-01-28 | End: 2025-01-28

## 2025-01-28 RX ORDER — SODIUM CHLORIDE 0.9 % (FLUSH) 0.9 %
10 SYRINGE (ML) INJECTION PRN
Status: DISCONTINUED | OUTPATIENT
Start: 2025-01-28 | End: 2025-01-29 | Stop reason: HOSPADM

## 2025-01-28 RX ORDER — ACETAMINOPHEN 650 MG/1
650 SUPPOSITORY RECTAL EVERY 6 HOURS PRN
Status: DISCONTINUED | OUTPATIENT
Start: 2025-01-28 | End: 2025-01-29 | Stop reason: HOSPADM

## 2025-01-28 RX ORDER — CALCIUM CARBONATE 500 MG/1
1 TABLET, CHEWABLE ORAL EVERY 8 HOURS PRN
Status: DISCONTINUED | OUTPATIENT
Start: 2025-01-28 | End: 2025-01-29 | Stop reason: HOSPADM

## 2025-01-28 RX ADMIN — SODIUM CHLORIDE: 9 INJECTION, SOLUTION INTRAVENOUS at 11:11

## 2025-01-28 RX ADMIN — FERROUS SULFATE TAB 325 MG (65 MG ELEMENTAL FE) 325 MG: 325 (65 FE) TAB at 20:24

## 2025-01-28 RX ADMIN — MIRTAZAPINE 15 MG: 15 TABLET, FILM COATED ORAL at 20:24

## 2025-01-28 RX ADMIN — SODIUM CHLORIDE, PRESERVATIVE FREE 10 ML: 5 INJECTION INTRAVENOUS at 20:24

## 2025-01-28 RX ADMIN — PAROXETINE HYDROCHLORIDE 20 MG: 20 TABLET, FILM COATED ORAL at 11:12

## 2025-01-28 RX ADMIN — SODIUM CHLORIDE, PRESERVATIVE FREE 10 ML: 5 INJECTION INTRAVENOUS at 11:09

## 2025-01-28 RX ADMIN — BACITRACIN ZINC: 500 OINTMENT TOPICAL at 03:39

## 2025-01-28 RX ADMIN — CLOSTRIDIUM TETANI TOXOID ANTIGEN (FORMALDEHYDE INACTIVATED) AND CORYNEBACTERIUM DIPHTHERIAE TOXOID ANTIGEN (FORMALDEHYDE INACTIVATED) 0.5 ML: 5; 2 INJECTION, SUSPENSION INTRAMUSCULAR at 03:40

## 2025-01-28 RX ADMIN — ROSUVASTATIN CALCIUM 10 MG: 5 TABLET, FILM COATED ORAL at 20:24

## 2025-01-28 ASSESSMENT — LIFESTYLE VARIABLES
HOW OFTEN DO YOU HAVE A DRINK CONTAINING ALCOHOL: PATIENT DECLINED
HOW MANY STANDARD DRINKS CONTAINING ALCOHOL DO YOU HAVE ON A TYPICAL DAY: PATIENT DECLINED

## 2025-01-28 ASSESSMENT — PAIN SCALES - GENERAL
PAINLEVEL_OUTOF10: 0
PAINLEVEL_OUTOF10: 0

## 2025-01-28 NOTE — PROGRESS NOTES
OCCUPATIONAL THERAPY INITIAL EVALUATION    [facility] [address] [place], [place]       Date:2025                                                  Patient Name: Brett Dee  MRN: [id number]  : 1935  Room: [address] [place] [address]    Evaluating OT: Rodolfo Joseph OTR/L [id number]    Referring Provider: Jaimee Hardin APRN - CNP      Specific Provider Orders/Date: OT evaluation and treatment 25 1015    Diagnosis:  Syncope and collapse [R55]  Vasovagal syncope [R55]  Injury of head, initial encounter [S09.90XA]  Laceration of scalp, initial encounter [S01.01XA]      Pertinent Medical History:  has a past medical history of Acute basilic vein thrombosis, right, Acute pancreatitis, Anxiety, Atherosclerotic heart disease of native coronary artery without angina pectoris, BPH (benign prostatic hyperplasia), CAD (coronary artery disease), Compression fracture of T12 vertebra (HCC), COVID-19, Diaphragmatic hernia without obstruction or gangrene, Difficulty walking, Dissection of thoracic aorta (HCC), 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HCC), GERD (gastroesophageal reflux disease), Hyperlipidemia, Irritable bowel syndrome with diarrhea, Major depressive disorder, single episode, unspecified, Muscle wasting and atrophy, not elsewhere classified, multiple sites, Obsessive compulsive disorder, Other lack of coordination, and Psoriasis.   Past Surgical History:   Procedure Laterality Date    CATARACT REMOVAL Bilateral 2017    COLONOSCOPY  2017    HERNIA REPAIR Left 10/25/2022    LAPAROSCOPIC ROBOTIC XI LEFT INGUINAL HERNIA REPAIR WITH MESH performed by Tahir Ortez MD at [facility] OR    HERNIA REPAIR Right 2023    LAPAROSCOPIC ROBOTIC RIGHT INGUINAL HERNIA REPAIR WITH MESH performed by Tahir Ortez MD at [facility] OR    SPINE SURGERY N/A 10/13/2023    T11 KYPHOPLASTY performed by Tyra Alvarado MD at [facility] [place]    THORACIC AORTIC ANEURYSM

## 2025-01-28 NOTE — PROGRESS NOTES
4 Eyes Skin Assessment     NAME:  Brett Dee  YOB: 1935  MEDICAL RECORD NUMBER:  [id number]    The patient is being assessed for  Admission    I agree that at least one RN has performed a thorough Head to Toe Skin Assessment on the patient. ALL assessment sites listed below have been assessed.      Areas assessed by both nurses:    Head, Face, Ears, Shoulders, Back, Chest, Arms, Elbows, Hands, Sacrum. Buttock, Coccyx, Ischium, and Legs. Feet and Heels        Does the Patient have a Wound? Yes wound(s) were present on assessment. LDA wound assessment was Initiated and completed by RN       Rex Prevention initiated by RN: Yes  Wound Care Orders initiated by RN: Yes    Pressure Injury (Stage 3,4, Unstageable, DTI, NWPT, and Complex wounds) if present, place Wound referral order by RN under : No      ---Head lac with sutures in place    New Ostomies, if present place, Ostomy referral order under : No     Nurse 1 eSignature: Electronically signed by KYA KIRK RN on 1/28/25 at 6:00 PM EST    **SHARE this note so that the co-signing nurse can place an eSignature**    Nurse 2 eSignature: Electronically signed by Hannah Valera RN on 1/28/25 at 7:31 PM EST

## 2025-01-28 NOTE — ED NOTES
Joey Gomes NP with suturing laceration to back of head pt alert oriented skin warm dry resp easy pt tolerating procedure

## 2025-01-28 NOTE — ED PROVIDER NOTES
PROCEDURE NOTE    I was asked by the attending physician, Csae Bourgeois MD, to evaluate this pt for laceration repair    Procedures   PROCEDURE NOTE  1/28/25       Time: 0300    LACERATION REPAIR  Risks, benefits and alternatives (for applicable procedures below) described.  Questions were sought and answered, and consent was obtained verbally.  Performed By: ORVILLE Mitchell CNP.  Informed consent: Verbal consent obtained.  The patient was counseled regarding the procedure in person, it's indications, risks, potential complications and alternatives and any questions were answered. Verbal consent was obtained.    Laceration #: 1.  Location: scalp  Length: 3 cm, irregularly shaped, macerated tissue with surrounding hematoma  The wound area was irrigated with sterile saline, cleansed with hydrogen-peroxide and draped in a sterile fashion.  Local Anesthesia:  obtained with Lidocaine 1% with epinephrine.  Digital block required: (yes)/(no)    Visual debris noted and removed.  Wound was thoroughly explored to its base  Extensive cleaning due to nature of wound completed    The wound was explored with the following results:    Thickness: partial thickness. No foreign bodies found, no foreign body or tendon injury seen.  Debridement: None.  Undermining: None.  Wound Margins Revised: no.  Flaps Aligned: yes.  Number of layers and closure: single  The wound was closed in single layer closure with #6  3-0 Prolene using interrupted suture(s).  Dressing:  compression dressing.    There were no additional wounds requiring formal closure.         Mireya Rodrigues APRN - CNP  01/28/25 0322  Laceration was done under my supervision by nurse practitioner.  Please see my separate note for further details of history and physical.     Case Bourgeois MD  01/28/25 0611

## 2025-01-28 NOTE — FLOWSHEET NOTE
01/28/25 1215   Vital Signs   Blood Pressure Lying 129/78   Pulse Lying 83 PER MINUTE   Blood Pressure Sitting 130/80   Pulse Sitting 78 PER MINUTE   Blood Pressure Standing 128/74   Pulse Standing 80 PER MINUTE

## 2025-01-28 NOTE — ED PROVIDER NOTES
HPI:  1/28/25, Time: 2:46 AM VIET Dee is a 89 y.o. male history of basilic vein thrombosis history of pancreatitis history anxiety history of coronary disease history of dissection of thoracic aorta dissection of the thoracoabdominal aorta history of acid reflux hyperlipidemia history of muscle wasting obsessive-compulsive disorder presenting to the ED for fall head injury, beginning hours ago.  The complaint has been persistent, moderate in severity, and worsened by nothing.  Patient reports he was going up the steps and believes as he was going up steps he lost his balance and passed out.  Patient fell back he did strike his head on concrete floor landing.  Patient reporting no LOC.  Patient reporting no abdominal pain no chest pain no difficulty breathing there is no vomiting or diarrhea reports no numbness or tingling.  Patient did drive himself from home to here.  Patient reporting no flank pain    ROS:   Pertinent positives and negatives are stated within HPI, all other systems reviewed and are negative.  --------------------------------------------- PAST HISTORY ---------------------------------------------  Past Medical History:  has a past medical history of Acute basilic vein thrombosis, right, Acute pancreatitis, Anxiety, Atherosclerotic heart disease of native coronary artery without angina pectoris, BPH (benign prostatic hyperplasia), CAD (coronary artery disease), Compression fracture of T12 vertebra (HCC), COVID-19, Diaphragmatic hernia without obstruction or gangrene, Difficulty walking, Dissection of thoracic aorta (HCC), 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HCC), GERD (gastroesophageal reflux disease), Hyperlipidemia, Irritable bowel syndrome with diarrhea, Major depressive disorder, single episode, unspecified, Muscle wasting and atrophy, not elsewhere classified, multiple sites, Obsessive compulsive disorder, Other lack of coordination, and Psoriasis.    Past Surgical History:

## 2025-01-28 NOTE — PROGRESS NOTES
DVT Prophylaxis Adjustment Policy (DVT Prophylaxis)     This patient is on DVT Prophylaxis medication that requires a dose adjustment      Date Body Weight IBW  Adjusted BW SCr  CrCl Dialysis status   1/28/2025 65.7 kg (144 lb 14.4 oz) Ideal body weight: 73 kg (160 lb 15 oz) Serum creatinine: 1.7 mg/dL (H) 01/28/25 0258  Estimated creatinine clearance: 27 mL/min (A) N/a       Pharmacy has dose-adjusted the DVT Prophylaxis regimen to match   the recommendations from the following table        Ordered Medication:Lovenox 40mg daily    Order Changed/converted to: Lovenox 30mg daily      These changes were made per protocol according to the [facility] Pharmacist   Review for Appropriate Use and Automatic Dose Adjustments of   Subcutaneous Anticoagulants Policy     *Please note this dose may need readjusted if patient's condition changes.    Please contact pharmacy with any questions regarding these changes.    Francisco Angel RPH  1/28/2025  1:15 PM

## 2025-01-28 NOTE — H&P
[facility]  History and Physical      CHIEF COMPLAINT:    Chief Complaint   Patient presents with    Fall     Pt fell down 3-4 steps hitting head on concrete. Bleeding to back of head. +thinners.         Patient of Jesus Kenney DO presents with:  Syncope and collapse    History of Present Illness:   Patient is a 89-year-old male with a past medical history of acute basilic vein thrombosis, acute pancreatitis, anxiety, atherosclerotic heart disease, BPH, CAF, compression fracture of T12 vertebra, COVID-19, diaphragmatic hernia, dissection of thoracic aorta, GERD, hyperlipidemia, IBS, major depressive disorder, muscle wasting, OCD, and psoriasis. Patient presented to the ED for fall head injury. Patient reports hitting head on concrete floor landing during fall as he  lost balance while going up steps. Denies altered LOC. Head laceration repaired in ED. ED work-up revealed EKG sinus rhythm with 1st degree AV block, possible left atrial enlargement, left axis deviation, inferior & anterior infarct. CXR: borderline cardiomegaly with mild increased markings in right lung. CT Head: no acute intracranial abnormality, generalized atrophy with chronic small vessel ischemic change, scalp hematoma along the right posterior vertex. WBC 17.1, BUN/Cr 32/1.7, GFR 39, troponin 37. Patient admitted to telemetry unit for further evaluation and treatment.   On evaluation patient is resting comfortably in no acute distress.  He tells me he did not pass out.  He apparently was \"carrying a plate of grapes and was trying to balance himself and the plate at the same time and subsequently had a mechanical fall.  He does live at home by himself and is interested in placement.  Although white count is elevated, he denies any fevers chills or acute illness as of late.    REVIEW OF SYSTEMS:  Pertinent negatives are above in HPI.  10 point ROS otherwise negative.      Past Medical History:   Diagnosis Date    Acute basilic

## 2025-01-29 VITALS
OXYGEN SATURATION: 100 % | WEIGHT: 151.5 LBS | HEIGHT: 70 IN | SYSTOLIC BLOOD PRESSURE: 177 MMHG | TEMPERATURE: 99 F | DIASTOLIC BLOOD PRESSURE: 93 MMHG | BODY MASS INDEX: 21.69 KG/M2 | RESPIRATION RATE: 16 BRPM | HEART RATE: 98 BPM

## 2025-01-29 LAB
ANION GAP SERPL CALCULATED.3IONS-SCNC: 6 MMOL/L (ref 7–16)
BASOPHILS # BLD: 0.06 K/UL (ref 0–0.2)
BASOPHILS NFR BLD: 1 % (ref 0–2)
BUN SERPL-MCNC: 24 MG/DL (ref 6–23)
CALCIUM SERPL-MCNC: 7.9 MG/DL (ref 8.6–10.2)
CHLORIDE SERPL-SCNC: 111 MMOL/L (ref 98–107)
CO2 SERPL-SCNC: 24 MMOL/L (ref 22–29)
CREAT SERPL-MCNC: 1.3 MG/DL (ref 0.7–1.2)
ECHO AO ASC DIAM: 2.8 CM
ECHO AO ASCENDING AORTA INDEX: 1.54 CM/M2
ECHO AV AREA PEAK VELOCITY: 2.2 CM2
ECHO AV AREA VTI: 2.2 CM2
ECHO AV AREA/BSA PEAK VELOCITY: 1.2 CM2/M2
ECHO AV AREA/BSA VTI: 1.2 CM2/M2
ECHO AV CUSP MM: 1.8 CM
ECHO AV MEAN GRADIENT: 4 MMHG
ECHO AV MEAN VELOCITY: 0.9 M/S
ECHO AV PEAK GRADIENT: 7 MMHG
ECHO AV PEAK VELOCITY: 1.3 M/S
ECHO AV VELOCITY RATIO: 0.69
ECHO AV VTI: 26.6 CM
ECHO BSA: 1.8 M2
ECHO LA DIAMETER INDEX: 2.31 CM/M2
ECHO LA DIAMETER: 4.2 CM
ECHO LA VOL A-L A2C: 46 ML (ref 18–58)
ECHO LA VOL A-L A4C: 83 ML (ref 18–58)
ECHO LA VOL MOD A2C: 45 ML (ref 18–58)
ECHO LA VOL MOD A4C: 79 ML (ref 18–58)
ECHO LA VOLUME AREA LENGTH: 62 ML
ECHO LA VOLUME INDEX A-L A2C: 25 ML/M2 (ref 16–34)
ECHO LA VOLUME INDEX A-L A4C: 46 ML/M2 (ref 16–34)
ECHO LA VOLUME INDEX AREA LENGTH: 34 ML/M2 (ref 16–34)
ECHO LA VOLUME INDEX MOD A2C: 25 ML/M2 (ref 16–34)
ECHO LA VOLUME INDEX MOD A4C: 43 ML/M2 (ref 16–34)
ECHO LV EF PHYSICIAN: 55 %
ECHO LV FRACTIONAL SHORTENING: 33 % (ref 28–44)
ECHO LV INTERNAL DIMENSION DIASTOLE INDEX: 2.14 CM/M2
ECHO LV INTERNAL DIMENSION DIASTOLIC: 3.9 CM (ref 4.2–5.9)
ECHO LV INTERNAL DIMENSION SYSTOLIC INDEX: 1.43 CM/M2
ECHO LV INTERNAL DIMENSION SYSTOLIC: 2.6 CM
ECHO LV IVSD: 1.2 CM (ref 0.6–1)
ECHO LV IVSS: 1.3 CM
ECHO LV MASS 2D: 149.5 G (ref 88–224)
ECHO LV MASS INDEX 2D: 82.2 G/M2 (ref 49–115)
ECHO LV POSTERIOR WALL DIASTOLIC: 1.1 CM (ref 0.6–1)
ECHO LV POSTERIOR WALL SYSTOLIC: 1.2 CM
ECHO LV RELATIVE WALL THICKNESS RATIO: 0.56
ECHO LVOT AREA: 3.1 CM2
ECHO LVOT AV VTI INDEX: 0.7
ECHO LVOT DIAM: 2 CM
ECHO LVOT MEAN GRADIENT: 2 MMHG
ECHO LVOT PEAK GRADIENT: 3 MMHG
ECHO LVOT PEAK VELOCITY: 0.9 M/S
ECHO LVOT STROKE VOLUME INDEX: 31.9 ML/M2
ECHO LVOT SV: 58.1 ML
ECHO LVOT VTI: 18.5 CM
ECHO MV "A" WAVE DURATION: 117.7 MSEC
ECHO MV A VELOCITY: 1.03 M/S
ECHO MV AREA PHT: 2.9 CM2
ECHO MV AREA VTI: 1.9 CM2
ECHO MV E DECELERATION TIME (DT): 271.5 MS
ECHO MV E VELOCITY: 0.72 M/S
ECHO MV E/A RATIO: 0.7
ECHO MV LVOT VTI INDEX: 1.67
ECHO MV MAX VELOCITY: 1.1 M/S
ECHO MV MEAN GRADIENT: 3 MMHG
ECHO MV MEAN VELOCITY: 0.8 M/S
ECHO MV PEAK GRADIENT: 5 MMHG
ECHO MV PRESSURE HALF TIME (PHT): 76.3 MS
ECHO MV VTI: 30.9 CM
ECHO PV MAX VELOCITY: 0.9 M/S
ECHO PV MEAN GRADIENT: 2 MMHG
ECHO PV MEAN VELOCITY: 0.7 M/S
ECHO PV PEAK GRADIENT: 3 MMHG
ECHO PV VTI: 16.4 CM
ECHO PVEIN A DURATION: 103.8 MS
ECHO PVEIN A VELOCITY: 0.4 M/S
ECHO PVEIN PEAK D VELOCITY: 0.3 M/S
ECHO PVEIN PEAK S VELOCITY: 0.5 M/S
ECHO PVEIN S/D RATIO: 1.7
ECHO RV INTERNAL DIMENSION: 3 CM
EOSINOPHIL # BLD: 0.64 K/UL (ref 0.05–0.5)
EOSINOPHILS RELATIVE PERCENT: 7 % (ref 0–6)
ERYTHROCYTE [DISTWIDTH] IN BLOOD BY AUTOMATED COUNT: 15.1 % (ref 11.5–15)
GFR, ESTIMATED: 53 ML/MIN/1.73M2
GLUCOSE SERPL-MCNC: 96 MG/DL (ref 74–99)
HCT VFR BLD AUTO: 28 % (ref 37–54)
HGB BLD-MCNC: 9 G/DL (ref 12.5–16.5)
IMM GRANULOCYTES # BLD AUTO: 0.04 K/UL (ref 0–0.58)
IMM GRANULOCYTES NFR BLD: 0 % (ref 0–5)
LYMPHOCYTES NFR BLD: 2.33 K/UL (ref 1.5–4)
LYMPHOCYTES RELATIVE PERCENT: 26 % (ref 20–42)
MCH RBC QN AUTO: 29.6 PG (ref 26–35)
MCHC RBC AUTO-ENTMCNC: 32.1 G/DL (ref 32–34.5)
MCV RBC AUTO: 92.1 FL (ref 80–99.9)
MONOCYTES NFR BLD: 1.26 K/UL (ref 0.1–0.95)
MONOCYTES NFR BLD: 14 % (ref 2–12)
NEUTROPHILS NFR BLD: 52 % (ref 43–80)
NEUTS SEG NFR BLD: 4.61 K/UL (ref 1.8–7.3)
PLATELET # BLD AUTO: 146 K/UL (ref 130–450)
PMV BLD AUTO: 11.3 FL (ref 7–12)
POTASSIUM SERPL-SCNC: 4.7 MMOL/L (ref 3.5–5)
RBC # BLD AUTO: 3.04 M/UL (ref 3.8–5.8)
SODIUM SERPL-SCNC: 141 MMOL/L (ref 132–146)
WBC OTHER # BLD: 8.9 K/UL (ref 4.5–11.5)

## 2025-01-29 PROCEDURE — 6370000000 HC RX 637 (ALT 250 FOR IP): Performed by: NURSE PRACTITIONER

## 2025-01-29 PROCEDURE — 36415 COLL VENOUS BLD VENIPUNCTURE: CPT

## 2025-01-29 PROCEDURE — 96372 THER/PROPH/DIAG INJ SC/IM: CPT

## 2025-01-29 PROCEDURE — 6360000002 HC RX W HCPCS: Performed by: NURSE PRACTITIONER

## 2025-01-29 PROCEDURE — 93306 TTE W/DOPPLER COMPLETE: CPT | Performed by: INTERNAL MEDICINE

## 2025-01-29 PROCEDURE — 80048 BASIC METABOLIC PNL TOTAL CA: CPT

## 2025-01-29 PROCEDURE — 85025 COMPLETE CBC W/AUTO DIFF WBC: CPT

## 2025-01-29 PROCEDURE — G0378 HOSPITAL OBSERVATION PER HR: HCPCS

## 2025-01-29 PROCEDURE — 2500000003 HC RX 250 WO HCPCS: Performed by: NURSE PRACTITIONER

## 2025-01-29 RX ADMIN — PAROXETINE HYDROCHLORIDE 20 MG: 20 TABLET, FILM COATED ORAL at 09:57

## 2025-01-29 RX ADMIN — AMLODIPINE BESYLATE 2.5 MG: 2.5 TABLET ORAL at 09:56

## 2025-01-29 RX ADMIN — SODIUM CHLORIDE, PRESERVATIVE FREE 10 ML: 5 INJECTION INTRAVENOUS at 09:56

## 2025-01-29 RX ADMIN — ASPIRIN 81 MG: 81 TABLET, COATED ORAL at 09:57

## 2025-01-29 RX ADMIN — ENOXAPARIN SODIUM 30 MG: 100 INJECTION SUBCUTANEOUS at 09:56

## 2025-01-29 NOTE — CARE COORDINATION
1/29/25, Patient admitted for Syncope and Collapse.  SW met with patient in room to discuss transition of care/SW role.  Patient lives alone in a tri story home.  There is 1 step intoe the home and 12 steps to the 3rd floor where bed and bath are located.  Patient has a half bath downstairs.  DME owned is a WW (3) which patient has placed on every floor.  Pharmacy is either [facility] Order with [facility] or [facility] in [place].  PCP is Dr. Jesus Kenney.  Patient has been to [facility] for AL and to [facility] in past.  Patient says he has a plane to catch Friday Morning for [place].  His plan is home with nephew Gutierrez to be transport home.  Gutierrez can be reached at [phone].  Patient says he drove self here so family will assist in getting vehicle home.  Patient is alert x 4.  OT was 18/24.  PT was called to see ASAP since patient is wanting to get home today to pack for trip.  SW to follow.      Case Management Assessment  Initial Evaluation    Date/Time of Evaluation: 1/29/2025 2:43 PM  Assessment Completed by: MARYSOL Su    If patient is discharged prior to next notation, then this note serves as note for discharge by case management.    Patient Name: Brett Dee                   YOB: 1935  Diagnosis: Syncope and collapse [R55]  Vasovagal syncope [R55]  Injury of head, initial encounter [S09.90XA]  Laceration of scalp, initial encounter [S01.01XA]                   Date / Time: 1/28/2025  2:44 AM    Patient Admission Status: Observation   Readmission Risk (Low < 19, Mod (19-27), High > 27): No data recorded  Current PCP: Jesus Kenney, DO  PCP verified by ? Yes    Chart Reviewed: Yes      History Provided by: Patient  Patient Orientation: Alert and Oriented    Patient Cognition: Alert    Hospitalization in the last 30 days (Readmission):  No    If yes, Readmission Assessment in  Navigator will be completed.    Advance Directives:      Code Status: Full Code   Patient's Primary

## 2025-01-29 NOTE — ACP (ADVANCE CARE PLANNING)
1/29/25, Advance Care Planning   Healthcare Decision Maker:    Primary Decision Maker: octavio ness - Niece/Nephew - [phone]    Secondary Decision Maker: Christiano Ness Niece/Nephew - [phone]    Click here to complete Healthcare Decision Makers including selection of the Healthcare Decision Maker Relationship (ie \"Primary\").

## 2025-01-29 NOTE — PLAN OF CARE
Problem: Safety - Adult  Goal: Free from fall injury  1/28/2025 2143 by Marilu Jacobs RN  Outcome: Progressing  1/28/2025 1233 by Raya De Leon RN  Outcome: Progressing     Problem: Discharge Planning  Goal: Discharge to home or other facility with appropriate resources  1/28/2025 2143 by Marilu Jacobs RN  Outcome: Progressing  1/28/2025 1233 by Raya De Leon RN  Outcome: Progressing     Problem: Chronic Conditions and Co-morbidities  Goal: Patient's chronic conditions and co-morbidity symptoms are monitored and maintained or improved  1/28/2025 2143 by Marilu Jacobs RN  Outcome: Progressing  1/28/2025 1233 by Raya De Leon RN  Outcome: Progressing     Problem: Musculoskeletal - Adult  Goal: Return mobility to safest level of function  1/28/2025 2143 by Marilu Jacobs RN  Outcome: Progressing  1/28/2025 1233 by Raya De Leon RN  Outcome: Progressing  Goal: Maintain proper alignment of affected body part  1/28/2025 2143 by Marilu Jacobs RN  Outcome: Progressing  1/28/2025 1233 by Raya De Leon RN  Outcome: Progressing  Goal: Return ADL status to a safe level of function  1/28/2025 2143 by Marilu Jacobs RN  Outcome: Progressing  1/28/2025 1233 by Raya De Leon RN  Outcome: Progressing     Problem: Hematologic - Adult  Goal: Maintains hematologic stability  1/28/2025 2143 by Marilu Jacobs RN  Outcome: Progressing  1/28/2025 1233 by Raya De Leon RN  Outcome: Progressing     Problem: Pain  Goal: Verbalizes/displays adequate comfort level or baseline comfort level  1/28/2025 2143 by Marilu Jacobs RN  Outcome: Progressing  Flowsheets (Taken 1/28/2025 1529 by Raya De Leon RN)  Verbalizes/displays adequate comfort level or baseline comfort level:   Encourage patient to monitor pain and request assistance   Assess pain using appropriate pain scale   Administer analgesics based on type and severity of pain and evaluate response  1/28/2025 1233 by Raya De Leon RN  Outcome: Progressing

## 2025-01-29 NOTE — PROGRESS NOTES
Perfect serve sent to Jaimee Hardin CNP, regarding echo results and is patient would be ok for discharge

## 2025-01-29 NOTE — PROGRESS NOTES
Discharge instructions prepared. All questions answered at this time, and understanding was verbalized. Patient placed in transport for discharge.

## 2025-01-30 ENCOUNTER — CARE COORDINATION (OUTPATIENT)
Dept: CARE COORDINATION | Age: 89
End: 2025-01-30

## 2025-01-30 DIAGNOSIS — R55 SYNCOPE AND COLLAPSE: Primary | ICD-10-CM

## 2025-01-30 NOTE — CARE COORDINATION
Care Transitions Note    Initial Call - Call within 2 business days of discharge: Yes    Patient Current Location:  Home: [address] [place] [address] [place] [address]    Care Transition Nurse contacted the patient by telephone to perform post hospital discharge assessment, verified name and  as identifiers. Provided introduction to self, and explanation of the Care Transition Nurse role.     Patient: Brett Dee    Patient : 1935   MRN: [id number]    Reason for Admission: fall, laceration to head  Discharge Date: 25  RURS: No data recorded    Last Discharge Facility       Date Complaint Diagnosis Description Type Department Provider    25 Fall Injury of head, initial encounter ... ED to Hosp-Admission (Discharged) (ADMITTED) [facility] Annalise Lopez MD; Case Bourgeois ...            Was this an external facility discharge? No    Additional needs identified to be addressed with provider   Standard priority: [facility]PC-pt leaving for [place] for 2 months             Method of communication with provider: chart routing.    Patients top risk factors for readmission: medical condition-fall, CAD, Htn    Interventions to address risk factors:   Education: fall precautions, blood thinner precautions  Review of patient management of conditions/medications: Reviewed medications, appt needs    Care Summary Note: Spoke to Brett for transitions initial call. Patient fell backward at home and injured his head, required sutures to laceration, has hematoma. Patient slept on recliner last night rather than go upstairs to bed. Nephew is with him this morning. Patient has flight to [place] tomorrow, may change to , will be there 2 months. Pt has family there that will check in on him.     Denies HA, pain, vision salcido, confusion. Dizziness, light headedness. Head is a little sore. PCP is out of office the rest of the week. Messaged PCP regarding fall and leaving town. Encouraged pt to call writer back at number

## 2025-01-31 LAB
EKG ATRIAL RATE: 87 BPM
EKG P AXIS: 28 DEGREES
EKG P-R INTERVAL: 244 MS
EKG Q-T INTERVAL: 368 MS
EKG QRS DURATION: 86 MS
EKG QTC CALCULATION (BAZETT): 442 MS
EKG R AXIS: -34 DEGREES
EKG T AXIS: 41 DEGREES
EKG VENTRICULAR RATE: 87 BPM

## 2025-01-31 PROCEDURE — 93010 ELECTROCARDIOGRAM REPORT: CPT | Performed by: INTERNAL MEDICINE

## 2025-02-11 ENCOUNTER — TELEPHONE (OUTPATIENT)
Dept: ADMINISTRATIVE | Age: 89
End: 2025-02-11

## 2025-02-11 NOTE — TELEPHONE ENCOUNTER
[facility] requesting clarification on patient medication. Please reach Cherri or Susana  at [phone]

## 2025-04-22 RX ORDER — FUROSEMIDE 20 MG/1
20 TABLET ORAL DAILY
Qty: 90 TABLET | Refills: 1 | Status: SHIPPED | OUTPATIENT
Start: 2025-04-22

## 2025-09-04 ENCOUNTER — TELEPHONE (OUTPATIENT)
Dept: VASCULAR SURGERY | Age: 89
End: 2025-09-04

## (undated) DEVICE — GLOVE ORANGE PI 7 1/2   MSG9075

## (undated) DEVICE — BONE CEMENT C01B HV-R WITH MIXER  US: Brand: KYPHON® HV-R® BONE CEMENT AND KYPHON® MIXER PACK

## (undated) DEVICE — FORCE BIPOLAR: Brand: ENDOWRIST

## (undated) DEVICE — 3M™ STERI-DRAPE™ CARDIOVASCULAR SHEET WITH IOBAN™ 2 INCISE FILM 6677: Brand: STERI-DRAPE™ IOBAN™ 2

## (undated) DEVICE — GLOVE SURG SZ 7.5 L11.73IN FNGR THK9.8MIL STRW LTX POLYMER

## (undated) DEVICE — AGENT HEMSTAT 3GM PURIFIED PLNT STARCH PWD ABSRB ARISTA AH

## (undated) DEVICE — BEACON TIP TORCON NB ADVANTAGE CATHETER: Brand: BEACON TIP TORCON NB

## (undated) DEVICE — CHECK-FLO PERFORMER INTRODUCER: Brand: PERFORMER

## (undated) DEVICE — MEGA SUTURECUT ND: Brand: ENDOWRIST

## (undated) DEVICE — GUIDEWIRE VASC L180CM DIA0.035IN L15CM STR TIP PTFE S STL

## (undated) DEVICE — GOWN,SIRUS,FABRNF,XL,20/CS: Brand: MEDLINE

## (undated) DEVICE — PERCLOSE PROGLIDE™ SUTURE-MEDIATED CLOSURE SYSTEM: Brand: PERCLOSE PROGLIDE™

## (undated) DEVICE — PLUMEPORT ACTIV LAPAROSCOPIC SMOKE FILTRATION DEVICE: Brand: PLUMEPORT ACTIVE

## (undated) DEVICE — GOWN,SIRUS,FABRNF,L,20/CS: Brand: MEDLINE

## (undated) DEVICE — ARM DRAPE

## (undated) DEVICE — MICROPUNCTURE INTRODUCER SET SILHOUETTE TRANSITIONLESS PUSH-PLUS DESIGN - STIFFENED CANNULA WITH STAINLESS STEEL WIRE GUIDE: Brand: MICROPUNCTURE

## (undated) DEVICE — MEDIA CONTRAST SYRINGE 125ML PREFLL OPTIRAY 320 PWR INJ

## (undated) DEVICE — INSUFFLATION NEEDLE TO ESTABLISH PNEUMOPERITONEUM.: Brand: INSUFFLATION NEEDLE

## (undated) DEVICE — PACK SURG LAP CHOLE CUSTOM

## (undated) DEVICE — JACKSON TABLE POSITIONER KIT: Brand: MEDLINE INDUSTRIES, INC.

## (undated) DEVICE — FIXED CORE WIRE GUIDE SAFE-T-J, CURVED: Brand: COOK

## (undated) DEVICE — CLEANER LENS C-CLEAR

## (undated) DEVICE — COVER,LIGHT HANDLE,FLX,1/PK: Brand: MEDLINE INDUSTRIES, INC.

## (undated) DEVICE — GLOVE SURG SIGNATURE LTX ESS LTX PF 7.5

## (undated) DEVICE — SYSTEM CATH 20GA L1IN OD1.0668-1.143MM ID0.7874-0.8636MM

## (undated) DEVICE — [HIGH FLOW INSUFFLATOR,  DO NOT USE IF PACKAGE IS DAMAGED,  KEEP DRY,  KEEP AWAY FROM SUNLIGHT,  PROTECT FROM HEAT AND RADIOACTIVE SOURCES.]: Brand: PNEUMOSURE

## (undated) DEVICE — KIT MED IMAG CNTRST AGNT W/ IOPAMIDOL REUSE

## (undated) DEVICE — Z INACTIVE USE 2855094 SPONGE SURG W3 8IN WHT COT RND PNUT DISECT W COUNT CRD RADPQ

## (undated) DEVICE — COLUMN DRAPE

## (undated) DEVICE — SYRINGE A08E KIS INFLATION HP: Brand: KYPHON®  INFLATION SYRINGE

## (undated) DEVICE — MARKER,SKIN,WI/RULER AND LABELS: Brand: MEDLINE

## (undated) DEVICE — KIT HND CTRL 3 W STPCOCK ROT END 54IN PREM HI PRSS TBNG AT

## (undated) DEVICE — SHEET DRAPE FULL 70X100

## (undated) DEVICE — 18GA (1.3MM OD: 1.0MM ID) X 7CM INTRODUCER18GA X 7CM NEEDLENEEDLE: Brand: INTRODUCER NEEDLEINTRODUCER NEEDLE

## (undated) DEVICE — TOTAL TRAY, 16FR 10ML SIL FOLEY, URN: Brand: MEDLINE

## (undated) DEVICE — SYRINGE MED 10ML TRNSLUC BRL PLUNG BLK MRK POLYPR CTRL

## (undated) DEVICE — ELECTRODE PT RET AD L9FT HI MOIST COND ADH HYDRGEL CORDED

## (undated) DEVICE — NEEDLE HYPO 25GA L1.5IN BLU POLYPR HUB S STL REG BVL STR

## (undated) DEVICE — SUPPORT SCROT L AD L39-44IN SFT KNIT PCH SUSP WAISTBAND

## (undated) DEVICE — Device

## (undated) DEVICE — GOWN,SIRUS,NONRNF,SETINSLV,XL,20/CS: Brand: MEDLINE

## (undated) DEVICE — SHEATH INTRO 8FR L12CM GWIRE 0.038IN W/ SMOOTH TAPERS TIP

## (undated) DEVICE — CATHETER INTVENT AD 5FR L100CM DIAG PGTL W/ SIDE H PERIPH

## (undated) DEVICE — LIQUIBAND RAPID ADHESIVE 36/CS 0.8ML: Brand: MEDLINE

## (undated) DEVICE — AGENT HEMSTAT 3GM OXIDIZED REGENERATED CELOS ABSRB FOR CONT (ORDER MULTIPLES OF 5EA)

## (undated) DEVICE — BONE BIOPSY DEVICE F05A BBD SIZE 3: Brand: MEDTRONIC REUSABLE INSTRUMENTS

## (undated) DEVICE — DOUBLE BASIN SET: Brand: MEDLINE INDUSTRIES, INC.

## (undated) DEVICE — BONE FILLER DEVICE F04B SIZE 3

## (undated) DEVICE — GLOVE ORANGE PI 8   MSG9080

## (undated) DEVICE — BASIC DOUBLE BASIN 2-LF: Brand: MEDLINE INDUSTRIES, INC.

## (undated) DEVICE — APPLICATOR SURG XL L38CM FOR ARISTA ABSRB HEMSTAT FLEXITIP

## (undated) DEVICE — TUBING ANGIO L48IN POLYUR HI PRSS 1200PSI AIRLESS ROT ADPT

## (undated) DEVICE — ADHESIVE SKIN CLOSURE 0.7CC TOP MICROBIAL APPL DERMBND ADV

## (undated) DEVICE — SUTURE ABSRB L6IN L37MM 0 GS-21 GRN 1/2 CIR TAPR PNT NDL VLOCL0306

## (undated) DEVICE — SODIUM CHL 09% SOL EXCEL

## (undated) DEVICE — SURGICEL ENDOSCP APPL

## (undated) DEVICE — INTRODUCER SHTH 6FR L12CM DIA0.038IN HEMSTAS CLOSE TOL

## (undated) DEVICE — GARMENT,MEDLINE,DVT,INT,CALF,MED, GEN2: Brand: MEDLINE

## (undated) DEVICE — LOOP VES W25MM THK1MM MAXI RED SIL FLD REPELLENT 100 PER

## (undated) DEVICE — MEDIA CONTRAST ISOVUE 300 100ML

## (undated) DEVICE — GLOVE SURG 8.5 PF POLYMER WHT STRL SIGN LTX ESSENTIAL LTX

## (undated) DEVICE — APPLICATOR MEDICATED 26 CC SOLUTION HI LT ORNG CHLORAPREP

## (undated) DEVICE — RADIFOCUS GLIDEWIRE ADVANTAGE GUIDEWIRE: Brand: GLIDEWIRE ADVANTAGE

## (undated) DEVICE — Z INACTIVE USE 2863041 SPONGE GZ W4XL4IN 100% COT 16 PLY RADPQ HIGHLY ABSRB

## (undated) DEVICE — AGENT HEMSTAT W2XL4IN OXIDIZED REGENERATED CELOS ABSRB

## (undated) DEVICE — BLADELESS OBTURATOR: Brand: WECK VISTA

## (undated) DEVICE — SYRINGE 20ML LL S/C 50

## (undated) DEVICE — GAUZE,SPONGE,4"X4",16PLY,XRAY,STRL,LF: Brand: MEDLINE

## (undated) DEVICE — ELECTRO LUBE IS A SINGLE PATIENT USE DEVICE THAT IS INTENDED TO BE USED ON ELECTROSURGICAL ELECTRODES TO REDUCE STICKING.: Brand: KEY SURGICAL ELECTRO LUBE

## (undated) DEVICE — CANNULA SEAL

## (undated) DEVICE — INTRODUCER T15K ONE STEP OID BEVEL: Brand: KYPHON® ONE-STEP™ OSTEO INTRODUCER™ SYSTEM

## (undated) DEVICE — KIT MFLD ISOLATN NACL CNTRST PRT TBNG SPIK W/ PRSS TRNSDUC

## (undated) DEVICE — 3M™ IOBAN™ 2 ANTIMICROBIAL INCISE DRAPE 6650EZ: Brand: IOBAN™ 2

## (undated) DEVICE — DRAPE EQUIP BANDED BG 36X28 IN W/ROUNDED CORNER SNAPKOVER

## (undated) DEVICE — SHEATH INTRO 22FR L33CM OD8.2MM ID7.3MM HYDRPHLC KINK

## (undated) DEVICE — WARMER SCP LAP

## (undated) DEVICE — TOWEL,OR,DSP,ST,BLUE,STD,6/PK,12PK/CS: Brand: MEDLINE

## (undated) DEVICE — CLOTH SURG PREP PREOPERATIVE CHLORHEXIDINE GLUC 2% READYPREP

## (undated) DEVICE — 3M™ STERI-DRAPE™ INCISE DRAPE 1050 (60CM X 45CM): Brand: STERI-DRAPE™

## (undated) DEVICE — PLUMEPORT LAPAROSCOPIC SMOKE FILTRATION DEVICE: Brand: PLUMEPORT ACTIV

## (undated) DEVICE — CODA, LP BALLOON CATHETER: Brand: CODA

## (undated) DEVICE — HYPODERMIC SAFETY NEEDLE: Brand: MAGELLAN

## (undated) DEVICE — DUP USE 223618 GUIDEWIRE EXTRA STIFF CRVD